# Patient Record
Sex: MALE | Race: WHITE | Employment: FULL TIME | URBAN - METROPOLITAN AREA
[De-identification: names, ages, dates, MRNs, and addresses within clinical notes are randomized per-mention and may not be internally consistent; named-entity substitution may affect disease eponyms.]

---

## 2017-12-26 ENCOUNTER — HOSPITAL ENCOUNTER (OUTPATIENT)
Dept: LAB | Age: 42
Discharge: HOME | End: 2017-12-26
Payer: OTHER GOVERNMENT

## 2017-12-26 ENCOUNTER — OFFICE VISIT (OUTPATIENT)
Dept: FAMILY MEDICINE CLINIC | Age: 42
End: 2017-12-26

## 2017-12-26 VITALS
WEIGHT: 303.8 LBS | TEMPERATURE: 97.8 F | HEIGHT: 74 IN | DIASTOLIC BLOOD PRESSURE: 87 MMHG | RESPIRATION RATE: 12 BRPM | BODY MASS INDEX: 38.99 KG/M2 | OXYGEN SATURATION: 98 % | SYSTOLIC BLOOD PRESSURE: 130 MMHG | HEART RATE: 80 BPM

## 2017-12-26 DIAGNOSIS — R10.11 RIGHT UPPER QUADRANT ABDOMINAL PAIN: ICD-10-CM

## 2017-12-26 DIAGNOSIS — E66.09 CLASS 2 OBESITY DUE TO EXCESS CALORIES WITHOUT SERIOUS COMORBIDITY WITH BODY MASS INDEX (BMI) OF 39.0 TO 39.9 IN ADULT: ICD-10-CM

## 2017-12-26 DIAGNOSIS — R10.11 RIGHT UPPER QUADRANT ABDOMINAL PAIN: Primary | ICD-10-CM

## 2017-12-26 PROBLEM — F33.9 RECURRENT DEPRESSION (HCC): Status: ACTIVE | Noted: 2017-12-26

## 2017-12-26 LAB
ALBUMIN SERPL-MCNC: 3.6 G/DL (ref 3.4–5)
ALBUMIN/GLOB SERPL: 1.2 {RATIO} (ref 0.8–1.7)
ALP SERPL-CCNC: 102 U/L (ref 45–117)
ALT SERPL-CCNC: 24 U/L (ref 16–61)
AMYLASE SERPL-CCNC: 26 U/L (ref 25–115)
ANION GAP SERPL CALC-SCNC: 9 MMOL/L (ref 3–18)
AST SERPL-CCNC: 15 U/L (ref 15–37)
BASOPHILS # BLD: 0.1 K/UL (ref 0–0.06)
BASOPHILS NFR BLD: 1 % (ref 0–2)
BILIRUB SERPL-MCNC: 0.3 MG/DL (ref 0.2–1)
BUN SERPL-MCNC: 13 MG/DL (ref 7–18)
BUN/CREAT SERPL: 12 (ref 12–20)
CALCIUM SERPL-MCNC: 8.5 MG/DL (ref 8.5–10.1)
CHLORIDE SERPL-SCNC: 104 MMOL/L (ref 100–108)
CO2 SERPL-SCNC: 26 MMOL/L (ref 21–32)
CREAT SERPL-MCNC: 1.11 MG/DL (ref 0.6–1.3)
DIFFERENTIAL METHOD BLD: ABNORMAL
EOSINOPHIL # BLD: 0.2 K/UL (ref 0–0.4)
EOSINOPHIL NFR BLD: 3 % (ref 0–5)
ERYTHROCYTE [DISTWIDTH] IN BLOOD BY AUTOMATED COUNT: 13.3 % (ref 11.6–14.5)
GLOBULIN SER CALC-MCNC: 3.1 G/DL (ref 2–4)
GLUCOSE SERPL-MCNC: 204 MG/DL (ref 74–99)
HCT VFR BLD AUTO: 49.6 % (ref 36–48)
HGB BLD-MCNC: 16.1 G/DL (ref 13–16)
LIPASE SERPL-CCNC: 109 U/L (ref 73–393)
LYMPHOCYTES # BLD: 2 K/UL (ref 0.9–3.6)
LYMPHOCYTES NFR BLD: 23 % (ref 21–52)
MCH RBC QN AUTO: 28.9 PG (ref 24–34)
MCHC RBC AUTO-ENTMCNC: 32.5 G/DL (ref 31–37)
MCV RBC AUTO: 89 FL (ref 74–97)
MONOCYTES # BLD: 0.6 K/UL (ref 0.05–1.2)
MONOCYTES NFR BLD: 7 % (ref 3–10)
NEUTS SEG # BLD: 5.6 K/UL (ref 1.8–8)
NEUTS SEG NFR BLD: 66 % (ref 40–73)
PLATELET # BLD AUTO: 181 K/UL (ref 135–420)
PMV BLD AUTO: 12.6 FL (ref 9.2–11.8)
POTASSIUM SERPL-SCNC: 4.4 MMOL/L (ref 3.5–5.5)
PROT SERPL-MCNC: 6.7 G/DL (ref 6.4–8.2)
RBC # BLD AUTO: 5.57 M/UL (ref 4.7–5.5)
SODIUM SERPL-SCNC: 139 MMOL/L (ref 136–145)
WBC # BLD AUTO: 8.5 K/UL (ref 4.6–13.2)

## 2017-12-26 PROCEDURE — 85025 COMPLETE CBC W/AUTO DIFF WBC: CPT | Performed by: FAMILY MEDICINE

## 2017-12-26 PROCEDURE — 83690 ASSAY OF LIPASE: CPT | Performed by: FAMILY MEDICINE

## 2017-12-26 PROCEDURE — 80053 COMPREHEN METABOLIC PANEL: CPT | Performed by: FAMILY MEDICINE

## 2017-12-26 PROCEDURE — 82150 ASSAY OF AMYLASE: CPT | Performed by: FAMILY MEDICINE

## 2017-12-26 PROCEDURE — 86677 HELICOBACTER PYLORI ANTIBODY: CPT | Performed by: FAMILY MEDICINE

## 2017-12-26 RX ORDER — HYDROCODONE BITARTRATE AND ACETAMINOPHEN 5; 325 MG/1; MG/1
TABLET ORAL
Refills: 0 | COMMUNITY
Start: 2017-12-21 | End: 2018-03-13 | Stop reason: ALTCHOICE

## 2017-12-26 RX ORDER — ONDANSETRON 4 MG/1
TABLET, ORALLY DISINTEGRATING ORAL
Refills: 0 | COMMUNITY
Start: 2017-12-21 | End: 2018-03-13 | Stop reason: ALTCHOICE

## 2017-12-26 NOTE — PROGRESS NOTES
Tiffanie Jaiem is a 38 y.o. male  presents for abdo pain. He went to ER in [place]. He was diagnosed with gall bladder disease. No Known Allergies  Outpatient Prescriptions Marked as Taking for the 12/26/17 encounter (Office Visit) with Martin Farley MD   Medication Sig Dispense Refill    HYDROcodone-acetaminophen (NORCO) 5-325 mg per tablet   0    ondansetron (ZOFRAN ODT) 4 mg disintegrating tablet   0    IBUPROFEN (MOTRIN PO) Take  by mouth.  diclofenac EC (VOLTAREN) 50 mg EC tablet TAKE 1 TABLET BY MOUTH 2 TIMES A DAY. 40 Tab 1    Cholecalciferol, Vitamin D3, 50,000 unit cap Take 50,000 Units by mouth every seven (7) days. Indications: VITAMIN D DEFICIENCY 8 Cap 0     Patient Active Problem List   Diagnosis Code    Heartburn R12    Chest pain, central R07.9    Neck pain M54.2    Seasonal allergic rhinitis J30.2    Smoking F17.200    Depression F32.9    Vitamin D deficiency E55.9    Obesity BMI 36.3 E66.9    Decreased libido R68.82     Past Medical History:   Diagnosis Date    Depression      Social History     Social History    Marital status:      Spouse name: N/A    Number of children: N/A    Years of education: N/A     Social History Main Topics    Smoking status: Current Every Day Smoker     Packs/day: 1.00     Years: 15.00     Types: Cigarettes    Smokeless tobacco: Former User    Alcohol use Yes      Comment: socail    Drug use: Not on file    Sexual activity: Not on file     Other Topics Concern    Not on file     Social History Narrative     No family history on file. Review of Systems   Constitutional: Negative for chills and fever. Cardiovascular: Negative for chest pain and palpitations. Gastrointestinal: Positive for abdominal pain, nausea and vomiting. Negative for constipation and diarrhea.      Vitals:    12/26/17 0903   BP: 130/87   Pulse: 80   Resp: 12   Temp: 97.8 °F (36.6 °C)   TempSrc: Oral   SpO2: 98%   Weight: 303 lb 12.8 oz (137.8 kg) Height: 6' 2\" (1.88 m)   PainSc:   4   PainLoc: Abdomen       Physical Exam   Constitutional: He is well-developed, well-nourished, and in no distress. Neck: Normal range of motion. Neck supple. Cardiovascular: Normal rate, regular rhythm and normal heart sounds. Pulmonary/Chest: Effort normal and breath sounds normal.   Musculoskeletal: Normal range of motion. Nursing note and vitals reviewed. Assessment/Plan      ICD-10-CM ICD-9-CM    1. Right upper quadrant abdominal pain R10.11 789.01 [facility]      REFERRAL TO GASTROENTEROLOGY      METABOLIC PANEL, COMPREHENSIVE      AMYLASE      LIPASE      H PYLORI AB, IGM      CBC WITH AUTOMATED DIFF     I have discussed the diagnosis with the patient and the intended plan of care as seen in the above orders. The patient has received an after-visit summary and questions were answered concerning future plans. I have discussed medication, side effects, and warnings with the patient in detail. The patient verbalized understanding and is in agreement with the plan of care. The patient will contact the office with any additional concerns. Follow-up Disposition:  Return in about 2 weeks (around 1/9/2018). lab results and schedule of future lab studies reviewed with patient      Discussed the patient's BMI with him. The BMI follow up plan is as follows:     dietary management education, guidance, and counseling  encourage exercise  monitor weight  prescribed dietary intake    An After Visit Summary was printed and given to the patient.     Sherry Vega MD

## 2017-12-26 NOTE — PATIENT INSTRUCTIONS
Abdominal Pain: Care Instructions  Your Care Instructions    Abdominal pain has many possible causes. Some aren't serious and get better on their own in a few days. Others need more testing and treatment. If your pain continues or gets worse, you need to be rechecked and may need more tests to find out what is wrong. You may need surgery to correct the problem. Don't ignore new symptoms, such as fever, nausea and vomiting, urination problems, pain that gets worse, and dizziness. These may be signs of a more serious problem. Your doctor may have recommended a follow-up visit in the next 8 to 12 hours. If you are not getting better, you may need more tests or treatment. The doctor has checked you carefully, but problems can develop later. If you notice any problems or new symptoms, get medical treatment right away. Follow-up care is a key part of your treatment and safety. Be sure to make and go to all appointments, and call your doctor if you are having problems. It's also a good idea to know your test results and keep a list of the medicines you take. How can you care for yourself at home? · Rest until you feel better. · To prevent dehydration, drink plenty of fluids, enough so that your urine is light yellow or clear like water. Choose water and other caffeine-free clear liquids until you feel better. If you have kidney, heart, or liver disease and have to limit fluids, talk with your doctor before you increase the amount of fluids you drink. · If your stomach is upset, eat mild foods, such as rice, dry toast or crackers, bananas, and applesauce. Try eating several small meals instead of two or three large ones. · Wait until 48 hours after all symptoms have gone away before you have spicy foods, alcohol, and drinks that contain caffeine. · Do not eat foods that are high in fat. · Avoid anti-inflammatory medicines such as aspirin, ibuprofen (Advil, Motrin), and naproxen (Aleve).  These can cause stomach upset. Talk to your doctor if you take daily aspirin for another health problem. When should you call for help? Call 911 anytime you think you may need emergency care. For example, call if:  ? · You passed out (lost consciousness). ? · You pass maroon or very bloody stools. ? · You vomit blood or what looks like coffee grounds. ? · You have new, severe belly pain. ?Call your doctor now or seek immediate medical care if:  ? · Your pain gets worse, especially if it becomes focused in one area of your belly. ? · You have a new or higher fever. ? · Your stools are black and look like tar, or they have streaks of blood. ? · You have unexpected vaginal bleeding. ? · You have symptoms of a urinary tract infection. These may include:  ¨ Pain when you urinate. ¨ Urinating more often than usual.  ¨ Blood in your urine. ? · You are dizzy or lightheaded, or you feel like you may faint. ? Watch closely for changes in your health, and be sure to contact your doctor if:  ? · You are not getting better after 1 day (24 hours). Where can you learn more? Go to http://delonte-antonio.info/. Enter B167 in the search box to learn more about \"Abdominal Pain: Care Instructions. \"  Current as of: March 20, 2017  Content Version: 11.4  © 1891-6737 [employer] [place]. Care instructions adapted under license by [employer] (which disclaims liability or warranty for this information). If you have questions about a medical condition or this instruction, always ask your healthcare professional. [place] [address] any warranty or liability for your use of this information. Body Mass Index: Care Instructions  Your Care Instructions    Body mass index (BMI) can help you see if your weight is raising your risk for health problems. It uses a formula to compare how much you weigh with how tall you are. · A BMI lower than 18.5 is considered underweight.   · A BMI between 18.5 and 24.9 is considered healthy. · A BMI between 25 and 29.9 is considered overweight. A BMI of 30 or higher is considered obese. If your BMI is in the normal range, it means that you have a lower risk for weight-related health problems. If your BMI is in the overweight or obese range, you may be at increased risk for weight-related health problems, such as high blood pressure, heart disease, stroke, arthritis or joint pain, and diabetes. If your BMI is in the underweight range, you may be at increased risk for health problems such as fatigue, lower protection (immunity) against illness, muscle loss, bone loss, hair loss, and hormone problems. BMI is just one measure of your risk for weight-related health problems. You may be at higher risk for health problems if you are not active, you eat an unhealthy diet, or you drink too much alcohol or use tobacco products. Follow-up care is a key part of your treatment and safety. Be sure to make and go to all appointments, and call your doctor if you are having problems. It's also a good idea to know your test results and keep a list of the medicines you take. How can you care for yourself at home? · Practice healthy eating habits. This includes eating plenty of fruits, vegetables, whole grains, lean protein, and low-fat dairy. · If your doctor recommends it, get more exercise. Walking is a good choice. Bit by bit, increase the amount you walk every day. Try for at least 30 minutes on most days of the week. · Do not smoke. Smoking can increase your risk for health problems. If you need help quitting, talk to your doctor about stop-smoking programs and medicines. These can increase your chances of quitting for good. · Limit alcohol to 2 drinks a day for men and 1 drink a day for women. Too much alcohol can cause health problems. If you have a BMI higher than 25  · Your doctor may do other tests to check your risk for weight-related health problems.  This may include measuring the distance around your waist. A waist measurement of more than 40 inches in men or 35 inches in women can increase the risk of weight-related health problems. · Talk with your doctor about steps you can take to stay healthy or improve your health. You may need to make lifestyle changes to lose weight and stay healthy, such as changing your diet and getting regular exercise. If you have a BMI lower than 18.5  · Your doctor may do other tests to check your risk for health problems. · Talk with your doctor about steps you can take to stay healthy or improve your health. You may need to make lifestyle changes to gain or maintain weight and stay healthy, such as getting more healthy foods in your diet and doing exercises to build muscle. Where can you learn more? Go to http://delonte-antonio.info/. Enter S176 in the search box to learn more about \"Body Mass Index: Care Instructions. \"  Current as of: October 13, 2016  Content Version: 11.4  © 2042-9564 [employer], [employer]. Care instructions adapted under license by [employer] (which disclaims liability or warranty for this information). If you have questions about a medical condition or this instruction, always ask your healthcare professional. [address] any warranty or liability for your use of this information.

## 2017-12-26 NOTE — MR AVS SNAPSHOT
Visit Information Date & Time Provider Department Dept. Phone Encounter #  
 12/26/2017  9:00 AM Martin Farley, [address] [facility] [id number] Follow-up Instructions Return in about 2 weeks (around 1/9/2018). Upcoming Health Maintenance Date Due Pneumococcal 19-64 Medium Risk (1 of 1 - PPSV23) 3/30/1992 Influenza Age 5 to Adult 8/1/2017 DTaP/Tdap/Td series (2 - Td) 12/8/2026 Allergies as of 12/26/2017  Review Complete On: 12/26/2017 By: Martin Farley MD  
 No Known Allergies Current Immunizations  Never Reviewed Name Date Tdap 12/8/2016 Not reviewed this visit You Were Diagnosed With   
  
 Codes Comments Right upper quadrant abdominal pain    -  Primary ICD-10-CM: R10.11 ICD-9-CM: 789.01 Vitals BP Pulse Temp Resp Height(growth percentile) Weight(growth percentile) 130/87 (BP 1 Location: Right arm, BP Patient Position: Sitting) 80 97.8 °F (36.6 °C) (Oral) 12 6' 2\" (1.88 m) 303 lb 12.8 oz (137.8 kg) SpO2 BMI Smoking Status 98% 39.01 kg/m2 Current Every Day Smoker BMI and BSA Data Body Mass Index Body Surface Area 39.01 kg/m 2 2.68 m 2 Preferred Pharmacy Pharmacy Name Phone [id number], [address] [place] [address] Your Updated Medication List  
  
   
This list is accurate as of: 12/26/17  9:19 AM.  Always use your most recent med list.  
  
  
  
  
 Cholecalciferol (Vitamin D3) 50,000 unit Cap Take 50,000 Units by mouth every seven (7) days. Indications: VITAMIN D DEFICIENCY  
  
 diclofenac EC 50 mg EC tablet Commonly known as:  VOLTAREN  
TAKE 1 TABLET BY MOUTH 2 TIMES A DAY. HYDROcodone-acetaminophen 5-325 mg per tablet Commonly known as:  Alisson President SUJATA PO Take  by mouth. ondansetron 4 mg disintegrating tablet Commonly known as:  ZOFRAN ODT We Performed the Following REFERRAL TO GASTROENTEROLOGY [QGA76 Custom] Comments:  
 Please evaluate patient for right upper quadrant pain. Follow-up Instructions Return in about 2 weeks (around 1/9/2018). To-Do List   
 12/26/2017 Lab:  AMYLASE   
  
 12/26/2017 Lab:  CBC WITH AUTOMATED DIFF   
  
 12/26/2017 Lab:  H PYLORI AB, IGM   
  
 12/26/2017 Lab:  LIPASE   
  
 12/26/2017 Lab:  METABOLIC PANEL, COMPREHENSIVE   
  
 12/26/2017 Imaging:  [facility] Referral Information Referral ID Referred By Referred To  
  
 [id number] Griselda Campos, 12[address] [facility] Wicho Lewis [facility] [place] [address] Drive [address] Henrik tafoya, [address] Phone: [phone] Fax: [phone] Visits Status Start Date End Date 1 New Request 12/26/17 12/26/18 If your referral has a status of pending review or denied, additional information will be sent to support the outcome of this decision. Patient Instructions Abdominal Pain: Care Instructions Your Care Instructions Abdominal pain has many possible causes. Some aren't serious and get better on their own in a few days. Others need more testing and treatment. If your pain continues or gets worse, you need to be rechecked and may need more tests to find out what is wrong. You may need surgery to correct the problem. Don't ignore new symptoms, such as fever, nausea and vomiting, urination problems, pain that gets worse, and dizziness. These may be signs of a more serious problem. Your doctor may have recommended a follow-up visit in the next 8 to 12 hours. If you are not getting better, you may need more tests or treatment. The doctor has checked you carefully, but problems can develop later. If you notice any problems or new symptoms, get medical treatment right away. Follow-up care is a key part of your treatment and safety.  Be sure to make and go to all appointments, and call your doctor if you are having problems. It's also a good idea to know your test results and keep a list of the medicines you take. How can you care for yourself at home? · Rest until you feel better. · To prevent dehydration, drink plenty of fluids, enough so that your urine is light yellow or clear like water. Choose water and other caffeine-free clear liquids until you feel better. If you have kidney, heart, or liver disease and have to limit fluids, talk with your doctor before you increase the amount of fluids you drink. · If your stomach is upset, eat mild foods, such as rice, dry toast or crackers, bananas, and applesauce. Try eating several small meals instead of two or three large ones. · Wait until 48 hours after all symptoms have gone away before you have spicy foods, alcohol, and drinks that contain caffeine. · Do not eat foods that are high in fat. · Avoid anti-inflammatory medicines such as aspirin, ibuprofen (Advil, Motrin), and naproxen (Aleve). These can cause stomach upset. Talk to your doctor if you take daily aspirin for another health problem. When should you call for help? Call 911 anytime you think you may need emergency care. For example, call if: 
? · You passed out (lost consciousness). ? · You pass maroon or very bloody stools. ? · You vomit blood or what looks like coffee grounds. ? · You have new, severe belly pain. ?Call your doctor now or seek immediate medical care if: 
? · Your pain gets worse, especially if it becomes focused in one area of your belly. ? · You have a new or higher fever. ? · Your stools are black and look like tar, or they have streaks of blood. ? · You have unexpected vaginal bleeding. ? · You have symptoms of a urinary tract infection. These may include: 
¨ Pain when you urinate. ¨ Urinating more often than usual. 
¨ Blood in your urine. ? · You are dizzy or lightheaded, or you feel like you may faint. ? Watch closely for changes in your health, and be sure to contact your doctor if: 
? · You are not getting better after 1 day (24 hours). Where can you learn more? Go to http://delonte-antonio.info/. Enter R674 in the search box to learn more about \"Abdominal Pain: Care Instructions. \" Current as of: March 20, 2017 Content Version: 11.4 © 5068-9615 [employer]. Care instructions adapted under license by [employer] (which disclaims liability or warranty for this information). If you have questions about a medical condition or this instruction, always ask your healthcare professional. [address] any warranty or liability for your use of this information. Introducing [facility]! Dear Cary Layton: 
Thank you for requesting a [email or web address] account. Our records indicate that you already have an active [email or web address] account. You can access your account anytime at [email or web address] [email or web address]. [employer]/So Protect Me Did you know that you can access your hospital and ER discharge instructions at any time in [email or web address]? You can also review all of your test results from your hospital stay or ER visit. Additional Information If you have questions, please visit the Frequently Asked Questions section of the [email or web address] website at [email or web address] [employer]/So Protect Me/. Remember, [email or web address] is NOT to be used for urgent needs. For medical emergencies, dial 911. Now available from your iPhone and Android! Please provide this summary of care documentation to your next provider. Your primary care clinician is listed as [id number] [facility]. If you have any questions after today's visit, please call [phone].

## 2017-12-26 NOTE — PROGRESS NOTES
Chief Complaint   Patient presents with    Abdominal Pain     1. Have you been to the ER, urgent care clinic since your last visit? Hospitalized since your last visit? Yes When: 12/20/17 Where: [facility] Reason for visit: vomitting, abdominal pain    2. Have you seen or consulted any other health care providers outside of the [address] [place] [address] since your last visit? Include any pap smears or colon screening.  No

## 2017-12-27 LAB — H PYLORI IGM SER-ACNC: <9 UNITS (ref 0–8.9)

## 2017-12-29 ENCOUNTER — HOSPITAL ENCOUNTER (OUTPATIENT)
Dept: ULTRASOUND IMAGING | Age: 42
Discharge: HOME | End: 2017-12-29
Attending: FAMILY MEDICINE
Payer: OTHER GOVERNMENT

## 2017-12-29 DIAGNOSIS — R10.11 RIGHT UPPER QUADRANT ABDOMINAL PAIN: ICD-10-CM

## 2017-12-29 PROCEDURE — 76705 ECHO EXAM OF ABDOMEN: CPT

## 2018-01-08 ENCOUNTER — OFFICE VISIT (OUTPATIENT)
Dept: FAMILY MEDICINE CLINIC | Age: 43
End: 2018-01-08

## 2018-01-08 VITALS
SYSTOLIC BLOOD PRESSURE: 140 MMHG | RESPIRATION RATE: 15 BRPM | OXYGEN SATURATION: 98 % | BODY MASS INDEX: 38.89 KG/M2 | HEART RATE: 71 BPM | TEMPERATURE: 98.2 F | HEIGHT: 74 IN | DIASTOLIC BLOOD PRESSURE: 70 MMHG | WEIGHT: 303 LBS

## 2018-01-08 DIAGNOSIS — E66.09 CLASS 2 OBESITY DUE TO EXCESS CALORIES WITHOUT SERIOUS COMORBIDITY WITH BODY MASS INDEX (BMI) OF 38.0 TO 38.9 IN ADULT: ICD-10-CM

## 2018-01-08 DIAGNOSIS — R73.9 ELEVATED BLOOD SUGAR: ICD-10-CM

## 2018-01-08 DIAGNOSIS — R10.9 ABDOMINAL PAIN, UNSPECIFIED ABDOMINAL LOCATION: Primary | ICD-10-CM

## 2018-01-08 RX ORDER — OMEPRAZOLE 20 MG/1
CAPSULE, DELAYED RELEASE ORAL
Qty: 30 CAP | Refills: 2 | Status: SHIPPED | OUTPATIENT
Start: 2018-01-08 | End: 2018-03-13 | Stop reason: SDUPTHER

## 2018-01-08 RX ORDER — METFORMIN HYDROCHLORIDE 500 MG/1
500 TABLET ORAL
Qty: 30 TAB | Refills: 1 | Status: SHIPPED | OUTPATIENT
Start: 2018-01-08 | End: 2018-05-29 | Stop reason: SDUPTHER

## 2018-01-08 NOTE — MR AVS SNAPSHOT
Visit Information Date & Time Provider Department Dept. Phone Encounter #  
 1/8/2018  4:30 PM Rosa Maria Lester MD [facility] [phone] [id number] Follow-up Instructions Return in about 1 month (around 2/8/2018). Upcoming Health Maintenance Date Due DTaP/Tdap/Td series (2 - Td) 12/8/2026 Allergies as of 1/8/2018  Review Complete On: 1/8/2018 By: Rosa Maria Lester MD  
 No Known Allergies Current Immunizations  Never Reviewed Name Date Tdap 12/8/2016 Not reviewed this visit You Were Diagnosed With   
  
 Codes Comments Abdominal pain, unspecified abdominal location    -  Primary ICD-10-CM: R10.9 ICD-9-CM: 789.00 Elevated blood sugar     ICD-10-CM: R73.9 ICD-9-CM: 790.29 Class 2 obesity due to excess calories without serious comorbidity with body mass index (BMI) of 38.0 to 38.9 in adult     ICD-10-CM: E66.09, Z68.38 
ICD-9-CM: 278.00, V85.38 Vitals BP Pulse Temp Resp Height(growth percentile) Weight(growth percentile) 140/70 71 98.2 °F (36.8 °C) 15 6' 2\" (1.88 m) 303 lb (137.4 kg) SpO2 BMI Smoking Status 98% 38.9 kg/m2 Current Every Day Smoker BMI and BSA Data Body Mass Index Body Surface Area 38.9 kg/m 2 2.68 m 2 Preferred Pharmacy Pharmacy Name Phone [facility] [address] [place] [address] Rd [phone] Your Updated Medication List  
  
   
This list is accurate as of: 1/8/18  5:04 PM.  Always use your most recent med list.  
  
  
  
  
 Cholecalciferol (Vitamin D3) 50,000 unit Cap Take 50,000 Units by mouth every seven (7) days. Indications: VITAMIN D DEFICIENCY  
  
 diclofenac EC 50 mg EC tablet Commonly known as:  VOLTAREN  
TAKE 1 TABLET BY MOUTH 2 TIMES A DAY. HYDROcodone-acetaminophen 5-325 mg per tablet Commonly known as:  NORCO  
  
 metFORMIN 500 mg tablet Commonly known as:  GLUCOPHAGE  
 Take 1 Tab by mouth daily (with breakfast). MOTRIN PO Take  by mouth. omeprazole 20 mg capsule Commonly known as:  PRILOSEC  
TAKE ONE CAPSULE BY MOUTH DAILY  DAYS  
  
 ondansetron 4 mg disintegrating tablet Commonly known as:  ZOFRAN ODT Prescriptions Sent to Pharmacy Refills  
 metFORMIN (GLUCOPHAGE) 500 mg tablet 1 Sig: Take 1 Tab by mouth daily (with breakfast). Class: Normal  
 Pharmacy: [address] [place] [address], [address] [place] [address] Ph #: [phone] Route: Oral  
 omeprazole (PRILOSEC) 20 mg capsule 2 Sig: TAKE ONE CAPSULE BY MOUTH DAILY  DAYS Class: Normal  
 Pharmacy: [address] [place] [address], [address] [place] [address] Ph #: [phone] We Performed the Following AMB POC HEMOGLOBIN A1C [79929 CPT(R)] Follow-up Instructions Return in about 1 month (around 2/8/2018). Patient Instructions Learning About Tests When You Have Diabetes Why do you need regular diabetes tests? Diabetes can be hard on your body if it's not well controlled. But having tests on a regular schedule can help you and your doctor find problems early, when it's easier to start managing them. What tests do you need? The tests you may have, how often you should have them, and the goals of the tests are: 
A1c blood test. This test shows the average level of blood sugar over the past 2 to 3 months. It helps your doctor see whether blood sugar levels have been staying within your target range. · How often: Every 3 to 6 months · Goal: A blood sugar level in your target range Blood pressure test: This test measures the pressure of blood flow in the arteries. Controlling blood pressure can help prevent damage to nerves and blood vessels. · How often: Every 3 to 6 months · Goal: A blood pressure level in your target range Cholesterol test: This test measures the amount of a type of fat in the blood. It is common for people with diabetes to also have high cholesterol. Too much cholesterol in the blood can build up inside the blood vessels and raise the risk for heart attack and stroke. · How often: At the time of your diabetes diagnosis, and as often as your doctor recommends after that · Goal: A cholesterol level in your target range Albumin-creatinine ratio test: This test checks for kidney damage by looking for the protein albumin (say \"al-BYOO-ramses\") in the urine. Albumin is normally found in the blood. Kidney damage can let small amounts of it (microalbumin) leak into the urine. · How often: Once a year · Goal: No protein in the urine Blood creatinine test/estimated glomerular filtration (eGFR): The blood creatinine (say \"smqj-FX-vi-neen\") level shows how well your kidneys are working. Creatinine is a waste product that muscles release into the blood. Blood creatinine is used to estimate the glomerular filtration rate. A high level of creatinine and/or a low eGFR may mean your kidneys are not working as well as they should. · How often: Once a year · Goal: Normal level of creatinine in the blood. The eGFR goal is greater than 60 mL/min/1.73 m². Complete foot exam: The doctor checks for foot sores and whether any sensation has been lost. 
· How often: Once a year · Goal: Healthy feet with no foot ulcers or loss of feeling Dental exam and cleaning: The dentist checks for gum disease and tooth decay. People with high blood sugar are more likely to have these problems. · How often: Every 6 months · Goal: Healthy teeth and gums Complete eye exam: High blood sugar levels can damage the eyes. This exam is done by an ophthalmologist or optometrist. It includes a dilated eye exam. The exam shows whether there's damage to the back of the eye (diabetic retinopathy). · How often: Once a year. If you don't have any signs of diabetic retinopathy, your doctor may recommend an exam every 2 years. · Goal: No damage to the back of the eye Thyroid-stimulating hormone (TSH) blood test: This test checks for thyroid disease. Too little thyroid hormone can cause some medicines (like insulin) to stay in the body longer. This can cause low blood sugar. You may be tested if you have high cholesterol or are a woman over 48years old. · How often: As part of your diabetes diagnosis, and as often as your doctor recommends after that · Goal: Normal level of TSH in the blood Follow-up care is a key part of your treatment and safety. Be sure to make and go to all appointments, and call your doctor if you are having problems. It's also a good idea to know your test results and keep a list of the medicines you take. Where can you learn more? Go to http://delonte-antonio.info/. Enter [id number] in the search box to learn more about \"Learning About Tests When You Have Diabetes. \" Current as of: March 13, 2017 Content Version: 11.4 © 7147-7013 [employer]. Care instructions adapted under license by [employer] (which disclaims liability or warranty for this information). If you have questions about a medical condition or this instruction, always ask your healthcare professional. [address] any warranty or liability for your use of this information. Introducing [facility]! Dear Skip Perla: 
Thank you for requesting a [employer] account. Our records indicate that you already have an active [employer] account. You can access your account anytime at [email or web address] Did you know that you can access your hospital and ER discharge instructions at any time in [employer]? You can also review all of your test results from your hospital stay or ER visit. Additional Information If you have questions, please visit the Frequently Asked Questions section of the [employer] website at [email or web address] Remember, [employer] is NOT to be used for urgent needs. For medical emergencies, dial 911. Now available from your iPhone and Android! Please provide this summary of care documentation to your next provider. Your primary care clinician is listed as [id number] [facility]. If you have any questions after today's visit, please call [phone].

## 2018-01-08 NOTE — PROGRESS NOTES
Chief Complaint   Patient presents with    Abdominal Pain     f/u    Nausea     still comes and goes      1. Have you been to the ER, urgent care clinic since your last visit? Hospitalized since your last visit? No    2. Have you seen or consulted any other health care providers outside of the [address] [place] [address] since your last visit? Include any pap smears or colon screening. No     Pt reports he vomited this morning.

## 2018-01-08 NOTE — PROGRESS NOTES
Jorge Luis Salazar is a 38 y.o. male  presents for follow up. He has no new complaints. Labs reviewed. No Known Allergies  Outpatient Prescriptions Marked as Taking for the 1/8/18 encounter (Office Visit) with Aung Solares MD   Medication Sig Dispense Refill    ondansetron (ZOFRAN ODT) 4 mg disintegrating tablet   0    IBUPROFEN (MOTRIN PO) Take  by mouth.  diclofenac EC (VOLTAREN) 50 mg EC tablet TAKE 1 TABLET BY MOUTH 2 TIMES A DAY. 40 Tab 1    Cholecalciferol, Vitamin D3, 50,000 unit cap Take 50,000 Units by mouth every seven (7) days. Indications: VITAMIN D DEFICIENCY 8 Cap 0     Patient Active Problem List   Diagnosis Code    Heartburn R12    Chest pain, central R07.9    Neck pain M54.2    Seasonal allergic rhinitis J30.2    Smoking F17.200    Depression F32.9    Vitamin D deficiency E55.9    Obesity E66.9    Decreased libido R68.82    Recurrent depression ([address] F33.9     Past Medical History:   Diagnosis Date    Depression      Social History     Social History    Marital status:      Spouse name: N/A    Number of children: N/A    Years of education: N/A     Social History Main Topics    Smoking status: Current Every Day Smoker     Packs/day: 1.00     Years: 15.00     Types: Cigarettes    Smokeless tobacco: Former User    Alcohol use Yes      Comment: socail    Drug use: None    Sexual activity: Not Asked     Other Topics Concern    None     Social History Narrative     No family history on file. Review of Systems   Constitutional: Negative for chills and fever. Cardiovascular: Negative for chest pain and palpitations. Gastrointestinal: Positive for abdominal pain. Negative for constipation, diarrhea, nausea and vomiting.      Vitals:    01/08/18 1628   BP: 140/70   Pulse: 71   Resp: 15   Temp: 98.2 °F (36.8 °C)   SpO2: 98%   Weight: 303 lb (137.4 kg)   Height: 6' 2\" (1.88 m)   PainSc:   0 - No pain       Physical Exam   Constitutional: He is oriented to person, place, and time and well-developed, well-nourished, and in no distress. Cardiovascular: Normal rate, regular rhythm and normal heart sounds. Pulmonary/Chest: Effort normal and breath sounds normal.   Neurological: He is alert and oriented to person, place, and time. Skin: Skin is warm and dry. Nursing note and vitals reviewed. Assessment/Plan      ICD-10-CM ICD-9-CM    1. Abdominal pain, unspecified abdominal location R10.9 789.00 AMB POC HEMOGLOBIN A1C      omeprazole (PRILOSEC) 20 mg capsule   2. Elevated blood sugar R73.9 790.29 metFORMIN (GLUCOPHAGE) 500 mg tablet   3. Class 2 obesity due to excess calories without serious comorbidity with body mass index (BMI) of 38.0 to 38.9 in adult E66.09 278.00     Z68.38 V85.38      I have discussed the diagnosis with the patient and the intended plan of care as seen in the above orders. The patient has received an after-visit summary and questions were answered concerning future plans. I have discussed medication, side effects, and warnings with the patient in detail. The patient verbalized understanding and is in agreement with the plan of care. The patient will contact the office with any additional concerns. Follow-up Disposition:  Return in about 1 month (around 2/8/2018).   lab results and schedule of future lab studies reviewed with patient      Margarito Navas MD

## 2018-01-08 NOTE — PATIENT INSTRUCTIONS
Learning About Tests When You Have Diabetes  Why do you need regular diabetes tests? Diabetes can be hard on your body if it's not well controlled. But having tests on a regular schedule can help you and your doctor find problems early, when it's easier to start managing them. What tests do you need? The tests you may have, how often you should have them, and the goals of the tests are:  A1c blood test. This test shows the average level of blood sugar over the past 2 to 3 months. It helps your doctor see whether blood sugar levels have been staying within your target range. · How often: Every 3 to 6 months  · Goal: A blood sugar level in your target range  Blood pressure test: This test measures the pressure of blood flow in the arteries. Controlling blood pressure can help prevent damage to nerves and blood vessels. · How often: Every 3 to 6 months  · Goal: A blood pressure level in your target range  Cholesterol test: This test measures the amount of a type of fat in the blood. It is common for people with diabetes to also have high cholesterol. Too much cholesterol in the blood can build up inside the blood vessels and raise the risk for heart attack and stroke. · How often: At the time of your diabetes diagnosis, and as often as your doctor recommends after that  · Goal: A cholesterol level in your target range  Albumin-creatinine ratio test: This test checks for kidney damage by looking for the protein albumin (say \"al-BYOO-ramses\") in the urine. Albumin is normally found in the blood. Kidney damage can let small amounts of it (microalbumin) leak into the urine. · How often: Once a year  · Goal: No protein in the urine  Blood creatinine test/estimated glomerular filtration (eGFR): The blood creatinine (say \"kfun-ST-st-neen\") level shows how well your kidneys are working. Creatinine is a waste product that muscles release into the blood.  Blood creatinine is used to estimate the glomerular filtration rate. A high level of creatinine and/or a low eGFR may mean your kidneys are not working as well as they should. · How often: Once a year  · Goal: Normal level of creatinine in the blood. The eGFR goal is greater than 60 mL/min/1.73 m². Complete foot exam: The doctor checks for foot sores and whether any sensation has been lost.  · How often: Once a year  · Goal: Healthy feet with no foot ulcers or loss of feeling  Dental exam and cleaning: The dentist checks for gum disease and tooth decay. People with high blood sugar are more likely to have these problems. · How often: Every 6 months  · Goal: Healthy teeth and gums  Complete eye exam: High blood sugar levels can damage the eyes. This exam is done by an ophthalmologist or optometrist. It includes a dilated eye exam. The exam shows whether there's damage to the back of the eye (diabetic retinopathy). · How often: Once a year. If you don't have any signs of diabetic retinopathy, your doctor may recommend an exam every 2 years. · Goal: No damage to the back of the eye  Thyroid-stimulating hormone (TSH) blood test: This test checks for thyroid disease. Too little thyroid hormone can cause some medicines (like insulin) to stay in the body longer. This can cause low blood sugar. You may be tested if you have high cholesterol or are a woman over 48years old. · How often: As part of your diabetes diagnosis, and as often as your doctor recommends after that  · Goal: Normal level of TSH in the blood  Follow-up care is a key part of your treatment and safety. Be sure to make and go to all appointments, and call your doctor if you are having problems. It's also a good idea to know your test results and keep a list of the medicines you take. Where can you learn more? Go to http://delonte-antonio.info/. Enter [id number] in the search box to learn more about \"Learning About Tests When You Have Diabetes. \"  Current as of: March 13, 2017  Content Version: 11.4  © 8814-8610 Healthwise[employer]. Care instructions adapted under license by [employer] (which disclaims liability or warranty for this information). If you have questions about a medical condition or this instruction, always ask your healthcare professional. [address] any warranty or liability for your use of this information.

## 2018-01-11 ENCOUNTER — OFFICE VISIT (OUTPATIENT)
Dept: FAMILY MEDICINE CLINIC | Age: 43
End: 2018-01-11

## 2018-01-11 ENCOUNTER — HOSPITAL ENCOUNTER (OUTPATIENT)
Dept: LAB | Age: 43
Discharge: HOME | End: 2018-01-11
Payer: OTHER GOVERNMENT

## 2018-01-11 VITALS
HEART RATE: 100 BPM | SYSTOLIC BLOOD PRESSURE: 166 MMHG | TEMPERATURE: 98.1 F | RESPIRATION RATE: 15 BRPM | DIASTOLIC BLOOD PRESSURE: 98 MMHG | OXYGEN SATURATION: 97 % | HEIGHT: 74 IN | BODY MASS INDEX: 38.89 KG/M2 | WEIGHT: 303 LBS

## 2018-01-11 DIAGNOSIS — I10 HYPERTENSION, UNSPECIFIED TYPE: ICD-10-CM

## 2018-01-11 DIAGNOSIS — R07.89 CHEST WALL PAIN: ICD-10-CM

## 2018-01-11 DIAGNOSIS — R07.89 CHEST WALL PAIN: Primary | ICD-10-CM

## 2018-01-11 PROCEDURE — 86787 VARICELLA-ZOSTER ANTIBODY: CPT | Performed by: FAMILY MEDICINE

## 2018-01-11 RX ORDER — GABAPENTIN 300 MG/1
300 CAPSULE ORAL 3 TIMES DAILY
Qty: 60 CAP | Refills: 0 | Status: SHIPPED | OUTPATIENT
Start: 2018-01-11 | End: 2018-03-13 | Stop reason: SDUPTHER

## 2018-01-11 NOTE — PROGRESS NOTES
Leonarda Stephenson is a 38 y.o. male  presents for pain in right mid back. It radiates to right side of chest.  He thinks that it is worse with eating. No nausea or vomiting. No fever or chills. No Known Allergies  Outpatient Prescriptions Marked as Taking for the 1/11/18 encounter (Office Visit) with Ramirez Llamas MD   Medication Sig Dispense Refill    gabapentin (NEURONTIN) 300 mg capsule Take 1 Cap by mouth three (3) times daily. Indications: NEUROPATHIC PAIN 60 Cap 0    metFORMIN (GLUCOPHAGE) 500 mg tablet Take 1 Tab by mouth daily (with breakfast). 30 Tab 1    omeprazole (PRILOSEC) 20 mg capsule TAKE ONE CAPSULE BY MOUTH DAILY  DAYS 30 Cap 2    HYDROcodone-acetaminophen (NORCO) 5-325 mg per tablet   0    ondansetron (ZOFRAN ODT) 4 mg disintegrating tablet   0    IBUPROFEN (MOTRIN PO) Take  by mouth.  diclofenac EC (VOLTAREN) 50 mg EC tablet TAKE 1 TABLET BY MOUTH 2 TIMES A DAY. 40 Tab 1    Cholecalciferol, Vitamin D3, 50,000 unit cap Take 50,000 Units by mouth every seven (7) days.  Indications: VITAMIN D DEFICIENCY 8 Cap 0     Patient Active Problem List   Diagnosis Code    Heartburn R12    Chest pain, central R07.9    Neck pain M54.2    Seasonal allergic rhinitis J30.2    Smoking F17.200    Depression F32.9    Vitamin D deficiency E55.9    Obesity E66.9    Decreased libido R68.82    Recurrent depression ([facility] [address] F33.9    Class 2 obesity due to excess calories without serious comorbidity with body mass index (BMI) of 38.0 to 38.9 in adult E66.09, Z68.38     Past Medical History:   Diagnosis Date    Depression      Social History     Social History    Marital status:      Spouse name: N/A    Number of children: N/A    Years of education: N/A     Social History Main Topics    Smoking status: Current Every Day Smoker     Packs/day: 1.00     Years: 15.00     Types: Cigarettes    Smokeless tobacco: Former User    Alcohol use Yes      Comment: socail    Drug use: None    Sexual activity: Not Asked     Other Topics Concern    None     Social History Narrative     No family history on file. Review of Systems   Constitutional: Negative for chills and fever. Respiratory: Negative for cough and shortness of breath. Cardiovascular: Negative for chest pain and palpitations. Gastrointestinal: Negative for abdominal pain, constipation, diarrhea, nausea and vomiting. Genitourinary: Negative for dysuria, flank pain and urgency. Musculoskeletal: Positive for back pain. Psychiatric/Behavioral: Negative. Vitals:    01/11/18 0925   BP: (!) 166/98   Pulse: 100   Resp: 15   Temp: 98.1 °F (36.7 °C)   SpO2: 97%   Weight: 303 lb (137.4 kg)   Height: 6' 2\" (1.88 m)   PainSc:   7       Physical Exam   Constitutional: He is oriented to person, place, and time and well-developed, well-nourished, and in no distress. Cardiovascular: Normal rate, regular rhythm and normal heart sounds. Pulmonary/Chest: Effort normal and breath sounds normal.   Abdominal: Soft. Bowel sounds are normal. He exhibits no distension. There is no tenderness. Musculoskeletal: Normal range of motion. He exhibits tenderness (mid back on right). He exhibits no edema. Neurological: He is alert and oriented to person, place, and time. Skin: Skin is warm and dry. Psychiatric: Memory, affect and judgment normal.   Nursing note and vitals reviewed. Assessment/Plan      ICD-10-CM ICD-9-CM    1. Chest wall pain R07.89 786.52 VZV AB, IGM      gabapentin (NEURONTIN) 300 mg capsule   2. Hypertension, unspecified type I10 401.9      Ultrasound reviewed and labs sent. He has already seen GI. Will follow up blood pressure check. I have discussed the diagnosis with the patient and the intended plan of care as seen in the above orders. The patient has received an after-visit summary and questions were answered concerning future plans.  I have discussed medication, side effects, and warnings with the patient in detail. The patient verbalized understanding and is in agreement with the plan of care. The patient will contact the office with any additional concerns.       Follow-up Disposition:  Return if symptoms worsen or fail to improve.  lab results and schedule of future lab studies reviewed with patient    Margarito Navas MD

## 2018-01-11 NOTE — MR AVS SNAPSHOT
Visit Information Date & Time Provider Department Dept. Phone Encounter #  
 1/11/2018  9:30 AM Bin Armijo, [address] [id number] Follow-up Instructions Return if symptoms worsen or fail to improve. Your Appointments 2/7/2018  5:30 PM  
ROUTINE CARE with Bin Armijo MD  
[facility]) Appt Note: 1 month f/u  
 [address] [facility] [address] [facility]  
[phone]  
  
   
 [address] Upcoming Health Maintenance Date Due DTaP/Tdap/Td series (2 - Td) 12/8/2026 Allergies as of 1/11/2018  Review Complete On: 1/11/2018 By: Bin Armijo MD  
 No Known Allergies Current Immunizations  Never Reviewed Name Date Tdap 12/8/2016 Not reviewed this visit You Were Diagnosed With   
  
 Codes Comments Chest wall pain    -  Primary ICD-10-CM: R07.89 ICD-9-CM: 786.52 Vitals BP Pulse Temp Resp Height(growth percentile) Weight(growth percentile) (!) 166/98 100 98.1 °F (36.7 °C) 15 6' 2\" (1.88 m) 303 lb (137.4 kg) SpO2 BMI Smoking Status 97% 38.9 kg/m2 Current Every Day Smoker Vitals History BMI and BSA Data Body Mass Index Body Surface Area 38.9 kg/m 2 2.68 m 2 Preferred Pharmacy Pharmacy Name Phone [id number] [address] [place] [address] [phone] Your Updated Medication List  
  
   
This list is accurate as of: 1/11/18  9:46 AM.  Always use your most recent med list.  
  
  
  
  
 Cholecalciferol (Vitamin D3) 50,000 unit Cap Take 50,000 Units by mouth every seven (7) days. Indications: VITAMIN D DEFICIENCY  
  
 diclofenac EC 50 mg EC tablet Commonly known as:  VOLTAREN  
TAKE 1 TABLET BY MOUTH 2 TIMES A DAY. gabapentin 300 mg capsule Commonly known as:  NEURONTIN  
 Take 1 Cap by mouth three (3) times daily. Indications: NEUROPATHIC PAIN  
  
 HYDROcodone-acetaminophen 5-325 mg per tablet Commonly known as:  NORCO  
  
 metFORMIN 500 mg tablet Commonly known as:  GLUCOPHAGE Take 1 Tab by mouth daily (with breakfast). MOTRIN PO Take  by mouth. omeprazole 20 mg capsule Commonly known as:  PRILOSEC  
TAKE ONE CAPSULE BY MOUTH DAILY  DAYS  
  
 ondansetron 4 mg disintegrating tablet Commonly known as:  ZOFRAN ODT Prescriptions Sent to Pharmacy Refills  
 gabapentin (NEURONTIN) 300 mg capsule 0 Sig: Take 1 Cap by mouth three (3) times daily. Indications: NEUROPATHIC PAIN Class: Normal  
 Pharmacy: [address] [place] [address], [address] [place] [address] #: [phone] Route: Oral  
  
Follow-up Instructions Return if symptoms worsen or fail to improve. To-Do List   
 01/11/2018 Lab:  [facility], [facility] Patient Instructions Shingles: Care Instructions Your Care Instructions Shingles (herpes zoster) causes pain and a blistered rash. The rash can appear anywhere on the body but will be on only one side of the body, the left or right. It will be in a band, a strip, or a small area. The pain can be very severe. Shingles can also cause tingling or itching in the area of the rash. The blisters scab over after a few days and heal in 2 to 4 weeks. Medicines can help you feel better and may help prevent more serious problems caused by shingles. Shingles is caused by the same virus that causes chickenpox. When you have chickenpox, the virus gets into your nerve roots and stays there (becomes dormant) long after you get over the chickenpox. If the virus becomes active again, it can cause shingles. Follow-up care is a key part of your treatment and safety.  Be sure to make and go to all appointments, and call your doctor if you are having problems. It's also a good idea to know your test results and keep a list of the medicines you take. How can you care for yourself at home? · Be safe with medicines. Take your medicines exactly as prescribed. Call your doctor if you think you are having a problem with your medicine. Antiviral medicine helps you get better faster. · Try not to scratch or pick at the blisters. They will crust over and fall off on their own if you leave them alone. · Put cool, wet cloths on the area to relieve pain and itching. You can also use calamine lotion. Try not to use so much lotion that it cakes and is hard to get off. · Put cornstarch or baking soda on the sores to help dry them out so they heal faster. · Do not use thick ointment, such as petroleum jelly, on the sores. This will keep them from drying and healing. · To help remove loose crusts, soak them in tap water. This can help decrease oozing, and dry and soothe the skin. · Take an over-the-counter pain medicine, such as acetaminophen (Tylenol), ibuprofen (Advil, Motrin), or naproxen (Aleve). Read and follow all instructions on the label. · Avoid close contact with people until the blisters have healed. It is very important for you to avoid contact with anyone who has never had chickenpox or the chickenpox vaccine. Pregnant women, young babies, and anyone else who has a hard time fighting infection (such as someone with HIV, diabetes, or cancer) is especially at risk. When should you call for help? Call your doctor now or seek immediate medical care if: 
? · You have a new or higher fever. ? · You have a severe headache and a stiff neck. ? · You lose the ability to think clearly. ? · The rash spreads to your forehead, nose, eyes, or eyelids. ? · You have eye pain, or your vision gets worse. ? · You have new pain in your face, or you cannot move the muscles in your face. ? · Blisters spread to new parts of your body. ?Watch closely for changes in your health, and be sure to contact your doctor if: 
? · The rash has not healed after 2 to 4 weeks. ? · You still have pain after the rash has healed. Where can you learn more? Go to http://delonte-atnonio.info/. Bertin Varela in the search box to learn more about \"Shingles: Care Instructions. \" Current as of: March 3, 2017 Content Version: 11.4 © 8495-5458 [employer]. Care instructions adapted under license by Leaf (which disclaims liability or warranty for this information). If you have questions about a medical condition or this instruction, always ask your healthcare professional. [address] any warranty or liability for your use of this information. Introducing [facility]! Dear Gwendolyn Ingram: 
Thank you for requesting a BeanJockey account. Our records indicate that you already have an active BeanJockey account. You can access your account anytime at [email or web address] Did you know that you can access your hospital and ER discharge instructions at any time in BeanJockey? You can also review all of your test results from your hospital stay or ER visit. Additional Information If you have questions, please visit the Frequently Asked Questions section of the BeanJockey website at [email or web address] Remember, BeanJockey is NOT to be used for urgent needs. For medical emergencies, dial 911. Now available from your iPhone and Android! Please provide this summary of care documentation to your next provider. Your primary care clinician is listed as [id number] [facility]. If you have any questions after today's visit, please call [phone].

## 2018-01-11 NOTE — PATIENT INSTRUCTIONS
Shingles: Care Instructions  Your Care Instructions    Shingles (herpes zoster) causes pain and a blistered rash. The rash can appear anywhere on the body but will be on only one side of the body, the left or right. It will be in a band, a strip, or a small area. The pain can be very severe. Shingles can also cause tingling or itching in the area of the rash. The blisters scab over after a few days and heal in 2 to 4 weeks. Medicines can help you feel better and may help prevent more serious problems caused by shingles. Shingles is caused by the same virus that causes chickenpox. When you have chickenpox, the virus gets into your nerve roots and stays there (becomes dormant) long after you get over the chickenpox. If the virus becomes active again, it can cause shingles. Follow-up care is a key part of your treatment and safety. Be sure to make and go to all appointments, and call your doctor if you are having problems. It's also a good idea to know your test results and keep a list of the medicines you take. How can you care for yourself at home? · Be safe with medicines. Take your medicines exactly as prescribed. Call your doctor if you think you are having a problem with your medicine. Antiviral medicine helps you get better faster. · Try not to scratch or pick at the blisters. They will crust over and fall off on their own if you leave them alone. · Put cool, wet cloths on the area to relieve pain and itching. You can also use calamine lotion. Try not to use so much lotion that it cakes and is hard to get off. · Put cornstarch or baking soda on the sores to help dry them out so they heal faster. · Do not use thick ointment, such as petroleum jelly, on the sores. This will keep them from drying and healing. · To help remove loose crusts, soak them in tap water. This can help decrease oozing, and dry and soothe the skin.   · Take an over-the-counter pain medicine, such as acetaminophen (Tylenol), ibuprofen (Advil, Motrin), or naproxen (Aleve). Read and follow all instructions on the label. · Avoid close contact with people until the blisters have healed. It is very important for you to avoid contact with anyone who has never had chickenpox or the chickenpox vaccine. Pregnant women, young babies, and anyone else who has a hard time fighting infection (such as someone with HIV, diabetes, or cancer) is especially at risk. When should you call for help? Call your doctor now or seek immediate medical care if:  ? · You have a new or higher fever. ? · You have a severe headache and a stiff neck. ? · You lose the ability to think clearly. ? · The rash spreads to your forehead, nose, eyes, or eyelids. ? · You have eye pain, or your vision gets worse. ? · You have new pain in your face, or you cannot move the muscles in your face. ? · Blisters spread to new parts of your body. ? Watch closely for changes in your health, and be sure to contact your doctor if:  ? · The rash has not healed after 2 to 4 weeks. ? · You still have pain after the rash has healed. Where can you learn more? Go to http://delonte-antonio.info/. Emre Hoover in the search box to learn more about \"Shingles: Care Instructions. \"  Current as of: March 3, 2017  Content Version: 11.4  © 8416-2119 [employer]. Care instructions adapted under license by [employer] (which disclaims liability or warranty for this information). If you have questions about a medical condition or this instruction, always ask your healthcare professional. [address] any warranty or liability for your use of this information.

## 2018-01-11 NOTE — PROGRESS NOTES
Pt reports that the pain under the right shoulder is hurting again. It has been hurting for about 45 minutes.

## 2018-01-12 LAB — VZV IGM SER IA-ACNC: <0.91 INDEX (ref 0–0.9)

## 2018-03-13 ENCOUNTER — OFFICE VISIT (OUTPATIENT)
Dept: FAMILY MEDICINE CLINIC | Age: 43
End: 2018-03-13

## 2018-03-13 VITALS
SYSTOLIC BLOOD PRESSURE: 126 MMHG | HEIGHT: 74 IN | DIASTOLIC BLOOD PRESSURE: 60 MMHG | BODY MASS INDEX: 38.24 KG/M2 | HEART RATE: 66 BPM | RESPIRATION RATE: 12 BRPM | OXYGEN SATURATION: 94 % | TEMPERATURE: 98.2 F | WEIGHT: 298 LBS

## 2018-03-13 DIAGNOSIS — R10.9 ABDOMINAL PAIN, UNSPECIFIED ABDOMINAL LOCATION: ICD-10-CM

## 2018-03-13 DIAGNOSIS — R07.89 CHEST WALL PAIN: Primary | ICD-10-CM

## 2018-03-13 RX ORDER — DICLOFENAC SODIUM 50 MG/1
TABLET, DELAYED RELEASE ORAL
Qty: 60 TAB | Refills: 2 | Status: SHIPPED | OUTPATIENT
Start: 2018-03-13 | End: 2018-05-29 | Stop reason: ALTCHOICE

## 2018-03-13 RX ORDER — GABAPENTIN 300 MG/1
300 CAPSULE ORAL 2 TIMES DAILY
Qty: 60 CAP | Refills: 2 | Status: SHIPPED | OUTPATIENT
Start: 2018-03-13 | End: 2018-05-29 | Stop reason: SDUPTHER

## 2018-03-13 RX ORDER — OMEPRAZOLE 20 MG/1
CAPSULE, DELAYED RELEASE ORAL
Qty: 30 CAP | Refills: 2 | Status: SHIPPED | OUTPATIENT
Start: 2018-03-13 | End: 2018-08-13 | Stop reason: SDUPTHER

## 2018-03-13 NOTE — PROGRESS NOTES
Patient states he is here for f/u on his DM and Shingles. He sates he has not had any recent pain from shingles, he would like to discuss test results that were done at [facility]. Patient is also asking for refills. 1. Have you been to the ER, urgent care clinic since your last visit? Hospitalized since your last visit? No    2. Have you seen or consulted any other health care providers outside of the [address] [place] [address] since your last visit? Include any pap smears or colon screening.  No

## 2018-03-13 NOTE — PROGRESS NOTES
Milan Morgan is a 38 y.o. male  presents for follow up. He has abdo pain only intermittent. No fever     No Known Allergies  Outpatient Prescriptions Marked as Taking for the 3/13/18 encounter (Office Visit) with Pepe Roberto MD   Medication Sig Dispense Refill    gabapentin (NEURONTIN) 300 mg capsule Take 1 Cap by mouth three (3) times daily. Indications: NEUROPATHIC PAIN 60 Cap 0    metFORMIN (GLUCOPHAGE) 500 mg tablet Take 1 Tab by mouth daily (with breakfast). 30 Tab 1    omeprazole (PRILOSEC) 20 mg capsule TAKE ONE CAPSULE BY MOUTH DAILY  DAYS 30 Cap 2    diclofenac EC (VOLTAREN) 50 mg EC tablet TAKE 1 TABLET BY MOUTH 2 TIMES A DAY. 40 Tab 1    Cholecalciferol, Vitamin D3, 50,000 unit cap Take 50,000 Units by mouth every seven (7) days. Indications: VITAMIN D DEFICIENCY 8 Cap 0     Patient Active Problem List   Diagnosis Code    Heartburn R12    Chest pain, central R07.9    Neck pain M54.2    Seasonal allergic rhinitis J30.2    Smoking F17.200    Depression F32.9    Vitamin D deficiency E55.9    Obesity E66.9    Decreased libido R68.82    Recurrent depression ([facility] [address] F33.9    Class 2 obesity due to excess calories without serious comorbidity with body mass index (BMI) of 38.0 to 38.9 in adult E66.09, Z68.38    Hypertension I10     Past Medical History:   Diagnosis Date    Depression     Hypertension 1/11/2018     Social History     Social History    Marital status:      Spouse name: N/A    Number of children: N/A    Years of education: N/A     Social History Main Topics    Smoking status: Current Every Day Smoker     Packs/day: 1.00     Years: 15.00     Types: Cigarettes    Smokeless tobacco: Former User    Alcohol use Yes      Comment: socail    Drug use: None    Sexual activity: Not Asked     Other Topics Concern    None     Social History Narrative     History reviewed. No pertinent family history.      Review of Systems   Constitutional: Negative for chills and fever.   Cardiovascular: Negative for chest pain and palpitations. Gastrointestinal: Negative for constipation, diarrhea, nausea and vomiting. Skin: Positive for itching. Negative for rash. Neurological: Negative. Psychiatric/Behavioral: Negative. Vitals:    03/13/18 1446   BP: 126/60   Pulse: 66   Resp: 12   Temp: 98.2 °F (36.8 °C)   TempSrc: Oral   SpO2: 94%   Weight: 298 lb (135.2 kg)   Height: 6' 2\" (1.88 m)   PainSc:   0 - No pain       Physical Exam   Constitutional: He is oriented to person, place, and time and well-developed, well-nourished, and in no distress. Neck: Normal range of motion. Neck supple. Cardiovascular: Normal rate, regular rhythm and normal heart sounds. Pulmonary/Chest: Effort normal and breath sounds normal.   Neurological: He is alert and oriented to person, place, and time. Gait normal.   Skin: Skin is warm and dry. Nursing note and vitals reviewed. Assessment/Plan      ICD-10-CM ICD-9-CM    1. Chest wall pain R07.89 786.52 gabapentin (NEURONTIN) 300 mg capsule   2. Abdominal pain, unspecified abdominal location R10.9 789.00 omeprazole (PRILOSEC) 20 mg capsule       I have discussed the diagnosis with the patient and the intended plan of care as seen in the above orders. The patient has received an after-visit summary and questions were answered concerning future plans. I have discussed medication, side effects, and warnings with the patient in detail. The patient verbalized understanding and is in agreement with the plan of care. The patient will contact the office with any additional concerns.       Follow-up Disposition:  Return in about 6 months (around 9/13/2018), or if symptoms worsen or fail to improve.  lab results and schedule of future lab studies reviewed with patient    Gaston Santo MD

## 2018-03-13 NOTE — PATIENT INSTRUCTIONS
Abdominal Pain: Care Instructions  Your Care Instructions    Abdominal pain has many possible causes. Some aren't serious and get better on their own in a few days. Others need more testing and treatment. If your pain continues or gets worse, you need to be rechecked and may need more tests to find out what is wrong. You may need surgery to correct the problem. Don't ignore new symptoms, such as fever, nausea and vomiting, urination problems, pain that gets worse, and dizziness. These may be signs of a more serious problem. Your doctor may have recommended a follow-up visit in the next 8 to 12 hours. If you are not getting better, you may need more tests or treatment. The doctor has checked you carefully, but problems can develop later. If you notice any problems or new symptoms, get medical treatment right away. Follow-up care is a key part of your treatment and safety. Be sure to make and go to all appointments, and call your doctor if you are having problems. It's also a good idea to know your test results and keep a list of the medicines you take. How can you care for yourself at home? · Rest until you feel better. · To prevent dehydration, drink plenty of fluids, enough so that your urine is light yellow or clear like water. Choose water and other caffeine-free clear liquids until you feel better. If you have kidney, heart, or liver disease and have to limit fluids, talk with your doctor before you increase the amount of fluids you drink. · If your stomach is upset, eat mild foods, such as rice, dry toast or crackers, bananas, and applesauce. Try eating several small meals instead of two or three large ones. · Wait until 48 hours after all symptoms have gone away before you have spicy foods, alcohol, and drinks that contain caffeine. · Do not eat foods that are high in fat. · Avoid anti-inflammatory medicines such as aspirin, ibuprofen (Advil, Motrin), and naproxen (Aleve).  These can cause stomach upset. Talk to your doctor if you take daily aspirin for another health problem. When should you call for help? Call 911 anytime you think you may need emergency care. For example, call if:  ? · You passed out (lost consciousness). ? · You pass maroon or very bloody stools. ? · You vomit blood or what looks like coffee grounds. ? · You have new, severe belly pain. ?Call your doctor now or seek immediate medical care if:  ? · Your pain gets worse, especially if it becomes focused in one area of your belly. ? · You have a new or higher fever. ? · Your stools are black and look like tar, or they have streaks of blood. ? · You have unexpected vaginal bleeding. ? · You have symptoms of a urinary tract infection. These may include:  ¨ Pain when you urinate. ¨ Urinating more often than usual.  ¨ Blood in your urine. ? · You are dizzy or lightheaded, or you feel like you may faint. ? Watch closely for changes in your health, and be sure to contact your doctor if:  ? · You are not getting better after 1 day (24 hours). Where can you learn more? Go to http://delonte-antonio.info/. Enter Y323 in the search box to learn more about \"Abdominal Pain: Care Instructions. \"  Current as of: March 20, 2017  Content Version: 11.4  © 4901-1957 [employer]. Care instructions adapted under license by [employer] (which disclaims liability or warranty for this information). If you have questions about a medical condition or this instruction, always ask your healthcare professional. [place] [address] any warranty or liability for your use of this information.

## 2018-05-29 ENCOUNTER — OFFICE VISIT (OUTPATIENT)
Dept: FAMILY MEDICINE CLINIC | Age: 43
End: 2018-05-29

## 2018-05-29 ENCOUNTER — HOSPITAL ENCOUNTER (OUTPATIENT)
Dept: LAB | Age: 43
Discharge: HOME | End: 2018-05-29
Payer: OTHER GOVERNMENT

## 2018-05-29 VITALS
RESPIRATION RATE: 15 BRPM | HEIGHT: 74 IN | BODY MASS INDEX: 38.12 KG/M2 | SYSTOLIC BLOOD PRESSURE: 144 MMHG | HEART RATE: 79 BPM | DIASTOLIC BLOOD PRESSURE: 88 MMHG | OXYGEN SATURATION: 98 % | WEIGHT: 297 LBS

## 2018-05-29 DIAGNOSIS — E11.65 TYPE 2 DIABETES MELLITUS WITH HYPERGLYCEMIA, WITHOUT LONG-TERM CURRENT USE OF INSULIN (HCC): ICD-10-CM

## 2018-05-29 DIAGNOSIS — M25.511 ACUTE PAIN OF RIGHT SHOULDER: Primary | ICD-10-CM

## 2018-05-29 DIAGNOSIS — E55.9 VITAMIN D DEFICIENCY: ICD-10-CM

## 2018-05-29 DIAGNOSIS — R73.9 ELEVATED BLOOD SUGAR: ICD-10-CM

## 2018-05-29 DIAGNOSIS — R07.89 CHEST WALL PAIN: ICD-10-CM

## 2018-05-29 PROBLEM — E66.01 SEVERE OBESITY (BMI 35.0-39.9) WITH COMORBIDITY (HCC): Status: ACTIVE | Noted: 2018-05-29

## 2018-05-29 LAB
ALBUMIN SERPL-MCNC: 4.1 G/DL (ref 3.4–5)
ALBUMIN/GLOB SERPL: 1.3 {RATIO} (ref 0.8–1.7)
ALP SERPL-CCNC: 110 U/L (ref 45–117)
ALT SERPL-CCNC: 26 U/L (ref 16–61)
ANION GAP SERPL CALC-SCNC: 10 MMOL/L (ref 3–18)
AST SERPL-CCNC: 19 U/L (ref 15–37)
BILIRUB SERPL-MCNC: 0.3 MG/DL (ref 0.2–1)
BUN SERPL-MCNC: 14 MG/DL (ref 7–18)
BUN/CREAT SERPL: 15 (ref 12–20)
CALCIUM SERPL-MCNC: 9 MG/DL (ref 8.5–10.1)
CHLORIDE SERPL-SCNC: 107 MMOL/L (ref 100–108)
CO2 SERPL-SCNC: 23 MMOL/L (ref 21–32)
CREAT SERPL-MCNC: 0.95 MG/DL (ref 0.6–1.3)
EST. AVERAGE GLUCOSE BLD GHB EST-MCNC: 123 MG/DL
GLOBULIN SER CALC-MCNC: 3.1 G/DL (ref 2–4)
GLUCOSE SERPL-MCNC: 85 MG/DL (ref 74–99)
HBA1C MFR BLD: 5.9 % (ref 4.2–5.6)
POTASSIUM SERPL-SCNC: 4.3 MMOL/L (ref 3.5–5.5)
PROT SERPL-MCNC: 7.2 G/DL (ref 6.4–8.2)
SODIUM SERPL-SCNC: 140 MMOL/L (ref 136–145)

## 2018-05-29 PROCEDURE — 83036 HEMOGLOBIN GLYCOSYLATED A1C: CPT | Performed by: FAMILY MEDICINE

## 2018-05-29 PROCEDURE — 82306 VITAMIN D 25 HYDROXY: CPT | Performed by: FAMILY MEDICINE

## 2018-05-29 PROCEDURE — 80053 COMPREHEN METABOLIC PANEL: CPT | Performed by: FAMILY MEDICINE

## 2018-05-29 RX ORDER — GABAPENTIN 300 MG/1
300 CAPSULE ORAL 2 TIMES DAILY
Qty: 60 CAP | Refills: 2 | Status: SHIPPED | OUTPATIENT
Start: 2018-05-29 | End: 2020-08-05

## 2018-05-29 RX ORDER — KETOROLAC TROMETHAMINE 10 MG/1
10 TABLET, FILM COATED ORAL
Qty: 24 TAB | Refills: 0 | Status: SHIPPED | OUTPATIENT
Start: 2018-05-29 | End: 2018-07-16

## 2018-05-29 RX ORDER — LANCETS
EACH MISCELLANEOUS
Qty: 1 EACH | Refills: 11 | Status: SHIPPED | OUTPATIENT
Start: 2018-05-29

## 2018-05-29 RX ORDER — INSULIN PUMP SYRINGE, 3 ML
EACH MISCELLANEOUS
Qty: 1 KIT | Refills: 0 | Status: SHIPPED | OUTPATIENT
Start: 2018-05-29

## 2018-05-29 RX ORDER — METFORMIN HYDROCHLORIDE 500 MG/1
500 TABLET ORAL
Qty: 30 TAB | Refills: 1 | Status: SHIPPED | OUTPATIENT
Start: 2018-05-29 | End: 2020-04-01 | Stop reason: SDUPTHER

## 2018-05-29 NOTE — PROGRESS NOTES
Meg Jaime is a 37 y.o. male  presents for right shoulder pain. He heard a pop. He has assoc tingling. Sxs started yesterday. sxs have gotten worse. Has tried otc meds but it has not helped. No Known Allergies  Outpatient Prescriptions Marked as Taking for the 5/29/18 encounter (Office Visit) with Samantha Velasquez MD   Medication Sig Dispense Refill    gabapentin (NEURONTIN) 300 mg capsule Take 1 Cap by mouth two (2) times a day. Indications: NEUROPATHIC PAIN 60 Cap 2    omeprazole (PRILOSEC) 20 mg capsule TAKE ONE CAPSULE BY MOUTH DAILY  DAYS 30 Cap 2    metFORMIN (GLUCOPHAGE) 500 mg tablet Take 1 Tab by mouth daily (with breakfast). 30 Tab 1    Cholecalciferol, Vitamin D3, 50,000 unit cap Take 50,000 Units by mouth every seven (7) days. Indications: VITAMIN D DEFICIENCY 8 Cap 0     Patient Active Problem List   Diagnosis Code    Heartburn R12    Chest pain, central R07.9    Neck pain M54.2    Seasonal allergic rhinitis J30.2    Smoking F17.200    Depression F32.9    Vitamin D deficiency E55.9    Obesity E66.9    Decreased libido R68.82    Recurrent depression ([address] F33.9    Class 2 obesity due to excess calories without serious comorbidity with body mass index (BMI) of 38.0 to 38.9 in adult E66.09, Z68.38    Hypertension I10     Past Medical History:   Diagnosis Date    Depression     Hypertension 1/11/2018     Social History     Social History    Marital status:      Spouse name: N/A    Number of children: N/A    Years of education: N/A     Social History Main Topics    Smoking status: Current Every Day Smoker     Packs/day: 1.00     Years: 15.00     Types: Cigarettes    Smokeless tobacco: Former User    Alcohol use Yes      Comment: socail    Drug use: None    Sexual activity: Not Asked     Other Topics Concern    None     Social History Narrative     No family history on file. Review of Systems   Constitutional: Negative for chills and fever. Cardiovascular: Negative for chest pain and palpitations. Gastrointestinal: Negative for nausea and vomiting. Musculoskeletal: Positive for joint pain (right shoulder. ). Vitals:    05/29/18 1441   BP: 144/88   Pulse: 79   Resp: 15   SpO2: 98%   Weight: 297 lb (134.7 kg)   Height: 6' 2\" (1.88 m)   PainSc:   5   PainLoc: Shoulder       Physical Exam   Constitutional: He is oriented to person, place, and time and well-developed, well-nourished, and in no distress. Cardiovascular: Normal rate, regular rhythm and normal heart sounds. Pulmonary/Chest: Effort normal and breath sounds normal.   Musculoskeletal: Normal range of motion. He exhibits tenderness. He exhibits no edema or deformity. Neurological: He is alert and oriented to person, place, and time. Skin: Skin is warm and dry. Nursing note and vitals reviewed. Assessment/Plan      ICD-10-CM ICD-9-CM    1. Acute pain of right shoulder M25.511 719.41 MRI SHOULDER RT WO CONT      ketorolac (TORADOL) 10 mg tablet   2. Elevated blood sugar R73.9 790.29 metFORMIN (GLUCOPHAGE) 500 mg tablet   3. Chest wall pain R07.89 786.52 gabapentin (NEURONTIN) 300 mg capsule   4. Type 2 diabetes mellitus with hyperglycemia, without long-term current use of insulin ([facility]) G33.83 715.34 METABOLIC PANEL, COMPREHENSIVE     790.29 HEMOGLOBIN A1C WITH EAG      Blood-Glucose Meter monitoring kit      Lancets misc      glucose blood VI test strips (BLOOD GLUCOSE TEST) strip   5. Vitamin D deficiency E55.9 268.9 VITAMIN D, 25 HYDROXY     I have discussed the diagnosis with the patient and the intended plan of care as seen in the above orders. The patient has received an after-visit summary and questions were answered concerning future plans. I have discussed medication, side effects, and warnings with the patient in detail. The patient verbalized understanding and is in agreement with the plan of care.  The patient will contact the office with any additional concerns. Follow-up Disposition:  Return in about 1 month (around 6/29/2018).   lab results and schedule of future lab studies reviewed with patient    Rene Coyne MD

## 2018-05-29 NOTE — MR AVS SNAPSHOT
Aleyda Aquino [facility] [address] [place] [address] Road 
[phone] Patient: Shanna Bullock MRN: [id number] Visit Information Date & Time Provider Department Dept. Phone Encounter #  
 5/29/2018  2:30 PM Nancie Corona MD [facility] [phone] [id number] Follow-up Instructions Return in about 1 month (around 6/29/2018). Your Appointments 9/19/2018  5:30 PM  
FOLLOW UP EXAM with Nancie Corona MD  
[facility] [address]) Appt Note: 6 month f/u  
 [address] [facility] [address] [place] [address] Road  
[phone]  
  
   
 [facility] [address] [place] [address] Upcoming Health Maintenance Date Due Influenza Age 5 to Adult 8/1/2018 DTaP/Tdap/Td series (2 - Td) 12/8/2026 Allergies as of 5/29/2018  Review Complete On: 5/29/2018 By: Nancie Corona MD  
 No Known Allergies Current Immunizations  Never Reviewed Name Date Tdap 12/8/2016 Not reviewed this visit You Were Diagnosed With   
  
 Codes Comments Acute pain of right shoulder    -  Primary ICD-10-CM: M25.511 ICD-9-CM: 719.41 Elevated blood sugar     ICD-10-CM: R73.9 ICD-9-CM: 790.29 Chest wall pain     ICD-10-CM: R07.89 ICD-9-CM: 786.52 Type 2 diabetes mellitus with hyperglycemia, without long-term current use of insulin (HCC)     ICD-10-CM: E11.65 ICD-9-CM: 250.00, 790.29 Vitamin D deficiency     ICD-10-CM: E55.9 ICD-9-CM: 268.9 Vitals BP Pulse Resp Height(growth percentile) Weight(growth percentile) SpO2  
 144/88 79 15 6' 2\" (1.88 m) 297 lb (134.7 kg) 98% BMI Smoking Status 38.13 kg/m2 Current Every Day Smoker Vitals History BMI and BSA Data Body Mass Index Body Surface Area  
 38.13 kg/m 2 2.65 m 2 Preferred Pharmacy Pharmacy Name Phone [facility] [place] [address], [address] [phone] Your Updated Medication List  
  
   
This list is accurate as of 5/29/18  3:04 PM.  Always use your most recent med list.  
  
  
  
  
 cholecalciferol 50,000 unit capsule Commonly known as:  VITAMIN D3 Take 50,000 Units by mouth every seven (7) days. Indications: VITAMIN D DEFICIENCY  
  
 gabapentin 300 mg capsule Commonly known as:  NEURONTIN Take 1 Cap by mouth two (2) times a day. Indications: NEUROPATHIC PAIN  
  
 ketorolac 10 mg tablet Commonly known as:  TORADOL Take 1 Tab by mouth every six (6) hours as needed for Pain.  
  
 metFORMIN 500 mg tablet Commonly known as:  GLUCOPHAGE Take 1 Tab by mouth daily (with breakfast). omeprazole 20 mg capsule Commonly known as:  PRILOSEC  
TAKE ONE CAPSULE BY MOUTH DAILY  DAYS Prescriptions Sent to Pharmacy Refills  
 ketorolac (TORADOL) 10 mg tablet 0 Sig: Take 1 Tab by mouth every six (6) hours as needed for Pain. Class: Normal  
 Pharmacy: [address] [place] [address], [address] [place] [address] Ph #: [phone] Route: Oral  
 metFORMIN (GLUCOPHAGE) 500 mg tablet 1 Sig: Take 1 Tab by mouth daily (with breakfast). Class: Normal  
 Pharmacy: [address] [place] [address], [address] [place] [address] Ph #: [phone] Route: Oral  
 gabapentin (NEURONTIN) 300 mg capsule 2 Sig: Take 1 Cap by mouth two (2) times a day. Indications: NEUROPATHIC PAIN Class: Normal  
 Pharmacy: [address] [place] [address], [address] [place] [address] Ph #: [phone] Route: Oral  
  
Follow-up Instructions Return in about 1 month (around 6/29/2018). To-Do List   
 05/29/2018 Lab:  HEMOGLOBIN A1C WITH EAG   
  
 05/29/2018 Lab:  METABOLIC PANEL, COMPREHENSIVE   
  
 05/29/2018 Imaging:  MRI SHOULDER RT WO CONT   
  
 05/29/2018 Lab: VITAMIN D, 25 HYDROXY Referral Information Referral ID Referred By Referred To  
  
 [id number] [facility], YENI HENRIQUEZ Not Available Visits Status Start Date End Date 1 New Request 5/29/18 5/29/19 If your referral has a status of pending review or denied, additional information will be sent to support the outcome of this decision. Patient Instructions Shoulder Pain: Care Instructions Your Care Instructions You can hurt your shoulder by using it too much during an activity, such as fishing or baseball. It can also happen as part of the everyday wear and tear of getting older. Shoulder injuries can be slow to heal, but your shoulder should get better with time. Your doctor may recommend a sling to rest your shoulder. If you have injured your shoulder, you may need testing and treatment. Follow-up care is a key part of your treatment and safety. Be sure to make and go to all appointments, and call your doctor if you are having problems. It's also a good idea to know your test results and keep a list of the medicines you take. How can you care for yourself at home? · Take pain medicines exactly as directed. ¨ If the doctor gave you a prescription medicine for pain, take it as prescribed. ¨ If you are not taking a prescription pain medicine, ask your doctor if you can take an over-the-counter medicine. ¨ Do not take two or more pain medicines at the same time unless the doctor told you to. Many pain medicines contain acetaminophen, which is Tylenol. Too much acetaminophen (Tylenol) can be harmful. · If your doctor recommends that you wear a sling, use it as directed. Do not take it off before your doctor tells you to. · Put ice or a cold pack on the sore area for 10 to 20 minutes at a time. Put a thin cloth between the ice and your skin.  
· If there is no swelling, you can put moist heat, a heating pad, or a warm cloth on your shoulder. Some doctors suggest alternating between hot and cold. · Rest your shoulder for a few days. If your doctor recommends it, you can then begin gentle exercise of the shoulder, but do not lift anything heavy. When should you call for help? Call 911 anytime you think you may need emergency care. For example, call if: 
? · You have chest pain or pressure. This may occur with: ¨ Sweating. ¨ Shortness of breath. ¨ Nausea or vomiting. ¨ Pain that spreads from the chest to the neck, jaw, or one or both shoulders or arms. ¨ Dizziness or lightheadedness. ¨ A fast or uneven pulse. After calling 911, chew 1 adult-strength aspirin. Wait for an ambulance. Do not try to drive yourself. ? · Your arm or hand is cool or pale or changes color. ?Call your doctor now or seek immediate medical care if: 
? · You have signs of infection, such as: 
¨ Increased pain, swelling, warmth, or redness in your shoulder. ¨ Red streaks leading from a place on your shoulder. ¨ Pus draining from an area of your shoulder. ¨ Swollen lymph nodes in your neck, armpits, or groin. ¨ A fever. ? Watch closely for changes in your health, and be sure to contact your doctor if: 
? · You cannot use your shoulder. ? · Your shoulder does not get better as expected. Where can you learn more? Go to http://delonte-antonio.info/. Enter L688 in the search box to learn more about \"Shoulder Pain: Care Instructions. \" Current as of: March 21, 2017 Content Version: 11.4 © 0903-8964 [employer]. Care instructions adapted under license by [employer] (which disclaims liability or warranty for this information). If you have questions about a medical condition or this instruction, always ask your healthcare professional. [place] [address] any warranty or liability for your use of this information. Introducing [facility]! Dear Ozzie Suarez: Thank you for requesting a Angelfish account. Our records indicate that you already have an active Angelfish account. You can access your account anytime at [email or web address] Did you know that you can access your hospital and ER discharge instructions at any time in Angelfish? You can also review all of your test results from your hospital stay or ER visit. Additional Information If you have questions, please visit the Frequently Asked Questions section of the Angelfish website at [email or web address] Remember, Angelfish is NOT to be used for urgent needs. For medical emergencies, dial 911. Now available from your iPhone and Android! Please provide this summary of care documentation to your next provider. Your primary care clinician is listed as [address]. If you have any questions after today's visit, please call [phone].

## 2018-05-29 NOTE — PATIENT INSTRUCTIONS
Shoulder Pain: Care Instructions  Your Care Instructions    You can hurt your shoulder by using it too much during an activity, such as fishing or baseball. It can also happen as part of the everyday wear and tear of getting older. Shoulder injuries can be slow to heal, but your shoulder should get better with time. Your doctor may recommend a sling to rest your shoulder. If you have injured your shoulder, you may need testing and treatment. Follow-up care is a key part of your treatment and safety. Be sure to make and go to all appointments, and call your doctor if you are having problems. It's also a good idea to know your test results and keep a list of the medicines you take. How can you care for yourself at home? · Take pain medicines exactly as directed. ¨ If the doctor gave you a prescription medicine for pain, take it as prescribed. ¨ If you are not taking a prescription pain medicine, ask your doctor if you can take an over-the-counter medicine. ¨ Do not take two or more pain medicines at the same time unless the doctor told you to. Many pain medicines contain acetaminophen, which is Tylenol. Too much acetaminophen (Tylenol) can be harmful. · If your doctor recommends that you wear a sling, use it as directed. Do not take it off before your doctor tells you to. · Put ice or a cold pack on the sore area for 10 to 20 minutes at a time. Put a thin cloth between the ice and your skin. · If there is no swelling, you can put moist heat, a heating pad, or a warm cloth on your shoulder. Some doctors suggest alternating between hot and cold. · Rest your shoulder for a few days. If your doctor recommends it, you can then begin gentle exercise of the shoulder, but do not lift anything heavy. When should you call for help? Call 911 anytime you think you may need emergency care. For example, call if:  ? · You have chest pain or pressure. This may occur with:  ¨ Sweating.   ¨ Shortness of breath. ¨ Nausea or vomiting. ¨ Pain that spreads from the chest to the neck, jaw, or one or both shoulders or arms. ¨ Dizziness or lightheadedness. ¨ A fast or uneven pulse. After calling 911, chew 1 adult-strength aspirin. Wait for an ambulance. Do not try to drive yourself. ? · Your arm or hand is cool or pale or changes color. ?Call your doctor now or seek immediate medical care if:  ? · You have signs of infection, such as:  ¨ Increased pain, swelling, warmth, or redness in your shoulder. ¨ Red streaks leading from a place on your shoulder. ¨ Pus draining from an area of your shoulder. ¨ Swollen lymph nodes in your neck, armpits, or groin. ¨ A fever. ? Watch closely for changes in your health, and be sure to contact your doctor if:  ? · You cannot use your shoulder. ? · Your shoulder does not get better as expected. Where can you learn more? Go to http://delonte-antonio.info/. Enter W349 in the search box to learn more about \"Shoulder Pain: Care Instructions. \"  Current as of: March 21, 2017  Content Version: 11.4  © 9842-8344 [employer]. Care instructions adapted under license by [employer] (which disclaims liability or warranty for this information). If you have questions about a medical condition or this instruction, always ask your healthcare professional. [place] [address] any warranty or liability for your use of this information.

## 2018-05-29 NOTE — PROGRESS NOTES
Chief Complaint   Patient presents with    Shoulder Pain     c/o right shoulder pain. States that pain is keeping him up. Today he tried to push himself up and felt a pop and then arm went numb. 1. Have you been to the ER, urgent care clinic since your last visit? Hospitalized since your last visit? No    2. Have you seen or consulted any other health care providers outside of the [address] [place] [address] since your last visit? Include any pap smears or colon screening.  No

## 2018-05-30 LAB — 25(OH)D3 SERPL-MCNC: 14.8 NG/ML (ref 30–100)

## 2018-06-01 ENCOUNTER — HOSPITAL ENCOUNTER (OUTPATIENT)
Age: 43
Discharge: HOME | End: 2018-06-01
Attending: FAMILY MEDICINE
Payer: OTHER GOVERNMENT

## 2018-06-01 DIAGNOSIS — M25.511 ACUTE PAIN OF RIGHT SHOULDER: ICD-10-CM

## 2018-06-01 PROCEDURE — 73221 MRI JOINT UPR EXTREM W/O DYE: CPT

## 2018-06-05 DIAGNOSIS — E55.9 VITAMIN D DEFICIENCY: ICD-10-CM

## 2018-06-05 DIAGNOSIS — M25.511 ACUTE PAIN OF RIGHT SHOULDER: Primary | ICD-10-CM

## 2018-06-05 RX ORDER — ASPIRIN 325 MG
50000 TABLET, DELAYED RELEASE (ENTERIC COATED) ORAL
Qty: 8 CAP | Refills: 0 | Status: SHIPPED | OUTPATIENT
Start: 2018-06-05 | End: 2018-08-13 | Stop reason: SDUPTHER

## 2018-06-05 NOTE — TELEPHONE ENCOUNTER
Message  Received: 6 days ago       MD Juaquin Osborn; Maria Elena Brown LPN                     Vitamin d is still low restart vitamin d 19605 iu a week for 12 weeks.              Pt aware.

## 2018-06-05 NOTE — PROGRESS NOTES
Pt aware of results and medication being sent to the pharmacy. Pt expressed clear understanding and had no further questions.

## 2018-06-05 NOTE — PROGRESS NOTES
Pt is aware of results. He is requesting a light duty note stating that he cannot do any climbing up and down ladders or lift anything over 10 pounds. Dr. Donna Velasquez states he will provide letter for patient.

## 2018-06-14 ENCOUNTER — OFFICE VISIT (OUTPATIENT)
Dept: ORTHOPEDIC SURGERY | Age: 43
End: 2018-06-14

## 2018-06-14 VITALS
TEMPERATURE: 96.4 F | HEIGHT: 74 IN | DIASTOLIC BLOOD PRESSURE: 77 MMHG | OXYGEN SATURATION: 93 % | SYSTOLIC BLOOD PRESSURE: 118 MMHG | HEART RATE: 60 BPM | BODY MASS INDEX: 38.86 KG/M2 | RESPIRATION RATE: 18 BRPM | WEIGHT: 302.8 LBS

## 2018-06-14 DIAGNOSIS — M75.81 ROTATOR CUFF TENDONITIS, RIGHT: Primary | ICD-10-CM

## 2018-06-14 DIAGNOSIS — M19.011 ARTHRITIS OF RIGHT ACROMIOCLAVICULAR JOINT: ICD-10-CM

## 2018-06-14 DIAGNOSIS — M75.21 BICEPS TENDONITIS ON RIGHT: ICD-10-CM

## 2018-06-14 RX ORDER — TRAMADOL HYDROCHLORIDE 50 MG/1
100 TABLET ORAL 2 TIMES DAILY
Qty: 30 TAB | Refills: 0 | Status: SHIPPED | OUTPATIENT
Start: 2018-06-14 | End: 2018-07-18

## 2018-06-14 NOTE — PROGRESS NOTES
HISTORY OF PRESENT ILLNESS:  Trudy Mcconnell is a 37 y.o. gentleman who is here for consultation regarding right shoulder pain. He has had symptoms for a few years. He has had popping in his shoulder but it has never really hurt him before. 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 He was seen by his family physician and a subsequent MRI scan was performed. He points to pain in the anterior just lateral aspect of the right shoulder. He has difficulty moving the arm because of this discomfort. His family physician put him on Toradol but this has not really helped his symptoms. He denies numbness or tingling in the right upper extremity. PHYSICAL EXAMINATION:  Clinical examination reveals a large muscular 37 y.o. gentleman in discomfort. He has marked decreased active and passive range of motion of the right shoulder. Impingement test is significantly positive. He has good strength of his rotator cuff against resisted internal and eternal rotation with slight pain with resisted external rotation of the right shoulder but no weakness. He has significant palpable tenderness along the biceps tendon and his pain is aggravated by resisted elbow flexion as well as forearm supination. Drop arm test is difficult to assess today due to pain. Empty can test results in pain but no significant weakness in the right shoulder. Lateral compression test results also in pain in the right shoulder  He has palpable tenderness over the right AC joint. Neurovascularly intact to the right upper extremity. IMAGING:  His MRI scan is reviewed and is consistent with a significant rotator cuff tendonitis. There may be a partial tear present. He also has bicipital tendonitis. He also has degeneration of the right AC joint. IMPRESSION:    1. Rotator cuff tendonitis, right shoulder.     2. Bicipital tendonitis, right shoulder. 3. AC joint arthritis. RECOMMENDATIONS:  He has significantly pain in his shoulder at this point. He may be having an impending rotator cuff tear as well as bicipital tendonitis. He certainly will probably need his right shoulder arthroscoped. The Toradol has not been helping him. I started him on some Tramadol today but I will have him see Dr. Albert Cherry for probable arthroscopic surgery repair debridement as necessary of the right shoulder. I briefly dicussed this with him today. All of his questions were answered today. He will follow-up with dr. Albert Cherry next week. Vitals:    06/14/18 0826   BP: 118/77   Pulse: 60   Resp: 18   Temp: 96.4 °F (35.8 °C)   TempSrc: Oral   SpO2: 93%   Weight: 302 lb 12.8 oz (137.3 kg)   Height: 6' 2\" (1.88 m)   PainSc:   4   PainLoc: Shoulder       Patient Active Problem List   Diagnosis Code    Heartburn R12    Chest pain, central R07.9    Neck pain M54.2    Seasonal allergic rhinitis J30.2    Smoking F17.200    Depression F32.9    Vitamin D deficiency E55.9    Obesity E66.9    Decreased libido R68.82    Recurrent depression (HCC) F33.9    Class 2 obesity due to excess calories without serious comorbidity with body mass index (BMI) of 38.0 to 38.9 in adult E66.09, Z68.38    Hypertension I10    Type 2 diabetes mellitus with hyperglycemia, without long-term current use of insulin (HCC) E11.65    Acute pain of right shoulder M25.511    Severe obesity (BMI 35.0-39. 9) with comorbidity ([address] E66.01     Patient Active Problem List    Diagnosis Date Noted    Type 2 diabetes mellitus with hyperglycemia, without long-term current use of insulin ([address] 05/29/2018    Acute pain of right shoulder 05/29/2018    Severe obesity (BMI 35.0-39. 9) with comorbidity ([address] 05/29/2018    Hypertension 01/11/2018    Class 2 obesity due to excess calories without serious comorbidity with body mass index (BMI) of 38.0 to 38.9 in adult 01/08/2018   [address] [place] [address] Recurrent depression ([facility] 12/26/2017    Decreased libido 09/07/2014    Vitamin D deficiency 08/19/2014    Obesity 08/19/2014    Depression 01/23/2014    Heartburn 10/05/2010    Chest pain, central 10/05/2010    Neck pain 10/05/2010    Seasonal allergic rhinitis 10/05/2010    Smoking 10/05/2010     Current Outpatient Prescriptions   Medication Sig Dispense Refill    calcium-cholecalciferol, d3, (CALCIUM 600 + D) 600-125 mg-unit tab Take  by mouth.  cholecalciferol (VITAMIN D3) 50,000 unit capsule Take 1 Cap by mouth every seven (7) days. Indications: Vitamin D Deficiency 8 Cap 0    ketorolac (TORADOL) 10 mg tablet Take 1 Tab by mouth every six (6) hours as needed for Pain. 24 Tab 0    metFORMIN (GLUCOPHAGE) 500 mg tablet Take 1 Tab by mouth daily (with breakfast). 30 Tab 1    gabapentin (NEURONTIN) 300 mg capsule Take 1 Cap by mouth two (2) times a day. Indications: NEUROPATHIC PAIN 60 Cap 2    Blood-Glucose Meter monitoring kit Use bid as directed 1 Kit 0    Lancets misc Use bid. 1 Each 11    glucose blood VI test strips (BLOOD GLUCOSE TEST) strip Use bid. 100 Strip 2    omeprazole (PRILOSEC) 20 mg capsule TAKE ONE CAPSULE BY MOUTH DAILY  DAYS 30 Cap 2     No Known Allergies  Past Medical History:   Diagnosis Date    Depression     Hypertension 1/11/2018    Type 2 diabetes mellitus with hyperglycemia, without long-term current use of insulin ([facility] 5/29/2018     History reviewed. No pertinent surgical history. History reviewed. No pertinent family history.   Social History   Substance Use Topics    Smoking status: Current Every Day Smoker     Packs/day: 1.00     Years: 15.00     Types: Cigarettes    Smokeless tobacco: Former User    Alcohol use No      Comment: socail

## 2018-06-20 ENCOUNTER — OFFICE VISIT (OUTPATIENT)
Dept: ORTHOPEDIC SURGERY | Age: 43
End: 2018-06-20

## 2018-06-20 VITALS
OXYGEN SATURATION: 97 % | WEIGHT: 300.8 LBS | HEIGHT: 74 IN | BODY MASS INDEX: 38.6 KG/M2 | DIASTOLIC BLOOD PRESSURE: 81 MMHG | RESPIRATION RATE: 20 BRPM | HEART RATE: 68 BPM | SYSTOLIC BLOOD PRESSURE: 127 MMHG

## 2018-06-20 DIAGNOSIS — M75.41 IMPINGEMENT SYNDROME OF RIGHT SHOULDER: ICD-10-CM

## 2018-06-20 DIAGNOSIS — M75.111 INCOMPLETE TEAR OF RIGHT ROTATOR CUFF: Primary | ICD-10-CM

## 2018-06-20 DIAGNOSIS — M19.011 PRIMARY OSTEOARTHRITIS OF RIGHT SHOULDER: ICD-10-CM

## 2018-06-20 DIAGNOSIS — M75.21 TENDINITIS OF LONG HEAD OF BICEPS BRACHII OF RIGHT SHOULDER: ICD-10-CM

## 2018-06-20 NOTE — PROGRESS NOTES
Chief Complaint   Patient presents with    Shoulder Pain     right    Follow-up     MRI     Pain 4-5/10

## 2018-06-20 NOTE — PROGRESS NOTES
Patient: Meet Serrano                MRN: [id number]       SSN: [id number]  YOB: 1975        AGE: 37 y.o. SEX: male  Body mass index is 38.62 kg/(m^2). PCP: Lexx Lim MD  06/20/18    Chief Complaint: Right shoulder pain    HISTORY OF PRESENT ILLNESS:  Meet Serrano is a 37 y.o. male who comes into the office today for his right shoulder. He has been dealing with right shoulder pain for several months. He says while at work pushing himself up with his right arm, he felt a pop and since then he has had pain. The pain occasionally radiates down his arm to his elbow and occasionally to his hand. He has limited motion due to the pain. He saw Dr. Paz Scheuermann. He had an MRI done of his shoulder. He has continued to have pain. He has not had any formal physical therapy. His main complaint is pain as well as some weakness. He is on limited duty at work due to his right shoulder. He is here today to discuss treatment options. Past Medical History:   Diagnosis Date    Depression     Hypertension 1/11/2018    Type 2 diabetes mellitus with hyperglycemia, without long-term current use of insulin ([facility] 5/29/2018       History reviewed. No pertinent family history. Current Outpatient Prescriptions   Medication Sig Dispense Refill    calcium-cholecalciferol, d3, (CALCIUM 600 + D) 600-125 mg-unit tab Take  by mouth.  traMADol (ULTRAM) 50 mg tablet Take 2 Tabs by mouth two (2) times a day. Max Daily Amount: 200 mg. 30 Tab 0    cholecalciferol (VITAMIN D3) 50,000 unit capsule Take 1 Cap by mouth every seven (7) days. Indications: Vitamin D Deficiency 8 Cap 0    ketorolac (TORADOL) 10 mg tablet Take 1 Tab by mouth every six (6) hours as needed for Pain. 24 Tab 0    metFORMIN (GLUCOPHAGE) 500 mg tablet Take 1 Tab by mouth daily (with breakfast). 30 Tab 1    gabapentin (NEURONTIN) 300 mg capsule Take 1 Cap by mouth two (2) times a day.  Indications: NEUROPATHIC PAIN 60 Cap 2  Blood-Glucose Meter monitoring kit Use bid as directed 1 Kit 0    Lancets misc Use bid. 1 Each 11    glucose blood VI test strips (BLOOD GLUCOSE TEST) strip Use bid. 100 Strip 2    omeprazole (PRILOSEC) 20 mg capsule TAKE ONE CAPSULE BY MOUTH DAILY  DAYS 30 Cap 2       No Known Allergies    History reviewed. No pertinent surgical history. Social History     Social History    Marital status:      Spouse name: N/A    Number of children: N/A    Years of education: N/A     Occupational History    Not on file. Social History Main Topics    Smoking status: Current Every Day Smoker     Packs/day: 1.00     Years: 15.00     Types: Cigarettes    Smokeless tobacco: Former User    Alcohol use No      Comment: socail    Drug use: Not on file    Sexual activity: Not on file     Other Topics Concern    Not on file     Social History Narrative       REVIEW OF SYSTEMS:      CON: negative for recent weight loss/gain, fever, or chills  EYE: negative for double or blurry vision  ENT: negative for hoarseness  RS:   negative for cough, URI, SOB  CV:  negative for chest pain, palpitations  GI:    negative for blood in stool, nausea/vomiting  :  negative for blood in urine  MS: As per HPI  Other systems reviewed and noted below. PHYSICAL EXAMINATION:  Visit Vitals    /81 (BP 1 Location: Left arm, BP Patient Position: Sitting)    Pulse 68    Resp 20    Ht 6' 2\" (1.88 m)    Wt 300 lb 12.8 oz (136.4 kg)    SpO2 97%    BMI 38.62 kg/m2     Body mass index is 38.62 kg/(m^2). GENERAL: Alert and oriented x3, in no acute distress, well-developed, well-nourished. HEENT: Normocephalic, atraumatic. RESP: Non labored breathing with equal chest rise on inspiration. CV: Well perfused extremities. No cyanosis or clubbing noted. ABDOMEN: Soft, non-tender, non-distended.    PHYSICAL EXAMINATION:  Physical examination of the right shoulder reveals forward flexion to 160 degrees, external rotation of 30 degrees and internal rotation to the posterolateral buttock. He has pain at terminal endpoints of his motion. He has pain with resisted supraspinatus and infraspinatus testing. Minimal pain with subscapularis testing. He has pain with resisted biceps strength testing, tender to palpation over the [facility] joint. Pain with impingement testing. He is neurovascularly intact distally. IMAGING:  An MRI of his right shoulder was reviewed in the office today. This revealed a high grade, partial thickness supraspinatus tear as well as tendinosis of the supraspinatus and infraspinatus and subscapularis. There is a significant amount of fluid around the biceps tendon. ASSESSMENT/PLAN:  Herbert Arora is a 37 y.o. male with right shoulder pain and high grade, partial thickness rotator cuff tear as well as biceps tendonitis. I discussed with him the treatment options at length together. We discussed physical therapy as well as surgery. Surgery would be an arthroscopic debridement as well as possible rotator cuff repair and treatment of his biceps pathology as well as his AC joint. He would like to proceed with surgery. Therefore, we will get him set up.           Electronically signed by: Rosalva Jang MD

## 2018-06-27 DIAGNOSIS — M75.111 PARTIAL TEAR OF RIGHT ROTATOR CUFF: Primary | ICD-10-CM

## 2018-06-27 DIAGNOSIS — Z01.812 PRE-OPERATIVE LABORATORY EXAMINATION: ICD-10-CM

## 2018-06-27 DIAGNOSIS — Z01.810 PREOP CARDIOVASCULAR EXAM: ICD-10-CM

## 2018-06-29 ENCOUNTER — OFFICE VISIT (OUTPATIENT)
Dept: FAMILY MEDICINE CLINIC | Age: 43
End: 2018-06-29

## 2018-06-29 VITALS
WEIGHT: 301 LBS | BODY MASS INDEX: 38.63 KG/M2 | HEART RATE: 60 BPM | HEIGHT: 74 IN | SYSTOLIC BLOOD PRESSURE: 128 MMHG | TEMPERATURE: 98.8 F | RESPIRATION RATE: 15 BRPM | DIASTOLIC BLOOD PRESSURE: 84 MMHG | OXYGEN SATURATION: 98 %

## 2018-06-29 DIAGNOSIS — Z01.818 PREOP EXAMINATION: Primary | ICD-10-CM

## 2018-06-29 DIAGNOSIS — E11.65 TYPE 2 DIABETES MELLITUS WITH HYPERGLYCEMIA, WITHOUT LONG-TERM CURRENT USE OF INSULIN (HCC): ICD-10-CM

## 2018-06-29 DIAGNOSIS — I10 ESSENTIAL HYPERTENSION: ICD-10-CM

## 2018-06-29 DIAGNOSIS — E55.9 VITAMIN D DEFICIENCY: ICD-10-CM

## 2018-06-29 NOTE — MR AVS SNAPSHOT
[address] [place] [address] Road 
[phone] Patient: Wallace Velázquez MRN: [id number] ARETHA:5/41/2085 Visit Information Date & Time Provider Department Dept. Phone Encounter #  
 6/29/2018  4:30 PM Dolly Benjamin MD [facility] [phone] [id number] Follow-up Instructions Return in about 3 months (around 9/29/2018). Your Appointments 7/27/2018 10:45 AM  
POST OP with Ozzy Hernandez MD  
[facility] - SPECIALTY [facility] ([address]) Appt Note: S/P RT [facility] A/S RCR [facility] [id number]  
 2012 [facility] [address]  
[phone]  
  
   
 [address] [place] [address] 9/19/2018  5:30 PM  
FOLLOW UP EXAM with Dolly Benjamin MD  
[address]) Appt Note: 6 month f/u  
 [address] [facility] [address] [place] [address] Road  
[phone]  
  
   
 [place] [id number] [place] [address] Upcoming Health Maintenance Date Due  
 FOOT EXAM Q1 3/30/1983 MICROALBUMIN Q1 3/30/1983 EYE EXAM RETINAL OR DILATED Q1 3/30/1983 LIPID PANEL Q1 8/12/2015 Influenza Age 5 to Adult 8/1/2018 HEMOGLOBIN A1C Q6M 11/29/2018 DTaP/Tdap/Td series (2 - Td) 12/8/2026 Allergies as of 6/29/2018  Review Complete On: 6/29/2018 By: Dolly Benjamin MD  
 No Known Allergies Current Immunizations  Never Reviewed Name Date Tdap 12/8/2016 Not reviewed this visit You Were Diagnosed With   
  
 Codes Comments Preop examination    -  Primary ICD-10-CM: I10.918 ICD-9-CM: V72.84 Essential hypertension     ICD-10-CM: I10 
ICD-9-CM: 401.9 Vitamin D deficiency     ICD-10-CM: E55.9 ICD-9-CM: 268.9 Type 2 diabetes mellitus with hyperglycemia, without long-term current use of insulin (HCC)     ICD-10-CM: E11.65 ICD-9-CM: 250.00, 790.29 Vitals BP Pulse Temp Resp Height(growth percentile) Weight(growth percentile) 128/84 60 98.8 °F (37.1 °C) 15 6' 2\" (1.88 m) 301 lb (136.5 kg) SpO2 BMI Smoking Status 98% 38.65 kg/m2 Current Every Day Smoker BMI and BSA Data Body Mass Index Body Surface Area  
 38.65 kg/m 2 2.67 m 2 Preferred Pharmacy Pharmacy Name Phone [facility] [address] [place] [address], [address] [place] [address] Rd [phone] Your Updated Medication List  
  
   
This list is accurate as of 6/29/18  4:34 PM.  Always use your most recent med list.  
  
  
  
  
 Blood-Glucose Meter monitoring kit Use bid as directed CALCIUM 600 + D 600-125 mg-unit Tab Generic drug:  calcium-cholecalciferol (d3) Take  by mouth. cholecalciferol 50,000 unit capsule Commonly known as:  VITAMIN D3 Take 1 Cap by mouth every seven (7) days. Indications: Vitamin D Deficiency  
  
 gabapentin 300 mg capsule Commonly known as:  NEURONTIN Take 1 Cap by mouth two (2) times a day. Indications: NEUROPATHIC PAIN  
  
 glucose blood VI test strips strip Commonly known as:  blood glucose test  
Use bid.  
  
 ketorolac 10 mg tablet Commonly known as:  TORADOL Take 1 Tab by mouth every six (6) hours as needed for Pain. Lancets Misc Use bid.  
  
 metFORMIN 500 mg tablet Commonly known as:  GLUCOPHAGE Take 1 Tab by mouth daily (with breakfast). omeprazole 20 mg capsule Commonly known as:  PRILOSEC  
TAKE ONE CAPSULE BY MOUTH DAILY  DAYS  
  
 traMADol 50 mg tablet Commonly known as:  ULTRAM  
Take 2 Tabs by mouth two (2) times a day. Max Daily Amount: 200 mg. Follow-up Instructions Return in about 3 months (around 9/29/2018). Patient Instructions Body Mass Index: Care Instructions Your Care Instructions Body mass index (BMI) can help you see if your weight is raising your risk for health problems. It uses a formula to compare how much you weigh with how tall you are. · A BMI lower than 18.5 is considered underweight. · A BMI between 18.5 and 24.9 is considered healthy. · A BMI between 25 and 29.9 is considered overweight. A BMI of 30 or higher is considered obese. If your BMI is in the normal range, it means that you have a lower risk for weight-related health problems. If your BMI is in the overweight or obese range, you may be at increased risk for weight-related health problems, such as high blood pressure, heart disease, stroke, arthritis or joint pain, and diabetes. If your BMI is in the underweight range, you may be at increased risk for health problems such as fatigue, lower protection (immunity) against illness, muscle loss, bone loss, hair loss, and hormone problems. BMI is just one measure of your risk for weight-related health problems. You may be at higher risk for health problems if you are not active, you eat an unhealthy diet, or you drink too much alcohol or use tobacco products. Follow-up care is a key part of your treatment and safety. Be sure to make and go to all appointments, and call your doctor if you are having problems. It's also a good idea to know your test results and keep a list of the medicines you take. How can you care for yourself at home? · Practice healthy eating habits. This includes eating plenty of fruits, vegetables, whole grains, lean protein, and low-fat dairy. · If your doctor recommends it, get more exercise. Walking is a good choice. Bit by bit, increase the amount you walk every day. Try for at least 30 minutes on most days of the week. · Do not smoke. Smoking can increase your risk for health problems. If you need help quitting, talk to your doctor about stop-smoking programs and medicines. These can increase your chances of quitting for good. · Limit alcohol to 2 drinks a day for men and 1 drink a day for women.  Too much alcohol can cause health problems. If you have a BMI higher than 25 · Your doctor may do other tests to check your risk for weight-related health problems. This may include measuring the distance around your waist. A waist measurement of more than 40 inches in men or 35 inches in women can increase the risk of weight-related health problems. · Talk with your doctor about steps you can take to stay healthy or improve your health. You may need to make lifestyle changes to lose weight and stay healthy, such as changing your diet and getting regular exercise. If you have a BMI lower than 18.5 · Your doctor may do other tests to check your risk for health problems. · Talk with your doctor about steps you can take to stay healthy or improve your health. You may need to make lifestyle changes to gain or maintain weight and stay healthy, such as getting more healthy foods in your diet and doing exercises to build muscle. Where can you learn more? Go to http://delonte-antonio.info/. Enter S176 in the search box to learn more about \"Body Mass Index: Care Instructions. \" Current as of: October 13, 2016 Content Version: 11.4 © 7962-2179 in2nite. Care instructions adapted under license by [employer] (which disclaims liability or warranty for this information). If you have questions about a medical condition or this instruction, always ask your healthcare professional. [address] any warranty or liability for your use of this information. Learning About Diabetes Food Guidelines Your Care Instructions Meal planning is important to manage diabetes. It helps keep your blood sugar at a target level (which you set with your doctor). You don't have to eat special foods. You can eat what your family eats, including sweets once in a while. But you do have to pay attention to how often you eat and how much you eat of certain foods. You may want to work with a dietitian or a certified diabetes educator (CDE) to help you plan meals and snacks. A dietitian or CDE can also help you lose weight if that is one of your goals. What should you know about eating carbs? Managing the amount of carbohydrate (carbs) you eat is an important part of healthy meals when you have diabetes. Carbohydrate is found in many foods. · Learn which foods have carbs. And learn the amounts of carbs in different foods. ¨ Bread, cereal, pasta, and rice have about 15 grams of carbs in a serving. A serving is 1 slice of bread (1 ounce), ½ cup of cooked cereal, or 1/3 cup of cooked pasta or rice. ¨ Fruits have 15 grams of carbs in a serving. A serving is 1 small fresh fruit, such as an apple or orange; ½ of a banana; ½ cup of cooked or canned fruit; ½ cup of fruit juice; 1 cup of melon or raspberries; or 2 tablespoons of dried fruit. ¨ Milk and no-sugar-added yogurt have 15 grams of carbs in a serving. A serving is 1 cup of milk or 2/3 cup of no-sugar-added yogurt. ¨ Starchy vegetables have 15 grams of carbs in a serving. A serving is ½ cup of mashed potatoes or sweet potato; 1 cup winter squash; ½ of a small baked potato; ½ cup of cooked beans; or ½ cup cooked corn or green peas. · Learn how much carbs to eat each day and at each meal. A dietitian or CDE can teach you how to keep track of the amount of carbs you eat. This is called carbohydrate counting. · If you are not sure how to count carbohydrate grams, use the Plate Method to plan meals. It is a good, quick way to make sure that you have a balanced meal. It also helps you spread carbs throughout the day. ¨ Divide your plate by types of foods. Put non-starchy vegetables on half the plate, meat or other protein food on one-quarter of the plate, and a grain or starchy vegetable in the final quarter of the plate.  To this you can add a small piece of fruit and 1 cup of milk or yogurt, depending on how many carbs you are supposed to eat at a meal. 
· Try to eat about the same amount of carbs at each meal. Do not \"save up\" your daily allowance of carbs to eat at one meal. 
· Proteins have very little or no carbs per serving. Examples of proteins are beef, chicken, turkey, fish, eggs, tofu, cheese, cottage cheese, and peanut butter. A serving size of meat is 3 ounces, which is about the size of a deck of cards. Examples of meat substitute serving sizes (equal to 1 ounce of meat) are 1/4 cup of cottage cheese, 1 egg, 1 tablespoon of peanut butter, and ½ cup of tofu. How can you eat out and still eat healthy? · Learn to estimate the serving sizes of foods that have carbohydrate. If you measure food at home, it will be easier to estimate the amount in a serving of restaurant food. · If the meal you order has too much carbohydrate (such as potatoes, corn, or baked beans), ask to have a low-carbohydrate food instead. Ask for a salad or green vegetables. · If you use insulin, check your blood sugar before and after eating out to help you plan how much to eat in the future. · If you eat more carbohydrate at a meal than you had planned, take a walk or do other exercise. This will help lower your blood sugar. What else should you know? · Limit saturated fat, such as the fat from meat and dairy products. This is a healthy choice because people who have diabetes are at higher risk of heart disease. So choose lean cuts of meat and nonfat or low-fat dairy products. Use olive or canola oil instead of butter or shortening when cooking. · Don't skip meals. Your blood sugar may drop too low if you skip meals and take insulin or certain medicines for diabetes. · Check with your doctor before you drink alcohol. Alcohol can cause your blood sugar to drop too low. Alcohol can also cause a bad reaction if you take certain diabetes medicines. Follow-up care is a key part of your treatment and safety.  Be sure to make and go to all appointments, and call your doctor if you are having problems. It's also a good idea to know your test results and keep a list of the medicines you take. Where can you learn more? Go to http://delonte-antonio.info/. Enter S621 in the search box to learn more about \"Learning About Diabetes Food Guidelines. \" Current as of: March 13, 2017 Content Version: 11.4 © 6610-7716 NetMinder. Care instructions adapted under license by [employer] (which disclaims liability or warranty for this information). If you have questions about a medical condition or this instruction, always ask your healthcare professional. [address] any warranty or liability for your use of this information. Introducing [facility]! Dear Beverly Mora: 
Thank you for requesting a [employer] account. Our records indicate that you already have an active [employer] account. You can access your account anytime at [email or web address] Did you know that you can access your hospital and ER discharge instructions at any time in [employer]? You can also review all of your test results from your hospital stay or ER visit. Additional Information If you have questions, please visit the Frequently Asked Questions section of the [employer] website at [email or web address] Remember, [employer] is NOT to be used for urgent needs. For medical emergencies, dial 911. Now available from your iPhone and Android! Please provide this summary of care documentation to your next provider. Your primary care clinician is listed as [address]. If you have any questions after today's visit, please call [phone].

## 2018-06-29 NOTE — PROGRESS NOTES
Chief Complaint   Patient presents with    Shoulder Pain     right shoulder pain. Will be surgery July 18.  Pre-op Exam     1. Have you been to the ER, urgent care clinic since your last visit? Hospitalized since your last visit? No    2. Have you seen or consulted any other health care providers outside of the [address] [place] [address] since your last visit? Include any pap smears or colon screening.  No

## 2018-06-29 NOTE — PATIENT INSTRUCTIONS
Body Mass Index: Care Instructions  Your Care Instructions    Body mass index (BMI) can help you see if your weight is raising your risk for health problems. It uses a formula to compare how much you weigh with how tall you are. · A BMI lower than 18.5 is considered underweight. · A BMI between 18.5 and 24.9 is considered healthy. · A BMI between 25 and 29.9 is considered overweight. A BMI of 30 or higher is considered obese. If your BMI is in the normal range, it means that you have a lower risk for weight-related health problems. If your BMI is in the overweight or obese range, you may be at increased risk for weight-related health problems, such as high blood pressure, heart disease, stroke, arthritis or joint pain, and diabetes. If your BMI is in the underweight range, you may be at increased risk for health problems such as fatigue, lower protection (immunity) against illness, muscle loss, bone loss, hair loss, and hormone problems. BMI is just one measure of your risk for weight-related health problems. You may be at higher risk for health problems if you are not active, you eat an unhealthy diet, or you drink too much alcohol or use tobacco products. Follow-up care is a key part of your treatment and safety. Be sure to make and go to all appointments, and call your doctor if you are having problems. It's also a good idea to know your test results and keep a list of the medicines you take. How can you care for yourself at home? · Practice healthy eating habits. This includes eating plenty of fruits, vegetables, whole grains, lean protein, and low-fat dairy. · If your doctor recommends it, get more exercise. Walking is a good choice. Bit by bit, increase the amount you walk every day. Try for at least 30 minutes on most days of the week. · Do not smoke. Smoking can increase your risk for health problems. If you need help quitting, talk to your doctor about stop-smoking programs and medicines. These can increase your chances of quitting for good. · Limit alcohol to 2 drinks a day for men and 1 drink a day for women. Too much alcohol can cause health problems. If you have a BMI higher than 25  · Your doctor may do other tests to check your risk for weight-related health problems. This may include measuring the distance around your waist. A waist measurement of more than 40 inches in men or 35 inches in women can increase the risk of weight-related health problems. · Talk with your doctor about steps you can take to stay healthy or improve your health. You may need to make lifestyle changes to lose weight and stay healthy, such as changing your diet and getting regular exercise. If you have a BMI lower than 18.5  · Your doctor may do other tests to check your risk for health problems. · Talk with your doctor about steps you can take to stay healthy or improve your health. You may need to make lifestyle changes to gain or maintain weight and stay healthy, such as getting more healthy foods in your diet and doing exercises to build muscle. Where can you learn more? Go to http://delonte-antonio.info/. Enter S176 in the search box to learn more about \"Body Mass Index: Care Instructions. \"  Current as of: October 13, 2016  Content Version: 11.4  © 8783-3934 Healthwise, Incorporated. Care instructions adapted under license by [employer] (which disclaims liability or warranty for this information). If you have questions about a medical condition or this instruction, always ask your healthcare professional. [address] any warranty or liability for your use of this information. Learning About Diabetes Food Guidelines  Your Care Instructions    Meal planning is important to manage diabetes. It helps keep your blood sugar at a target level (which you set with your doctor). You don't have to eat special foods.  You can eat what your family eats, including sweets once in a while. But you do have to pay attention to how often you eat and how much you eat of certain foods. You may want to work with a dietitian or a certified diabetes educator (CDE) to help you plan meals and snacks. A dietitian or CDE can also help you lose weight if that is one of your goals. What should you know about eating carbs? Managing the amount of carbohydrate (carbs) you eat is an important part of healthy meals when you have diabetes. Carbohydrate is found in many foods. · Learn which foods have carbs. And learn the amounts of carbs in different foods. ¨ Bread, cereal, pasta, and rice have about 15 grams of carbs in a serving. A serving is 1 slice of bread (1 ounce), ½ cup of cooked cereal, or 1/3 cup of cooked pasta or rice. ¨ Fruits have 15 grams of carbs in a serving. A serving is 1 small fresh fruit, such as an apple or orange; ½ of a banana; ½ cup of cooked or canned fruit; ½ cup of fruit juice; 1 cup of melon or raspberries; or 2 tablespoons of dried fruit. ¨ Milk and no-sugar-added yogurt have 15 grams of carbs in a serving. A serving is 1 cup of milk or 2/3 cup of no-sugar-added yogurt. ¨ Starchy vegetables have 15 grams of carbs in a serving. A serving is ½ cup of mashed potatoes or sweet potato; 1 cup winter squash; ½ of a small baked potato; ½ cup of cooked beans; or ½ cup cooked corn or green peas. · Learn how much carbs to eat each day and at each meal. A dietitian or CDE can teach you how to keep track of the amount of carbs you eat. This is called carbohydrate counting. · If you are not sure how to count carbohydrate grams, use the Plate Method to plan meals. It is a good, quick way to make sure that you have a balanced meal. It also helps you spread carbs throughout the day. ¨ Divide your plate by types of foods.  Put non-starchy vegetables on half the plate, meat or other protein food on one-quarter of the plate, and a grain or starchy vegetable in the final quarter of the plate. To this you can add a small piece of fruit and 1 cup of milk or yogurt, depending on how many carbs you are supposed to eat at a meal.  · Try to eat about the same amount of carbs at each meal. Do not \"save up\" your daily allowance of carbs to eat at one meal.  · Proteins have very little or no carbs per serving. Examples of proteins are beef, chicken, turkey, fish, eggs, tofu, cheese, cottage cheese, and peanut butter. A serving size of meat is 3 ounces, which is about the size of a deck of cards. Examples of meat substitute serving sizes (equal to 1 ounce of meat) are 1/4 cup of cottage cheese, 1 egg, 1 tablespoon of peanut butter, and ½ cup of tofu. How can you eat out and still eat healthy? · Learn to estimate the serving sizes of foods that have carbohydrate. If you measure food at home, it will be easier to estimate the amount in a serving of restaurant food. · If the meal you order has too much carbohydrate (such as potatoes, corn, or baked beans), ask to have a low-carbohydrate food instead. Ask for a salad or green vegetables. · If you use insulin, check your blood sugar before and after eating out to help you plan how much to eat in the future. · If you eat more carbohydrate at a meal than you had planned, take a walk or do other exercise. This will help lower your blood sugar. What else should you know? · Limit saturated fat, such as the fat from meat and dairy products. This is a healthy choice because people who have diabetes are at higher risk of heart disease. So choose lean cuts of meat and nonfat or low-fat dairy products. Use olive or canola oil instead of butter or shortening when cooking. · Don't skip meals. Your blood sugar may drop too low if you skip meals and take insulin or certain medicines for diabetes. · Check with your doctor before you drink alcohol. Alcohol can cause your blood sugar to drop too low.  Alcohol can also cause a bad reaction if you take certain diabetes medicines. Follow-up care is a key part of your treatment and safety. Be sure to make and go to all appointments, and call your doctor if you are having problems. It's also a good idea to know your test results and keep a list of the medicines you take. Where can you learn more? Go to http://delonte-antonio.info/. Enter X649 in the search box to learn more about \"Learning About Diabetes Food Guidelines. \"  Current as of: March 13, 2017  Content Version: 11.4  © 5400-4797 Healthwise, Incorporated. Care instructions adapted under license by [employer] (which disclaims liability or warranty for this information). If you have questions about a medical condition or this instruction, always ask your healthcare professional. [address] any warranty or liability for your use of this information.

## 2018-06-29 NOTE — PROGRESS NOTES
Keegan Marcus is a 37 y.o. male  presents for follow up. He also needs pre op clearance for right shoulder. No chest pains or SOB. No weakness or numbness. No Known Allergies  Outpatient Prescriptions Marked as Taking for the 6/29/18 encounter (Office Visit) with Mariana Christina MD   Medication Sig Dispense Refill    calcium-cholecalciferol, d3, (CALCIUM 600 + D) 600-125 mg-unit tab Take  by mouth.  traMADol (ULTRAM) 50 mg tablet Take 2 Tabs by mouth two (2) times a day. Max Daily Amount: 200 mg. 30 Tab 0    cholecalciferol (VITAMIN D3) 50,000 unit capsule Take 1 Cap by mouth every seven (7) days. Indications: Vitamin D Deficiency 8 Cap 0    ketorolac (TORADOL) 10 mg tablet Take 1 Tab by mouth every six (6) hours as needed for Pain. 24 Tab 0    metFORMIN (GLUCOPHAGE) 500 mg tablet Take 1 Tab by mouth daily (with breakfast). 30 Tab 1    gabapentin (NEURONTIN) 300 mg capsule Take 1 Cap by mouth two (2) times a day. Indications: NEUROPATHIC PAIN 60 Cap 2    Blood-Glucose Meter monitoring kit Use bid as directed 1 Kit 0    Lancets misc Use bid. 1 Each 11    omeprazole (PRILOSEC) 20 mg capsule TAKE ONE CAPSULE BY MOUTH DAILY  DAYS 30 Cap 2     Patient Active Problem List   Diagnosis Code    Heartburn R12    Chest pain, central R07.9    Neck pain M54.2    Seasonal allergic rhinitis J30.2    Smoking F17.200    Depression F32.9    Vitamin D deficiency E55.9    Obesity E66.9    Decreased libido R68.82    Recurrent depression (HCC) F33.9    Class 2 obesity due to excess calories without serious comorbidity with body mass index (BMI) of 38.0 to 38.9 in adult E66.09, Z68.38    Hypertension I10    Type 2 diabetes mellitus with hyperglycemia, without long-term current use of insulin (HCC) E11.65    Acute pain of right shoulder M25.511    Severe obesity (BMI 35.0-39. 9) with comorbidity ([facility] [address] E66.01     Past Medical History:   Diagnosis Date    Depression     Hypertension 1/11/2018    Type 2 diabetes mellitus with hyperglycemia, without long-term current use of insulin ([facility] 5/29/2018     Social History     Social History    Marital status:      Spouse name: N/A    Number of children: N/A    Years of education: N/A     Social History Main Topics    Smoking status: Current Every Day Smoker     Packs/day: 1.00     Years: 15.00     Types: Cigarettes    Smokeless tobacco: Former User    Alcohol use No      Comment: socail    Drug use: None    Sexual activity: Not Asked     Other Topics Concern    None     Social History Narrative     No family history on file. Review of Systems   Constitutional: Negative for chills and fever. Cardiovascular: Negative for chest pain and palpitations. Gastrointestinal: Negative for constipation, diarrhea, nausea and vomiting. Musculoskeletal: Positive for joint pain (right shoulder. ). Skin: Negative for itching and rash. Psychiatric/Behavioral: Negative. Vitals:    06/29/18 1615   BP: 128/84   Pulse: 60   Resp: 15   Temp: 98.8 °F (37.1 °C)   SpO2: 98%   Weight: 301 lb (136.5 kg)   Height: 6' 2\" (1.88 m)   PainSc:   4   PainLoc: Shoulder       Physical Exam   Constitutional: He is well-developed, well-nourished, and in no distress. HENT:   Right Ear: External ear normal.   Left Ear: External ear normal.   Nose: Nose normal.   Mouth/Throat: Oropharynx is clear and moist.   Eyes: Conjunctivae are normal. Pupils are equal, round, and reactive to light. Neck: Normal range of motion. Neck supple. Cardiovascular: Normal rate, regular rhythm and normal heart sounds. Pulmonary/Chest: Effort normal and breath sounds normal.   Abdominal: Soft. Bowel sounds are normal.   Musculoskeletal: Normal range of motion. Neurological: He is alert. Gait normal.   Skin: Skin is warm and dry. Psychiatric: Mood, memory, affect and judgment normal.   Nursing note and vitals reviewed. Assessment/Plan      ICD-10-CM ICD-9-CM    1.  Preop examination Z01.818 V72.84    2. Essential hypertension I10 401.9    3. Vitamin D deficiency E55.9 268.9    4. Type 2 diabetes mellitus with hyperglycemia, without long-term current use of insulin (HCC) E11.65 250.00      790.29      Patient is cleared for shoulder surgery. Labs and ekg reviewed. Follow-up Disposition:  Return in about 3 months (around 9/29/2018). lab results and schedule of future lab studies reviewed with patient      Discussed the patient's BMI with him. The BMI follow up plan is as follows:     dietary management education, guidance, and counseling  encourage exercise  monitor weight  prescribed dietary intake    An After Visit Summary was printed and given to the patient.

## 2018-07-16 ENCOUNTER — HOSPITAL ENCOUNTER (OUTPATIENT)
Dept: LAB | Age: 43
Discharge: HOME | End: 2018-07-16
Payer: OTHER GOVERNMENT

## 2018-07-16 DIAGNOSIS — Z01.812 PRE-OPERATIVE LABORATORY EXAMINATION: ICD-10-CM

## 2018-07-16 DIAGNOSIS — M75.111 PARTIAL TEAR OF RIGHT ROTATOR CUFF: ICD-10-CM

## 2018-07-16 DIAGNOSIS — Z01.810 PREOP CARDIOVASCULAR EXAM: ICD-10-CM

## 2018-07-16 LAB
ATRIAL RATE: 64 BPM
BASOPHILS # BLD: 0.1 K/UL (ref 0–0.1)
BASOPHILS NFR BLD: 1 % (ref 0–2)
CALCULATED P AXIS, ECG09: 35 DEGREES
CALCULATED R AXIS, ECG10: 23 DEGREES
CALCULATED T AXIS, ECG11: 47 DEGREES
DIAGNOSIS, 93000: NORMAL
DIFFERENTIAL METHOD BLD: ABNORMAL
EOSINOPHIL # BLD: 0.3 K/UL (ref 0–0.4)
EOSINOPHIL NFR BLD: 3 % (ref 0–5)
ERYTHROCYTE [DISTWIDTH] IN BLOOD BY AUTOMATED COUNT: 13.7 % (ref 11.6–14.5)
EST. AVERAGE GLUCOSE BLD GHB EST-MCNC: 114 MG/DL
HBA1C MFR BLD: 5.6 % (ref 4.2–5.6)
HCT VFR BLD AUTO: 44.8 % (ref 36–48)
HGB BLD-MCNC: 14.9 G/DL (ref 13–16)
LYMPHOCYTES # BLD: 2.4 K/UL (ref 0.9–3.6)
LYMPHOCYTES NFR BLD: 26 % (ref 21–52)
MCH RBC QN AUTO: 29.1 PG (ref 24–34)
MCHC RBC AUTO-ENTMCNC: 33.3 G/DL (ref 31–37)
MCV RBC AUTO: 87.5 FL (ref 74–97)
MONOCYTES # BLD: 0.8 K/UL (ref 0.05–1.2)
MONOCYTES NFR BLD: 9 % (ref 3–10)
NEUTS SEG # BLD: 5.6 K/UL (ref 1.8–8)
NEUTS SEG NFR BLD: 61 % (ref 40–73)
P-R INTERVAL, ECG05: 170 MS
PLATELET # BLD AUTO: 162 K/UL (ref 135–420)
PMV BLD AUTO: 12.4 FL (ref 9.2–11.8)
Q-T INTERVAL, ECG07: 392 MS
QRS DURATION, ECG06: 94 MS
QTC CALCULATION (BEZET), ECG08: 404 MS
RBC # BLD AUTO: 5.12 M/UL (ref 4.7–5.5)
VENTRICULAR RATE, ECG03: 64 BPM
WBC # BLD AUTO: 9.2 K/UL (ref 4.6–13.2)

## 2018-07-16 PROCEDURE — 36415 COLL VENOUS BLD VENIPUNCTURE: CPT | Performed by: ORTHOPAEDIC SURGERY

## 2018-07-16 PROCEDURE — 83036 HEMOGLOBIN GLYCOSYLATED A1C: CPT | Performed by: ORTHOPAEDIC SURGERY

## 2018-07-16 PROCEDURE — 85025 COMPLETE CBC W/AUTO DIFF WBC: CPT | Performed by: ORTHOPAEDIC SURGERY

## 2018-07-16 PROCEDURE — 93005 ELECTROCARDIOGRAM TRACING: CPT

## 2018-07-16 RX ORDER — DICLOFENAC POTASSIUM 50 MG/1
50 TABLET, FILM COATED ORAL 2 TIMES DAILY
COMMUNITY
End: 2018-07-18

## 2018-07-17 ENCOUNTER — ANESTHESIA EVENT (OUTPATIENT)
Dept: SURGERY | Age: 43
End: 2018-07-17
Payer: OTHER GOVERNMENT

## 2018-07-17 NOTE — PERIOP NOTES
Medical Clearance pending pre-op labs and EKG per medical notes. Amada Mcdaniel at Dr. Dorothea Amador office notified that clearance pending.  Amada Mcdaniel to contact PCP office for clearance

## 2018-07-18 ENCOUNTER — ANESTHESIA (OUTPATIENT)
Dept: SURGERY | Age: 43
End: 2018-07-18
Payer: OTHER GOVERNMENT

## 2018-07-18 ENCOUNTER — HOSPITAL ENCOUNTER (OUTPATIENT)
Age: 43
Setting detail: OUTPATIENT SURGERY
Discharge: HOME | End: 2018-07-18
Attending: ORTHOPAEDIC SURGERY | Admitting: ORTHOPAEDIC SURGERY
Payer: OTHER GOVERNMENT

## 2018-07-18 VITALS
HEIGHT: 74 IN | TEMPERATURE: 97.5 F | OXYGEN SATURATION: 94 % | HEART RATE: 72 BPM | RESPIRATION RATE: 14 BRPM | DIASTOLIC BLOOD PRESSURE: 76 MMHG | SYSTOLIC BLOOD PRESSURE: 134 MMHG | WEIGHT: 301.2 LBS | BODY MASS INDEX: 38.65 KG/M2

## 2018-07-18 DIAGNOSIS — M25.511 ACUTE PAIN OF RIGHT SHOULDER: Primary | ICD-10-CM

## 2018-07-18 LAB
AMPHET UR QL SCN: NEGATIVE
BARBITURATES UR QL SCN: NEGATIVE
BENZODIAZ UR QL: NEGATIVE
CANNABINOIDS UR QL SCN: POSITIVE
COCAINE UR QL SCN: NEGATIVE
GLUCOSE BLD STRIP.AUTO-MCNC: 126 MG/DL (ref 70–110)
GLUCOSE BLD STRIP.AUTO-MCNC: 86 MG/DL (ref 70–110)
HDSCOM,HDSCOM: ABNORMAL
METHADONE UR QL: NEGATIVE
OPIATES UR QL: NEGATIVE
PCP UR QL: NEGATIVE

## 2018-07-18 PROCEDURE — 77030008683 HC TU ET CUF COVD -A: Performed by: ANESTHESIOLOGY

## 2018-07-18 PROCEDURE — 77030006891 HC BLD SHV RESECT STRY -B: Performed by: ORTHOPAEDIC SURGERY

## 2018-07-18 PROCEDURE — 77030019605: Performed by: ORTHOPAEDIC SURGERY

## 2018-07-18 PROCEDURE — C1713 ANCHOR/SCREW BN/BN,TIS/BN: HCPCS | Performed by: ORTHOPAEDIC SURGERY

## 2018-07-18 PROCEDURE — 77030008574 HC TBNG SUC IRR STRY -B: Performed by: ORTHOPAEDIC SURGERY

## 2018-07-18 PROCEDURE — 74011250636 HC RX REV CODE- 250/636

## 2018-07-18 PROCEDURE — 74011250636 HC RX REV CODE- 250/636: Performed by: NURSE ANESTHETIST, CERTIFIED REGISTERED

## 2018-07-18 PROCEDURE — 74011000272 HC RX REV CODE- 272: Performed by: ORTHOPAEDIC SURGERY

## 2018-07-18 PROCEDURE — 77030032490 HC SLV COMPR SCD KNE COVD -B: Performed by: ORTHOPAEDIC SURGERY

## 2018-07-18 PROCEDURE — 76060000036 HC ANESTHESIA 2.5 TO 3 HR: Performed by: ORTHOPAEDIC SURGERY

## 2018-07-18 PROCEDURE — 77030015366 HC IMMOB SHLDR BREG -B: Performed by: ORTHOPAEDIC SURGERY

## 2018-07-18 PROCEDURE — 76942 ECHO GUIDE FOR BIOPSY: CPT | Performed by: ANESTHESIOLOGY

## 2018-07-18 PROCEDURE — 77030002916 HC SUT ETHLN J&J -A: Performed by: ORTHOPAEDIC SURGERY

## 2018-07-18 PROCEDURE — 77030020782 HC GWN BAIR PAWS FLX 3M -B: Performed by: ORTHOPAEDIC SURGERY

## 2018-07-18 PROCEDURE — 77030010427: Performed by: ORTHOPAEDIC SURGERY

## 2018-07-18 PROCEDURE — 76010000132 HC OR TIME 2.5 TO 3 HR: Performed by: ORTHOPAEDIC SURGERY

## 2018-07-18 PROCEDURE — 76210000021 HC REC RM PH II 0.5 TO 1 HR: Performed by: ORTHOPAEDIC SURGERY

## 2018-07-18 PROCEDURE — 74011000250 HC RX REV CODE- 250

## 2018-07-18 PROCEDURE — 76210000000 HC OR PH I REC 2 TO 2.5 HR: Performed by: ORTHOPAEDIC SURGERY

## 2018-07-18 PROCEDURE — 77030003666 HC NDL SPINAL BD -A: Performed by: ORTHOPAEDIC SURGERY

## 2018-07-18 PROCEDURE — 77030003601 HC NDL NRV BLK BBMI -A: Performed by: ORTHOPAEDIC SURGERY

## 2018-07-18 PROCEDURE — 74011000250 HC RX REV CODE- 250: Performed by: NURSE ANESTHETIST, CERTIFIED REGISTERED

## 2018-07-18 PROCEDURE — 77030022036 HC BLD SHV TOMCAT STRY -B: Performed by: ORTHOPAEDIC SURGERY

## 2018-07-18 PROCEDURE — 77030017964 HC PRB COAG4 STRY -C: Performed by: ORTHOPAEDIC SURGERY

## 2018-07-18 PROCEDURE — 77030018836 HC SOL IRR NACL ICUM -A: Performed by: ORTHOPAEDIC SURGERY

## 2018-07-18 PROCEDURE — 64415 NJX AA&/STRD BRCH PLXS IMG: CPT | Performed by: ANESTHESIOLOGY

## 2018-07-18 PROCEDURE — 77030004453 HC BUR SHV STRY -B: Performed by: ORTHOPAEDIC SURGERY

## 2018-07-18 PROCEDURE — 74011250637 HC RX REV CODE- 250/637: Performed by: NURSE ANESTHETIST, CERTIFIED REGISTERED

## 2018-07-18 PROCEDURE — 82962 GLUCOSE BLOOD TEST: CPT

## 2018-07-18 PROCEDURE — 77030037004 HC RMR PILOT HD DISP ARTH -C: Performed by: ORTHOPAEDIC SURGERY

## 2018-07-18 PROCEDURE — 80307 DRUG TEST PRSMV CHEM ANLYZR: CPT | Performed by: NURSE ANESTHETIST, CERTIFIED REGISTERED

## 2018-07-18 PROCEDURE — 74011250636 HC RX REV CODE- 250/636: Performed by: ORTHOPAEDIC SURGERY

## 2018-07-18 PROCEDURE — 77030037837: Performed by: ORTHOPAEDIC SURGERY

## 2018-07-18 PROCEDURE — 77030003598 HC NDL MULT/FIRE ARTH -C: Performed by: ORTHOPAEDIC SURGERY

## 2018-07-18 PROCEDURE — 77030013789 HC KT REP BIO TEND ARTH -C: Performed by: ORTHOPAEDIC SURGERY

## 2018-07-18 PROCEDURE — 77030013079 HC BLNKT BAIR HGGR 3M -A: Performed by: ANESTHESIOLOGY

## 2018-07-18 DEVICE — ANCHOR SUT L19.1MM DIA8MM BIOCOMP SWIVELOCK TENODESIS: Type: IMPLANTABLE DEVICE | Site: SHOULDER | Status: FUNCTIONAL

## 2018-07-18 DEVICE — ANCHOR SUT L14.7MM DIA5.5MM 2 NO2 TIGERTAIL FULL THRD: Type: IMPLANTABLE DEVICE | Site: SHOULDER | Status: FUNCTIONAL

## 2018-07-18 DEVICE — ANCHOR SUTURE BIOCOMP 4.75X19.1 MM SWIVELOCK C: Type: IMPLANTABLE DEVICE | Site: SHOULDER | Status: FUNCTIONAL

## 2018-07-18 RX ORDER — SODIUM CHLORIDE, SODIUM LACTATE, POTASSIUM CHLORIDE, CALCIUM CHLORIDE 600; 310; 30; 20 MG/100ML; MG/100ML; MG/100ML; MG/100ML
75 INJECTION, SOLUTION INTRAVENOUS CONTINUOUS
Status: DISCONTINUED | OUTPATIENT
Start: 2018-07-18 | End: 2018-07-18 | Stop reason: HOSPADM

## 2018-07-18 RX ORDER — PROPOFOL 10 MG/ML
INJECTION, EMULSION INTRAVENOUS AS NEEDED
Status: DISCONTINUED | OUTPATIENT
Start: 2018-07-18 | End: 2018-07-18 | Stop reason: HOSPADM

## 2018-07-18 RX ORDER — DEXTROSE 50 % IN WATER (D50W) INTRAVENOUS SYRINGE
25-50 AS NEEDED
Status: DISCONTINUED | OUTPATIENT
Start: 2018-07-18 | End: 2018-07-18 | Stop reason: HOSPADM

## 2018-07-18 RX ORDER — ONDANSETRON 2 MG/ML
INJECTION INTRAMUSCULAR; INTRAVENOUS AS NEEDED
Status: DISCONTINUED | OUTPATIENT
Start: 2018-07-18 | End: 2018-07-18 | Stop reason: HOSPADM

## 2018-07-18 RX ORDER — LIDOCAINE HYDROCHLORIDE 20 MG/ML
INJECTION, SOLUTION EPIDURAL; INFILTRATION; INTRACAUDAL; PERINEURAL AS NEEDED
Status: DISCONTINUED | OUTPATIENT
Start: 2018-07-18 | End: 2018-07-18 | Stop reason: HOSPADM

## 2018-07-18 RX ORDER — SODIUM CHLORIDE 0.9 % (FLUSH) 0.9 %
5-10 SYRINGE (ML) INJECTION AS NEEDED
Status: DISCONTINUED | OUTPATIENT
Start: 2018-07-18 | End: 2018-07-18 | Stop reason: HOSPADM

## 2018-07-18 RX ORDER — CEFAZOLIN SODIUM IN 0.9 % NACL 2 G/100 ML
PLASTIC BAG, INJECTION (ML) INTRAVENOUS AS NEEDED
Status: DISCONTINUED | OUTPATIENT
Start: 2018-07-18 | End: 2018-07-18 | Stop reason: HOSPADM

## 2018-07-18 RX ORDER — MIDAZOLAM HYDROCHLORIDE 1 MG/ML
2 INJECTION, SOLUTION INTRAMUSCULAR; INTRAVENOUS ONCE
Status: COMPLETED | OUTPATIENT
Start: 2018-07-18 | End: 2018-07-18

## 2018-07-18 RX ORDER — FENTANYL CITRATE 50 UG/ML
INJECTION, SOLUTION INTRAMUSCULAR; INTRAVENOUS AS NEEDED
Status: DISCONTINUED | OUTPATIENT
Start: 2018-07-18 | End: 2018-07-18 | Stop reason: HOSPADM

## 2018-07-18 RX ORDER — EPHEDRINE SULFATE 50 MG/ML
INJECTION, SOLUTION INTRAVENOUS AS NEEDED
Status: DISCONTINUED | OUTPATIENT
Start: 2018-07-18 | End: 2018-07-18 | Stop reason: HOSPADM

## 2018-07-18 RX ORDER — ROPIVACAINE HYDROCHLORIDE 5 MG/ML
30 INJECTION, SOLUTION EPIDURAL; INFILTRATION; PERINEURAL
Status: COMPLETED | OUTPATIENT
Start: 2018-07-18 | End: 2018-07-18

## 2018-07-18 RX ORDER — FENTANYL CITRATE 50 UG/ML
100 INJECTION, SOLUTION INTRAMUSCULAR; INTRAVENOUS ONCE
Status: COMPLETED | OUTPATIENT
Start: 2018-07-18 | End: 2018-07-18

## 2018-07-18 RX ORDER — OXYCODONE AND ACETAMINOPHEN 5; 325 MG/1; MG/1
1-2 TABLET ORAL
Qty: 60 TAB | Refills: 0 | Status: SHIPPED | OUTPATIENT
Start: 2018-07-18 | End: 2018-07-27 | Stop reason: SDUPTHER

## 2018-07-18 RX ORDER — LIDOCAINE HYDROCHLORIDE 10 MG/ML
0.1 INJECTION, SOLUTION EPIDURAL; INFILTRATION; INTRACAUDAL; PERINEURAL AS NEEDED
Status: DISCONTINUED | OUTPATIENT
Start: 2018-07-18 | End: 2018-07-18 | Stop reason: HOSPADM

## 2018-07-18 RX ORDER — INSULIN LISPRO 100 [IU]/ML
INJECTION, SOLUTION INTRAVENOUS; SUBCUTANEOUS ONCE
Status: DISCONTINUED | OUTPATIENT
Start: 2018-07-18 | End: 2018-07-18 | Stop reason: HOSPADM

## 2018-07-18 RX ORDER — PROMETHAZINE HYDROCHLORIDE 25 MG/ML
INJECTION, SOLUTION INTRAMUSCULAR; INTRAVENOUS
Status: COMPLETED
Start: 2018-07-18 | End: 2018-07-18

## 2018-07-18 RX ORDER — FENTANYL CITRATE 50 UG/ML
25 INJECTION, SOLUTION INTRAMUSCULAR; INTRAVENOUS AS NEEDED
Status: DISCONTINUED | OUTPATIENT
Start: 2018-07-18 | End: 2018-07-18 | Stop reason: HOSPADM

## 2018-07-18 RX ORDER — ONDANSETRON 2 MG/ML
INJECTION INTRAMUSCULAR; INTRAVENOUS
Status: COMPLETED
Start: 2018-07-18 | End: 2018-07-18

## 2018-07-18 RX ORDER — PROMETHAZINE HYDROCHLORIDE 25 MG/ML
12.5 INJECTION, SOLUTION INTRAMUSCULAR; INTRAVENOUS
Status: COMPLETED | OUTPATIENT
Start: 2018-07-18 | End: 2018-07-18

## 2018-07-18 RX ORDER — HYDROMORPHONE HYDROCHLORIDE 2 MG/ML
0.2 INJECTION, SOLUTION INTRAMUSCULAR; INTRAVENOUS; SUBCUTANEOUS
Status: DISCONTINUED | OUTPATIENT
Start: 2018-07-18 | End: 2018-07-18 | Stop reason: HOSPADM

## 2018-07-18 RX ORDER — SUCCINYLCHOLINE CHLORIDE 20 MG/ML
INJECTION INTRAMUSCULAR; INTRAVENOUS AS NEEDED
Status: DISCONTINUED | OUTPATIENT
Start: 2018-07-18 | End: 2018-07-18 | Stop reason: HOSPADM

## 2018-07-18 RX ORDER — SODIUM CHLORIDE 0.9 % (FLUSH) 0.9 %
5-10 SYRINGE (ML) INJECTION EVERY 8 HOURS
Status: DISCONTINUED | OUTPATIENT
Start: 2018-07-18 | End: 2018-07-18 | Stop reason: HOSPADM

## 2018-07-18 RX ORDER — FAMOTIDINE 20 MG/1
20 TABLET, FILM COATED ORAL ONCE
Status: COMPLETED | OUTPATIENT
Start: 2018-07-18 | End: 2018-07-18

## 2018-07-18 RX ORDER — MAGNESIUM SULFATE 100 %
4 CRYSTALS MISCELLANEOUS AS NEEDED
Status: DISCONTINUED | OUTPATIENT
Start: 2018-07-18 | End: 2018-07-18 | Stop reason: HOSPADM

## 2018-07-18 RX ORDER — SODIUM CHLORIDE, SODIUM LACTATE, POTASSIUM CHLORIDE, CALCIUM CHLORIDE 600; 310; 30; 20 MG/100ML; MG/100ML; MG/100ML; MG/100ML
INJECTION, SOLUTION INTRAVENOUS
Status: DISCONTINUED | OUTPATIENT
Start: 2018-07-18 | End: 2018-07-18 | Stop reason: HOSPADM

## 2018-07-18 RX ORDER — ONDANSETRON 2 MG/ML
4 INJECTION INTRAMUSCULAR; INTRAVENOUS ONCE
Status: COMPLETED | OUTPATIENT
Start: 2018-07-18 | End: 2018-07-18

## 2018-07-18 RX ADMIN — ONDANSETRON 4 MG: 2 INJECTION INTRAMUSCULAR; INTRAVENOUS at 15:20

## 2018-07-18 RX ADMIN — FENTANYL CITRATE 50 MCG: 50 INJECTION, SOLUTION INTRAMUSCULAR; INTRAVENOUS at 14:42

## 2018-07-18 RX ADMIN — LIDOCAINE HYDROCHLORIDE 100 MG: 20 INJECTION, SOLUTION EPIDURAL; INFILTRATION; INTRACAUDAL; PERINEURAL at 13:14

## 2018-07-18 RX ADMIN — FENTANYL CITRATE 100 MCG: 50 INJECTION, SOLUTION INTRAMUSCULAR; INTRAVENOUS at 15:30

## 2018-07-18 RX ADMIN — ROPIVACAINE HYDROCHLORIDE 150 MG: 5 INJECTION, SOLUTION EPIDURAL; INFILTRATION; PERINEURAL at 11:50

## 2018-07-18 RX ADMIN — PROMETHAZINE HYDROCHLORIDE 12.5 MG: 25 INJECTION, SOLUTION INTRAMUSCULAR; INTRAVENOUS at 17:49

## 2018-07-18 RX ADMIN — PROPOFOL 200 MG: 10 INJECTION, EMULSION INTRAVENOUS at 13:14

## 2018-07-18 RX ADMIN — FAMOTIDINE 20 MG: 20 TABLET, FILM COATED ORAL at 10:50

## 2018-07-18 RX ADMIN — SUCCINYLCHOLINE CHLORIDE 180 MG: 20 INJECTION INTRAMUSCULAR; INTRAVENOUS at 13:14

## 2018-07-18 RX ADMIN — PROMETHAZINE HYDROCHLORIDE 12.5 MG: 25 INJECTION INTRAMUSCULAR; INTRAVENOUS at 17:49

## 2018-07-18 RX ADMIN — FENTANYL CITRATE 100 MCG: 50 INJECTION INTRAMUSCULAR; INTRAVENOUS at 11:45

## 2018-07-18 RX ADMIN — ONDANSETRON 4 MG: 2 INJECTION INTRAMUSCULAR; INTRAVENOUS at 17:10

## 2018-07-18 RX ADMIN — FENTANYL CITRATE 50 MCG: 50 INJECTION, SOLUTION INTRAMUSCULAR; INTRAVENOUS at 13:14

## 2018-07-18 RX ADMIN — FENTANYL CITRATE 50 MCG: 50 INJECTION, SOLUTION INTRAMUSCULAR; INTRAVENOUS at 15:00

## 2018-07-18 RX ADMIN — Medication 2 G: at 13:30

## 2018-07-18 RX ADMIN — SODIUM CHLORIDE, SODIUM LACTATE, POTASSIUM CHLORIDE, CALCIUM CHLORIDE: 600; 310; 30; 20 INJECTION, SOLUTION INTRAVENOUS at 13:05

## 2018-07-18 RX ADMIN — LIDOCAINE HYDROCHLORIDE 0.1 ML: 10 INJECTION, SOLUTION EPIDURAL; INFILTRATION; INTRACAUDAL; PERINEURAL at 10:46

## 2018-07-18 RX ADMIN — MIDAZOLAM HYDROCHLORIDE 2 MG: 1 INJECTION, SOLUTION INTRAMUSCULAR; INTRAVENOUS at 11:45

## 2018-07-18 RX ADMIN — EPHEDRINE SULFATE 5 MG: 50 INJECTION, SOLUTION INTRAVENOUS at 14:11

## 2018-07-18 RX ADMIN — SODIUM CHLORIDE, SODIUM LACTATE, POTASSIUM CHLORIDE, AND CALCIUM CHLORIDE 75 ML/HR: 600; 310; 30; 20 INJECTION, SOLUTION INTRAVENOUS at 10:50

## 2018-07-18 RX ADMIN — FENTANYL CITRATE 50 MCG: 50 INJECTION, SOLUTION INTRAMUSCULAR; INTRAVENOUS at 13:28

## 2018-07-18 NOTE — PERIOP NOTES
Patient discharged from facility via wheelchair. Patient's spouse has discharge instructions and prescription in hand. Patient armband removed and shredded. Patient given Dr. Chowdhury Hinders discharge instructions as well.

## 2018-07-18 NOTE — ANESTHESIA PROCEDURE NOTES
Peripheral Block    Start time: 7/18/2018 11:43 AM  End time: 7/18/2018 11:53 AM  Performed by: Kaitlin Cavanaugh  Authorized by: Kaitlin Cavanaugh       Pre-procedure: Indications: at surgeon's request and post-op pain management    Preanesthetic Checklist: patient identified, risks and benefits discussed, site marked, timeout performed, anesthesia consent given and patient being monitored      Block Type:   Block Type:  Brachial plexus and interscalene  Laterality:  Right  Monitoring:  Standard ASA monitoring, continuous pulse ox, responsive to questions, oxygen, frequent vital sign checks and heart rate  Injection Technique:  Single shot  Procedures: ultrasound guided    Patient Position: seated  Prep: chlorhexidine    Location:  Interscalene  Needle Type:  Ultraplex  Needle Gauge:  22 G  Needle Localization:  Ultrasound guidance and nerve stimulator  Motor Response: minimal motor response >0.4 mA    Medication Injected:  0.5%  ropivacaine  Volume (mL):  30    Assessment:  Number of attempts:  1  Injection Assessment:  Incremental injection every 5 mL, negative aspiration for blood, local visualized surrounding nerve on ultrasound, no paresthesia, ultrasound image on chart and no intravascular symptoms  Patient tolerance:  Patient tolerated the procedure well with no immediate complications  For Vitals see nursing notes.      Maggie Ye MD  11:53 AM

## 2018-07-18 NOTE — INTERVAL H&P NOTE
H&P Update:  Victorina Jaimes was seen and examined. History and physical has been reviewed. The patient has been examined.  There have been no significant clinical changes since the completion of the originally dated History and Physical.    Signed By: Raghu Mayo MD     July 18, 2018 12:46 PM

## 2018-07-18 NOTE — H&P (VIEW-ONLY)
Patient: Adams Serrano                MRN: [id number]       SSN: [id number] YOB: 1975        AGE: 37 y.o. SEX: male Body mass index is 38.62 kg/(m^2). PCP: Dc Velasquez MD 
06/20/18 Chief Complaint: Right shoulder pain HISTORY OF PRESENT ILLNESS:  dAams Serrano is a 37 y.o. male who comes into the office today for his right shoulder. He has been dealing with right shoulder pain for several months. He says while at work pushing himself up with his right arm, he felt a pop and since then he has had pain. The pain occasionally radiates down his arm to his elbow and occasionally to his hand. He has limited motion due to the pain. He saw Dr. Marta Segura. He had an MRI done of his shoulder. He has continued to have pain. He has not had any formal physical therapy. His main complaint is pain as well as some weakness. He is on limited duty at work due to his right shoulder. He is here today to discuss treatment options. Past Medical History:  
Diagnosis Date  Depression  Hypertension 1/11/2018  Type 2 diabetes mellitus with hyperglycemia, without long-term current use of insulin ([facility] 5/29/2018 History reviewed. No pertinent family history. Current Outpatient Prescriptions Medication Sig Dispense Refill  calcium-cholecalciferol, d3, (CALCIUM 600 + D) 600-125 mg-unit tab Take  by mouth.  traMADol (ULTRAM) 50 mg tablet Take 2 Tabs by mouth two (2) times a day. Max Daily Amount: 200 mg. 30 Tab 0  cholecalciferol (VITAMIN D3) 50,000 unit capsule Take 1 Cap by mouth every seven (7) days. Indications: Vitamin D Deficiency 8 Cap 0  
 ketorolac (TORADOL) 10 mg tablet Take 1 Tab by mouth every six (6) hours as needed for Pain. 24 Tab 0  
 metFORMIN (GLUCOPHAGE) 500 mg tablet Take 1 Tab by mouth daily (with breakfast). 30 Tab 1  
 gabapentin (NEURONTIN) 300 mg capsule Take 1 Cap by mouth two (2) times a day.  Indications: NEUROPATHIC PAIN 60 Cap 2  Blood-Glucose Meter monitoring kit Use bid as directed 1 Kit 0  
 Lancets misc Use bid. 1 Each 11  
 glucose blood VI test strips (BLOOD GLUCOSE TEST) strip Use bid. 100 Strip 2  
 omeprazole (PRILOSEC) 20 mg capsule TAKE ONE CAPSULE BY MOUTH DAILY  DAYS 30 Cap 2 No Known Allergies History reviewed. No pertinent surgical history. Social History Social History  Marital status:  Spouse name: N/A  
 Number of children: N/A  
 Years of education: N/A Occupational History  Not on file. Social History Main Topics  Smoking status: Current Every Day Smoker Packs/day: 1.00 Years: 15.00 Types: Cigarettes  Smokeless tobacco: Former User  Alcohol use No  
   Comment: socail  Drug use: Not on file  Sexual activity: Not on file Other Topics Concern  Not on file Social History Narrative REVIEW OF SYSTEMS:   
 
CON: negative for recent weight loss/gain, fever, or chills EYE: negative for double or blurry vision ENT: negative for hoarseness RS:   negative for cough, URI, SOB 
CV:  negative for chest pain, palpitations GI:    negative for blood in stool, nausea/vomiting :  negative for blood in urine MS: As per HPI Other systems reviewed and noted below. PHYSICAL EXAMINATION: 
Visit Vitals  /81 (BP 1 Location: Left arm, BP Patient Position: Sitting)  Pulse 68  Resp 20  
 Ht 6' 2\" (1.88 m)  Wt 300 lb 12.8 oz (136.4 kg)  SpO2 97%  BMI 38.62 kg/m2 Body mass index is 38.62 kg/(m^2). GENERAL: Alert and oriented x3, in no acute distress, well-developed, well-nourished. HEENT: Normocephalic, atraumatic. RESP: Non labored breathing with equal chest rise on inspiration. CV: Well perfused extremities. No cyanosis or clubbing noted. ABDOMEN: Soft, non-tender, non-distended.   
PHYSICAL EXAMINATION:  Physical examination of the right shoulder reveals forward flexion to 160 degrees, external rotation of 30 degrees and internal rotation to the posterolateral buttock. He has pain at terminal endpoints of his motion. He has pain with resisted supraspinatus and infraspinatus testing. Minimal pain with subscapularis testing. He has pain with resisted biceps strength testing, tender to palpation over the [facility] joint. Pain with impingement testing. He is neurovascularly intact distally. IMAGING:  An MRI of his right shoulder was reviewed in the office today. This revealed a high grade, partial thickness supraspinatus tear as well as tendinosis of the supraspinatus and infraspinatus and subscapularis. There is a significant amount of fluid around the biceps tendon. ASSESSMENT/PLAN:  Michael Haddad is a 37 y.o. male with right shoulder pain and high grade, partial thickness rotator cuff tear as well as biceps tendonitis. I discussed with him the treatment options at length together. We discussed physical therapy as well as surgery. Surgery would be an arthroscopic debridement as well as possible rotator cuff repair and treatment of his biceps pathology as well as his AC joint. He would like to proceed with surgery. Therefore, we will get him set up.    
 
 
 
Electronically signed by: Danika Delaney MD

## 2018-07-18 NOTE — DISCHARGE INSTRUCTIONS
Rotator Cuff Repair: What to Expect at [address] cuff repair surgery is done to fix a tear in the rotator cuff. It can also include cleaning the space between the rotator cuff tendons and the shoulder blade. This is called subacromial smoothing. You will feel tired for several days. Your shoulder will be swollen, and you may notice that your skin is a different color near the cut (incision). Your hand and arm may also be swollen. This is normal and will start to get better in a few days. It will be several months before you have complete use of your shoulder and arm. Once you have healed from surgery, you will need to build your strength and the motion of your joint with rehabilitation (rehab) exercises. In time, your shoulder will likely be stronger, less painful, and more flexible than it was before the surgery. This care sheet gives you a general idea about how long it will take for you to recover. But each person recovers at a different pace. Follow the steps below to get better as quickly as possible. How can you care for yourself at home? Activity    · Rest when you feel tired. Getting enough sleep will help you recover. Do not lie flat or sleep on your side. Raise your upper body on two or three pillows, or sleep in a reclining chair.     · Try to walk each day. Start by walking a little more than you did the day before. Bit by bit, increase the amount you walk. Walking boosts blood flow and helps prevent pneumonia and constipation.     · Your arm will be in a sling or other device to prevent it from moving for 4 to 8 weeks. ¨ Always use the sling when you are walking or standing. ¨ If you are sitting or lying down, you can loosen the sling, but do not remove it. This lets your elbow straighten without moving the shoulder. You can also support your arm on a pillow.   ¨ Remove the sling only to do prescribed exercises or to shower.     · You will not have complete use of your affected arm for 3 to 4 months after surgery. ¨ You can use your affected arm for writing, eating, or drinking, but move it only at the elbow or wrist. Do not use it for anything else except prescribed exercises until the sling has been removed. ¨ When the sling has been removed, you can do activities that do not involve lifting, pushing, pulling, or carrying. You may not be able to do overhead lifting for 6 to 12 months.     · If you have a desk job, you will probably be able to return to work or your normal routine in 1 to 2 weeks. If you have a more active job, you may be away from work for 3 to 4 months or longer. If you work at a job that involves heavy manual labor, lifting your arms above your head, or the use of heavy tools, you may have to think about making changes to your job.     · If the rotator cuff repair was done arthroscopically, you can take a shower 48 to 72 hours after surgery. Remove the sling, and leave your arm by your side. To wash under your armpit, lean over and let the arm fall away from your body. Do not raise your arm. You may want to use a shower stool for a day or two.     · If you had open surgery, do not shower until you see your doctor and he or she okays it. You can wash the incisions with regular soap and water.     · Ask your doctor when you can drive again. This may take about 6 weeks or until you are no longer wearing the sling. Diet    · You can eat your normal diet. If your stomach is upset, try bland, low-fat foods like plain rice, broiled chicken, toast, and yogurt. Drink plenty of fluids.     · You may notice that your bowel movements are not regular right after your surgery. This is common. Try to avoid constipation and straining with bowel movements. You may want to take a fiber supplement every day. If you have not had a bowel movement after a couple of days, ask your doctor about taking a mild laxative.    Medicines    · Your doctor will tell you if and when you can restart your medicines. He or she will also give you instructions about taking any new medicines.     · If you take blood thinners, such as warfarin (Coumadin), clopidogrel (Plavix), or aspirin, be sure to talk to your doctor. He or she will tell you if and when to start taking those medicines again. Make sure that you understand exactly what your doctor wants you to do.     · Take pain medicines exactly as directed. ¨ If the doctor gave you a prescription medicine for pain, take it as prescribed. Use pain medicine when you first notice pain, before it becomes severe. It is easier to prevent pain early than to stop it once it gets bad. ¨ If you are not taking a prescription pain medicine, ask your doctor if you can take an over-the-counter medicine.     · If your doctor prescribed antibiotics, take them as directed. Do not stop taking them just because you feel better. You need to take the full course of antibiotics.     · If you think your pain medicine is making you sick to your stomach:  ¨ Take your medicine after meals (unless your doctor has told you not to). ¨ Ask your doctor for a different pain medicine. Incision care    · If you had arthroscopic surgery, you may remove the bandage over your cut (incision) 24 to 48 hours after the surgery. Keep the bandage clean and dry.     · If you had open surgery, do not remove your bandage until you see your doctor and he or she okays it. Keep the bandage clean and dry.     · If your incision is open to the air, keep the area clean and dry.     · If you have strips of tape on the incision, leave the tape on for a week or until it falls off. Exercise    · Shoulder rehabilitation is a series of exercises you do after your surgery. This helps you get back your shoulder's range of motion and strength. You will work with your doctor and physical therapist to plan this exercise program. Rehab may not start until 6 weeks after the surgery.  To get the best results, you need to do the exercises correctly and as often and for as long as your doctor tells you.    Ice    · To reduce swelling and pain, put ice or a cold pack on your shoulder for 10 to 20 minutes at a time. Do this every 1 to 2 hours. Put a thin cloth between the ice and your skin. Follow-up care is a key part of your treatment and safety. Be sure to make and go to all appointments, and call your doctor if you are having problems. It's also a good idea to know your test results and keep a list of the medicines you take. When should you call for help? Call 911 anytime you think you may need emergency care. For example, call if:    · You passed out (lost consciousness).     · You have severe trouble breathing.     · You have sudden chest pain and shortness of breath, or you cough up blood.    Call your doctor now or seek immediate medical care if:    · You have numbness, tingling, or a bluish color in your fingers or hand.     · You have severe nausea or vomiting.     · You have pain that does not go away after you take pain medicine.     · You have loose stitches, or your incision comes open.     · Your incision bleeds through your first bandage or is still bleeding 3 days after your surgery.     · You have signs of infection, such as:  ¨ Increased pain, swelling, warmth, or redness. ¨ Red streaks leading from the incision. ¨ Pus draining from the incision. ¨ A fever.    Watch closely for any changes in your health, and be sure to contact your doctor if:    · You do not have a bowel movement after taking a laxative. Where can you learn more? Go to http://delonte-antonio.info/. Enter E817 in the search box to learn more about \"Rotator Cuff Repair: What to Expect at Home. \"  Current as of: November 29, 2017  Content Version: 11.7  © 0661-2484 [employer]. Care instructions adapted under license by [employer] (which disclaims liability or warranty for this information).  If you have questions about a medical condition or this instruction, always ask your healthcare professional. [address] any warranty or liability for your use of this information. DISCHARGE SUMMARY from Nurse    PATIENT INSTRUCTIONS:    After general anesthesia or intravenous sedation, for 24 hours or while taking prescription Narcotics:  · Limit your activities  · Do not drive and operate hazardous machinery  · Do not make important personal or business decisions  · Do  not drink alcoholic beverages  · If you have not urinated within 8 hours after discharge, please contact your surgeon on call. Report the following to your surgeon:  · Excessive pain, swelling, redness or odor of or around the surgical area  · Temperature over 100.5  · Nausea and vomiting lasting longer than 4 hours or if unable to take medications  · Any signs of decreased circulation or nerve impairment to extremity: change in color, persistent  numbness, tingling, coldness or increase pain  · Any questions    What to do at Home:  Recommended activity: Activity as tolerated and no driving for today and No driving while on analgesics. *  Please give a list of your current medications to your Primary Care Provider. *  Please update this list whenever your medications are discontinued, doses are      changed, or new medications (including over-the-counter products) are added. *  Please carry medication information at all times in case of emergency situations. These are general instructions for a healthy lifestyle:    No smoking/ No tobacco products/ Avoid exposure to second hand smoke  Surgeon General's Warning:  Quitting smoking now greatly reduces serious risk to your health.     Obesity, smoking, and sedentary lifestyle greatly increases your risk for illness    A healthy diet, regular physical exercise & weight monitoring are important for maintaining a healthy lifestyle    You may be retaining fluid if you have a history of heart failure or if you experience any of the following symptoms:  Weight gain of 3 pounds or more overnight or 5 pounds in a week, increased swelling in our hands or feet or shortness of breath while lying flat in bed. Please call your doctor as soon as you notice any of these symptoms; do not wait until your next office visit. Recognize signs and symptoms of STROKE:    F-face looks uneven    A-arms unable to move or move unevenly    S-speech slurred or non-existent    T-time-call 911 as soon as signs and symptoms begin-DO NOT go       Back to bed or wait to see if you get better-TIME IS BRAIN. Warning Signs of HEART ATTACK     Call 911 if you have these symptoms:   Chest discomfort. Most heart attacks involve discomfort in the center of the chest that lasts more than a few minutes, or that goes away and comes back. It can feel like uncomfortable pressure, squeezing, fullness, or pain.  Discomfort in other areas of the upper body. Symptoms can include pain or discomfort in one or both arms, the back, neck, jaw, or stomach.  Shortness of breath with or without chest discomfort.  Other signs may include breaking out in a cold sweat, nausea, or lightheadedness. Don't wait more than five minutes to call 911 - MINUTES MATTER! Fast action can save your life. Calling 911 is almost always the fastest way to get lifesaving treatment. Emergency Medical Services staff can begin treatment when they arrive -- up to an hour sooner than if someone gets to the hospital by car. The discharge information has been reviewed with the patient and spouse. The patient and spouse verbalized understanding. Discharge medications reviewed with the patient and spouse and appropriate educational materials and side effects teaching were provided.   ___________________________________________________________________________________________________________________________________ Narcotic-Analgesic/Acetaminophen (Percocet, Norco, Lorcet HD, Lortab 10/325) - (By mouth)   Why this medicine is used:   Relieves pain. Contact a nurse or doctor right away if you have:  · Extreme weakness, shallow breathing, slow heartbeat  · Severe confusion, lightheadedness, dizziness, fainting  · Yellow skin or eyes, dark urine or pale stools  · Severe constipation, severe stomach pain, nausea, vomiting, loss of appetite  · Sweating or cold, clammy skin     Common side effects:  · Mild constipation, nausea, vomiting  · Sleepiness, tiredness  · Itching, rash  © 2017 [address] Information is for End User's use only and may not be sold, redistributed or otherwise used for commercial purposes.

## 2018-07-18 NOTE — ANESTHESIA PREPROCEDURE EVALUATION
Anesthetic History   No history of anesthetic complications            Review of Systems / Medical History  Patient summary reviewed and pertinent labs reviewed    Pulmonary        Sleep apnea: No treatment  Smoker         Neuro/Psych         Psychiatric history     Cardiovascular    Hypertension: well controlled              Exercise tolerance: >4 METS     GI/Hepatic/Renal  Within defined limits              Endo/Other    Diabetes: type 2    Obesity     Other Findings              Physical Exam    Airway  Mallampati: II  TM Distance: > 6 cm  Neck ROM: normal range of motion   Mouth opening: Normal     Cardiovascular  Regular rate and rhythm,  S1 and S2 normal,  no murmur, click, rub, or gallop             Dental  No notable dental hx       Pulmonary  Breath sounds clear to auscultation               Abdominal  GI exam deferred       Other Findings            Anesthetic Plan    ASA: 2  Anesthesia type: general      Post-op pain plan if not by surgeon: peripheral nerve block single    Induction: Intravenous  Anesthetic plan and risks discussed with: Patient

## 2018-07-18 NOTE — ANESTHESIA PROCEDURE NOTES
Peripheral Block    Start time: 7/18/2018 9:45 AM  End time: 7/18/2018 9:50 AM  Performed by: Jaky Lal  Authorized by: Jaky Lal       Pre-procedure: Indications: at surgeon's request and post-op pain management    Preanesthetic Checklist: patient identified, risks and benefits discussed, site marked, timeout performed, anesthesia consent given and patient being monitored    Timeout Time: 09:45          Block Type:   Block Type:   Interscalene  Laterality:  Left  Monitoring:  Standard ASA monitoring, responsive to questions, oxygen, continuous pulse ox, frequent vital sign checks and heart rate  Injection Technique:  Single shot  Procedures: ultrasound guided and nerve stimulator    Patient Position: supine  Prep: chlorhexidine    Location:  Interscalene  Needle Type:  Stimuplex  Needle Gauge:  20 G  Needle Localization:  Anatomical landmarks, infiltration, nerve stimulator and ultrasound guidance  Motor Response: minimal motor response >0.4 mA    Medication Injected:  0.5%  ropivacaine  Volume (mL):  25    Assessment:  Number of attempts:  1  Injection Assessment:  Incremental injection every 5 mL, negative aspiration for CSF, negative aspiration for blood, local visualized surrounding nerve on ultrasound, no paresthesia, ultrasound image on chart and no intravascular symptoms  Patient tolerance:  Patient tolerated the procedure well with no immediate complications

## 2018-07-18 NOTE — IP AVS SNAPSHOT
[address] [address] [phone] Patient: Beverly Tom MRN: [id number] MPO:[id number]/6303 About your hospitalization You were admitted on:  July 18, 2018 You last received care in the:  [facility] PACU You were discharged on:  July 18, 2018 Why you were hospitalized Your primary diagnosis was:  Not on File Follow-up Information Follow up With Details Comments Contact Info Amarjit Wilkes MD   [address] [facility] [address] [place] 
[phone] Mendoza Shah MD Go on 7/27/2018  [address] [facility] [address] [facility] [address] 
[phone] Your Scheduled Appointments Friday July 27, 2018 10:45 AM EDT  
POST OP with Mendoza Shah MD  
[facility] - [facility] ([facility] [address]  
[phone] Discharge Orders None A check agapito indicates which time of day the medication should be taken. My Medications START taking these medications Instructions Each Dose to Equal  
 Morning Noon Evening Bedtime  
 oxyCODONE-acetaminophen 5-325 mg per tablet Commonly known as:  PERCOCET Your last dose was: Your next dose is: Take 1-2 Tabs by mouth every four (4) hours as needed for Pain. Max Daily Amount: 12 Tabs. 1-2 Tab CONTINUE taking these medications Instructions Each Dose to Equal  
 Morning Noon Evening Bedtime Blood-Glucose Meter monitoring kit Your last dose was: Your next dose is:    
   
   
 Use bid as directed CALCIUM 600 + D 600-125 mg-unit Tab Generic drug:  calcium-cholecalciferol (d3) Your last dose was: Your next dose is: Take  by mouth. cholecalciferol 50,000 unit capsule Commonly known as:  VITAMIN D3  
   
 Your last dose was: Your next dose is: Take 1 Cap by mouth every seven (7) days. Indications: Vitamin D Deficiency 87755 Units  
    
   
   
   
  
 gabapentin 300 mg capsule Commonly known as:  NEURONTIN Your last dose was: Your next dose is: Take 1 Cap by mouth two (2) times a day. Indications: NEUROPATHIC PAIN  
 300 mg  
    
   
   
   
  
 glucose blood VI test strips strip Commonly known as:  blood glucose test  
   
Your last dose was: Your next dose is:    
   
   
 Use bid. Lancets Misc Your last dose was: Your next dose is:    
   
   
 Use bid.  
     
   
   
   
  
 metFORMIN 500 mg tablet Commonly known as:  GLUCOPHAGE Your last dose was: Your next dose is: Take 1 Tab by mouth daily (with breakfast). 500 mg  
    
   
   
   
  
 omeprazole 20 mg capsule Commonly known as:  PRILOSEC Your last dose was: Your next dose is: TAKE ONE CAPSULE BY MOUTH DAILY  DAYS  
     
   
   
   
  
  
STOP taking these medications   
 diclofenac potassium 50 mg tablet Commonly known as:  CATAFLAM  
   
  
 traMADol 50 mg tablet Commonly known as:  ULTRAM  
   
  
  
  
Where to Get Your Medications Information on where to get these meds will be given to you by the nurse or doctor. ! Ask your nurse or doctor about these medications  
  oxyCODONE-acetaminophen 5-325 mg per tablet Opioid Education Prescription Opioids: What You Need to Know: 
 
Prescription opioids can be used to help relieve moderate-to-severe pain and are often prescribed following a surgery or injury, or for certain health conditions. These medications can be an important part of treatment but also come with serious risks. Opioids are strong pain medicines.  Examples include hydrocodone, oxycodone, fentanyl, and morphine. Heroin is an example of an illegal opioid. It is important to work with your health care provider to make sure you are getting the safest, most effective care. WHAT ARE THE RISKS AND SIDE EFFECTS OF OPIOID USE? Prescription opioids carry serious risks of addiction and overdose, especially with prolonged use. An opioid overdose, often marked by slow breathing, can cause sudden death. The use of prescription opioids can have a number of side effects as well, even when taken as directed. · Tolerance-meaning you might need to take more of a medication for the same pain relief · Physical dependence-meaning you have symptoms of withdrawal when the medication is stopped. Withdrawal symptoms can include nausea, sweating, chills, diarrhea, stomach cramps, and muscle aches. Withdrawal can last up to several weeks, depending on which drug you took and how long you took it. · Increased sensitivity to pain · Constipation · Nausea, vomiting, and dry mouth · Sleepiness and dizziness · Confusion · Depression · Low levels of testosterone that can result in lower sex drive, energy, and strength · Itching and sweating RISKS ARE GREATER WITH:      
· History of drug misuse, substance use disorder, or overdose · Mental health conditions (such as depression or anxiety) · Sleep apnea · Older age (72 years or older) · Pregnancy Avoid alcohol while taking prescription opioids. Also, unless specifically advised by your health care provider, medications to avoid include: · Benzodiazepines (such as Xanax or Valium) · Muscle relaxants (such as Soma or Flexeril) · Hypnotics (such as Ambien or Lunesta) · Other prescription opioids KNOW YOUR OPTIONS Talk to your health care provider about ways to manage your pain that don't involve prescription opioids. Some of these options may actually work better and have fewer risks and side effects. Options may include: · Pain relievers such as acetaminophen, ibuprofen, and naproxen · Some medications that are also used for depression or seizures · Physical therapy and exercise · Counseling to help patients learn how to cope better with triggers of pain and stress. · Application of heat or cold compress · Massage therapy · Relaxation techniques Be Informed Make sure you know the name of your medication, how much and how often to take it, and its potential risks & side effects. IF YOU ARE PRESCRIBED OPIOIDS FOR PAIN: 
· Never take opioids in greater amounts or more often than prescribed. Remember the goal is not to be pain-free but to manage your pain at a tolerable level. · Follow up with your primary care provider to: · Work together to create a plan on how to manage your pain. · Talk about ways to help manage your pain that don't involve prescription opioids. · Talk about any and all concerns and side effects. · Help prevent misuse and abuse. · Never sell or share prescription opioids · Help prevent misuse and abuse. · Store prescription opioids in a secure place and out of reach of others (this may include visitors, children, friends, and family). · Safely dispose of unused/unwanted prescription opioids: Find your community drug take-back program or your pharmacy mail-back program, or flush them down the toilet, following guidance from the Food and Drug Administration (www.fda.gov/Drugs/ResourcesForYou). · Visit www.cdc.gov/drugoverdose to learn about the risks of opioid abuse and overdose. · If you believe you may be struggling with addiction, tell your health care provider and ask for guidance or call [address] [place], [place] [address] at [phone]. Discharge Instructions Rotator Cuff Repair: What to Expect at Home Your Recovery Rotator cuff repair surgery is done to fix a tear in the rotator cuff.  It can also include cleaning the space between the rotator cuff tendons and the shoulder blade. This is called subacromial smoothing. You will feel tired for several days. Your shoulder will be swollen, and you may notice that your skin is a different color near the cut (incision). Your hand and arm may also be swollen. This is normal and will start to get better in a few days. It will be several months before you have complete use of your shoulder and arm. Once you have healed from surgery, you will need to build your strength and the motion of your joint with rehabilitation (rehab) exercises. In time, your shoulder will likely be stronger, less painful, and more flexible than it was before the surgery. This care sheet gives you a general idea about how long it will take for you to recover. But each person recovers at a different pace. Follow the steps below to get better as quickly as possible. How can you care for yourself at home? Activity 
  · Rest when you feel tired. Getting enough sleep will help you recover. Do not lie flat or sleep on your side. Raise your upper body on two or three pillows, or sleep in a reclining chair.  
  · Try to walk each day. Start by walking a little more than you did the day before. Bit by bit, increase the amount you walk. Walking boosts blood flow and helps prevent pneumonia and constipation.  
  · Your arm will be in a sling or other device to prevent it from moving for 4 to 8 weeks. ¨ Always use the sling when you are walking or standing. ¨ If you are sitting or lying down, you can loosen the sling, but do not remove it. This lets your elbow straighten without moving the shoulder. You can also support your arm on a pillow. ¨ Remove the sling only to do prescribed exercises or to shower.  
  · You will not have complete use of your affected arm for 3 to 4 months after surgery.  
¨ You can use your affected arm for writing, eating, or drinking, but move it only at the elbow or wrist. Do not use it for anything else except prescribed exercises until the sling has been removed. ¨ When the sling has been removed, you can do activities that do not involve lifting, pushing, pulling, or carrying. You may not be able to do overhead lifting for 6 to 12 months.  
  · If you have a desk job, you will probably be able to return to work or your normal routine in 1 to 2 weeks. If you have a more active job, you may be away from work for 3 to 4 months or longer. If you work at a job that involves heavy manual labor, lifting your arms above your head, or the use of heavy tools, you may have to think about making changes to your job.  
  · If the rotator cuff repair was done arthroscopically, you can take a shower 48 to 72 hours after surgery. Remove the sling, and leave your arm by your side. To wash under your armpit, lean over and let the arm fall away from your body. Do not raise your arm. You may want to use a shower stool for a day or two.  
  · If you had open surgery, do not shower until you see your doctor and he or she okays it. You can wash the incisions with regular soap and water.  
  · Ask your doctor when you can drive again. This may take about 6 weeks or until you are no longer wearing the sling. Diet 
  · You can eat your normal diet. If your stomach is upset, try bland, low-fat foods like plain rice, broiled chicken, toast, and yogurt. Drink plenty of fluids.  
  · You may notice that your bowel movements are not regular right after your surgery. This is common. Try to avoid constipation and straining with bowel movements. You may want to take a fiber supplement every day. If you have not had a bowel movement after a couple of days, ask your doctor about taking a mild laxative. Medicines 
  · Your doctor will tell you if and when you can restart your medicines. He or she will also give you instructions about taking any new medicines.   · If you take blood thinners, such as warfarin (Coumadin), clopidogrel (Plavix), or aspirin, be sure to talk to your doctor. He or she will tell you if and when to start taking those medicines again. Make sure that you understand exactly what your doctor wants you to do.  
  · Take pain medicines exactly as directed. ¨ If the doctor gave you a prescription medicine for pain, take it as prescribed. Use pain medicine when you first notice pain, before it becomes severe. It is easier to prevent pain early than to stop it once it gets bad. ¨ If you are not taking a prescription pain medicine, ask your doctor if you can take an over-the-counter medicine.  
  · If your doctor prescribed antibiotics, take them as directed. Do not stop taking them just because you feel better. You need to take the full course of antibiotics.  
  · If you think your pain medicine is making you sick to your stomach: 
¨ Take your medicine after meals (unless your doctor has told you not to). ¨ Ask your doctor for a different pain medicine. Incision care 
  · If you had arthroscopic surgery, you may remove the bandage over your cut (incision) 24 to 48 hours after the surgery. Keep the bandage clean and dry.  
  · If you had open surgery, do not remove your bandage until you see your doctor and he or she okays it. Keep the bandage clean and dry.  
  · If your incision is open to the air, keep the area clean and dry.  
  · If you have strips of tape on the incision, leave the tape on for a week or until it falls off. Exercise 
  · Shoulder rehabilitation is a series of exercises you do after your surgery. This helps you get back your shoulder's range of motion and strength. You will work with your doctor and physical therapist to plan this exercise program. Rehab may not start until 6 weeks after the surgery. To get the best results, you need to do the exercises correctly and as often and for as long as your doctor tells you.  
Felecia Vasuqez   · To reduce swelling and pain, put ice or a cold pack on your shoulder for 10 to 20 minutes at a time. Do this every 1 to 2 hours. Put a thin cloth between the ice and your skin. Follow-up care is a key part of your treatment and safety. Be sure to make and go to all appointments, and call your doctor if you are having problems. It's also a good idea to know your test results and keep a list of the medicines you take. When should you call for help? Call 911 anytime you think you may need emergency care. For example, call if: 
  · You passed out (lost consciousness).  
  · You have severe trouble breathing.  
  · You have sudden chest pain and shortness of breath, or you cough up blood.  
 Call your doctor now or seek immediate medical care if: 
  · You have numbness, tingling, or a bluish color in your fingers or hand.  
  · You have severe nausea or vomiting.  
  · You have pain that does not go away after you take pain medicine.  
  · You have loose stitches, or your incision comes open.  
  · Your incision bleeds through your first bandage or is still bleeding 3 days after your surgery.  
  · You have signs of infection, such as: 
¨ Increased pain, swelling, warmth, or redness. ¨ Red streaks leading from the incision. ¨ Pus draining from the incision. ¨ A fever.  
 Watch closely for any changes in your health, and be sure to contact your doctor if: 
  · You do not have a bowel movement after taking a laxative. Where can you learn more? Go to http://delonte-antonio.info/. Enter N283 in the search box to learn more about \"Rotator Cuff Repair: What to Expect at Home. \" Current as of: November 29, 2017 Content Version: 11.7 © 8445-3673 [employer]. Care instructions adapted under license by [employer] (which disclaims liability or warranty for this information).  If you have questions about a medical condition or this instruction, always ask your healthcare professional. [place] [address] any warranty or liability for your use of this information. DISCHARGE SUMMARY from Nurse PATIENT INSTRUCTIONS: 
 
 
F-face looks uneven A-arms unable to move or move unevenly S-speech slurred or non-existent T-time-call 911 as soon as signs and symptoms begin-DO NOT go Back to bed or wait to see if you get better-TIME IS BRAIN. Warning Signs of HEART ATTACK Call 911 if you have these symptoms: 
? Chest discomfort. Most heart attacks involve discomfort in the center of the chest that lasts more than a few minutes, or that goes away and comes back. It can feel like uncomfortable pressure, squeezing, fullness, or pain. ? Discomfort in other areas of the upper body. Symptoms can include pain or discomfort in one or both arms, the back, neck, jaw, or stomach. ? Shortness of breath with or without chest discomfort. ? Other signs may include breaking out in a cold sweat, nausea, or lightheadedness. Don't wait more than five minutes to call [address]! Fast action can save your life. Calling 911 is almost always the fastest way to get lifesaving treatment. Emergency Medical Services staff can begin treatment when they arrive  up to an hour sooner than if someone gets to the hospital by car. The discharge information has been reviewed with the patient and spouse. The patient and spouse verbalized understanding. Discharge medications reviewed with the patient and spouse and appropriate educational materials and side effects teaching were provided. ___________________________________________________________________________________________________________________________________ Narcotic-Analgesic/Acetaminophen (Percocet, Norco, Lorcet HD, Lortab 10/325) - (By mouth) Why this medicine is used:  
Relieves pain. Contact a nurse or doctor right away if you have: 
· Extreme weakness, shallow breathing, slow heartbeat · Severe confusion, lightheadedness, dizziness, fainting · Yellow skin or eyes, dark urine or pale stools · Severe constipation, severe stomach pain, nausea, vomiting, loss of appetite · Sweating or cold, clammy skin Common side effects: · Mild constipation, nausea, vomiting · Sleepiness, tiredness · Itching, rash © 2017 [address] Information is for End User's use only and may not be sold, redistributed or otherwise used for commercial purposes. Introducing [facility] SERVICES! Dear Jarad Delgado: 
Thank you for requesting a [employer] account. Our records indicate that you already have an active [employer] account. You can access your account anytime at [email or web address] Did you know that you can access your hospital and ER discharge instructions at any time in [employer] You can also review all of your test results from your hospital stay or ER visit. Additional Information If you have questions, please visit the Frequently Asked Questions section of the [employer] website at [email or web address] Remember, [employer] is NOT to be used for urgent needs. For medical emergencies, dial 911. Now available from your iPhone and Android! Introducing Rony Wilson As a [facility] patient, I wanted to make you aware of our electronic visit tool called Rony Wilson. [facility] 24/7 allows you to connect within minutes with a medical provider 24 hours a day, seven days a week via a mobile device or tablet or logging into a secure website from your computer. You can access Rony Wilson from anywhere in the [place]. A virtual visit might be right for you when you have a simple condition and feel like you just dont want to get out of bed, or cant get away from work for an appointment, when your regular [facility] provider is not available (evenings, weekends or holidays), or when youre out of town and need minor care. Electronic visits cost only $49 and if the [employer] 24/SNAPP' provider determines a prescription is needed to treat your condition, one can be electronically transmitted to a nearby pharmacy*. Please take a moment to enroll today if you have not already done so. The enrollment process is free and takes just a few minutes. To enroll, please download the [employer] alba to your tablet or phone, or visit [email or web address] to enroll on your computer. And, as an [address] [place] [address] patient with a [employer] account, the results of your visits will be scanned into your electronic medical record and your primary care provider will be able to view the scanned results. We urge you to continue to see your regular [facility] provider for your ongoing medical care. And while your primary care provider may not be the one available when you seek a Rony Wilson virtual visit, the peace of mind you get from getting a real diagnosis real time can be priceless. For more information on Rony Wilson, view our Frequently Asked Questions (FAQs) at [email or web address]. Sincerely, 
 
Keri Louise MD 
Chief Medical Officer Cliff Prakash *:  certain medications cannot be prescribed via Rony Wilson Providers Seen During Your Hospitalization Provider Specialty Primary office phone Rowena Jones MD Orthopedic Surgery [phone] Your Primary Care Physician (PCP) Primary Care Physician Office Phone Office Fax Moiz Banuelos [phone] You are allergic to the following No active allergies Recent Documentation Height Weight BMI Smoking Status 1.88 m 136.6 kg 38.67 kg/m2 Current Every Day Smoker Emergency Contacts Name Discharge Info Relation Home Work Mobile Leatha Santos DISCHARGE CAREGIVER [3] Spouse [3] [phone] Patient Belongings The following personal items are in your possession at time of discharge: 
  Dental Appliances: None  Visual Aid: Glasses      Home Medications: None   Jewelry: None  Clothing: Pants, Shirt, Undergarments, Footwear, Socks, Janie    Other Valuables: [employer] Please provide this summary of care documentation to your next provider. Signatures-by signing, you are acknowledging that this After Visit Summary has been reviewed with you and you have received a copy. Patient Signature:  ____________________________________________________________ Date:  ____________________________________________________________  
  
Jovanni Ahmadi Provider Signature:  ____________________________________________________________ Date:  ____________________________________________________________

## 2018-07-18 NOTE — ANESTHESIA POSTPROCEDURE EVALUATION
Post-Anesthesia Evaluation and Assessment    Patient: Pj Rubin MRN: [id number]  SSN: [id number]    YOB: 1975  Age: 37 y.o. Sex: male      Data from PACU flowsheet    Cardiovascular Function/Vital Signs  Visit Vitals    /76    Pulse 72    Temp 36.4 °C (97.5 °F)    Resp 14    Ht 6' 2\" (1.88 m)    Wt 136.6 kg (301 lb 3.2 oz)    SpO2 94%    BMI 38.67 kg/m2       Patient is status post general anesthesia for Procedure(s):  RIGHT SHOULDER ARTHROSCOPIC ROTATOR CUFF REPAIR/ BICEPS TENDONDESIS/ CLAVICLE DECOMPRESSION/ARTHREX/SPIDER/T-MAX/NERVE BLOCK. Nausea/Vomiting: controlled    Postoperative hydration reviewed and adequate. Pain:  Pain Scale 1: Numeric (0 - 10) (07/18/18 1727)  Pain Intensity 1: 0 (07/18/18 1727)   Managed      Mental Status and Level of Consciousness: Alert and oriented     Pulmonary Status:   O2 Device: Oxygen mask (07/18/18 1609)   Adequate oxygenation and airway patent    Complications related to anesthesia: None    Post-anesthesia assessment completed.  No concerns    Signed By: Che Guzmán MD     July 18, 2018

## 2018-07-19 NOTE — OP NOTES
[address] [place] [address]   OPERATIVE REPORT    Silvino Toledo  MR#: [id number]  : 1975  ACCOUNT #: [de-identified]   DATE OF SERVICE: 2018    PREOPERATIVE DIAGNOSES:  1. Right shoulder partial rotator cuff tear, biceps. 2.  Right shoulder biceps tendinitis. 3.  Right shoulder acromioclavicular arthritis. 4.  Right shoulder impingement. POSTOPERATIVE DIAGNOSES:     1. Right shoulder partial rotator cuff tear, biceps. 2.  Right shoulder biceps tendinitis. 3.  Right shoulder acromioclavicular arthritis. 4.  Right shoulder impingement. PROCEDURE PERFORMED:  1. Right shoulder arthroscopic rotator cuff repair, CPT code 32789.  2.  Right shoulder arthroscopic biceps tenodesis, CPT code 48310.  3.  Right shoulder arthroscopic distal clavicle resection, CPT code 61073.  4.  Right shoulder subacromial decompression, CPT 42041. SURGEON:  Kala Gagnon MD.    ANESTHESIA:  General with interscalene nerve block. ASSISTANT:  Rashmi Narvaez SA.    IMPLANTS:  Arthrex suture anchors. IV FLUIDS:  1400 mL of LR.    ESTIMATED BLOOD LOSS:  Minimal.    COMPLICATIONS:  None. IMPLANTS:  __    INDICATION FOR PROCEDURE:  The patient is a 47-year-old male with right shoulder pain that had failed conservative treatment. On physical exam, he had biceps tendinitis as well as pain in his rotator cuff. He also had AC arthritis that was symptomatic as well as impingement. After discussing the treatment options, he elected to undergo the procedure as described. DESCRIPTION OF THE PROCEDURE:  The patient was identified in the preoperative holding area. The right shoulder was marked as the operative extremity after confirmation with the patient. After discussing the risks and benefits, informed consent was confirmed. Anesthesia performed an interscalene nerve block in the preoperative area. The patient was then taken to the OR. He was moved from the stretcher to the OR table.   General anesthesia was induced and he was intubated. He was brought into the beach chair position. All bony prominences were well padded. SCDs were in place. Right arm was prepped and draped in normal sterile fashion. A timeout was performed verifying correct patient, procedure, and operative extremity. Antibiotics were given prior to skin incision. The surgery commenced with the posterior portal placement through a stab incision. The scope was introduced into the glenohumeral joint. A glenohumeral diagnostic arthroscopy revealed some fraying of the biceps tendon and some thickening of the biceps tendon as it was brought into the joint. Therefore, decision was made to perform a biceps tenodesis. The biceps tendon was tenotomized with the use of arthroscopic biters. It was tagged with a spinal needle. The rest of the glenohumeral joint revealed a normal articular cartilage, no labral degeneration or tearing, and a partial thickness rotator cuff tear. The scope was then brought into the subacromial space. A complete bursectomy was performed in the subacromial space. The rotator cuff tendon was probed and found to have an anterior thinning at the anterior aspect of the supraspinatus. A shaver was easily used to debride this. Attention was turned to the biceps tenodesis. The biceps tendon was brought out of the bicipital groove. A reamer hole was made in the bicipital groove and the tendon was dunked in using the Arthrex tuning fork suture anchor. This completed the tenodesis. The anterolateral border of the acromion was also shaved down for the acromioplasty. The rotator cuff was debrided and the greater tuberosity was also debrided and a bur was used to expose bleeding bone. A suture anchor was placed at the articular margin. The sutures were passed through the rotator cuff and tied down.   They were brought out through a lateral row in the lateral proximal humerus completing the double row arthroscopic rotator cuff repair. Attention was then turned to the distal clavicle. Approximately 7 mm of the distal clavicle was excised with the use of a bur. The wound was then copiously irrigated. All loose debris was removed. The scope instruments were removed. The wounds were closed with a suture. A sterile dressing was placed. He was put into a shoulder immobilizer. He was awakened from anesthesia and taken to PACU in stable condition with a plan for discharge home.       MD DESIRAE Negrete / CHARITO  D: 07/18/2018 16:30     T: 07/18/2018 16:52  JOB #: [id number]

## 2018-07-27 ENCOUNTER — OFFICE VISIT (OUTPATIENT)
Dept: ORTHOPEDIC SURGERY | Age: 43
End: 2018-07-27

## 2018-07-27 VITALS
WEIGHT: 301 LBS | HEART RATE: 63 BPM | SYSTOLIC BLOOD PRESSURE: 125 MMHG | HEIGHT: 74 IN | DIASTOLIC BLOOD PRESSURE: 84 MMHG | OXYGEN SATURATION: 96 % | BODY MASS INDEX: 38.63 KG/M2

## 2018-07-27 DIAGNOSIS — M25.511 ACUTE PAIN OF RIGHT SHOULDER: ICD-10-CM

## 2018-07-27 DIAGNOSIS — M75.121 COMPLETE TEAR OF RIGHT ROTATOR CUFF: Primary | ICD-10-CM

## 2018-07-27 RX ORDER — OXYCODONE AND ACETAMINOPHEN 5; 325 MG/1; MG/1
1-2 TABLET ORAL
Qty: 60 TAB | Refills: 0 | Status: SHIPPED | OUTPATIENT
Start: 2018-07-27 | End: 2020-04-01 | Stop reason: ALTCHOICE
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
## 2018-07-27 NOTE — LETTER
NOTIFICATION RETURN TO WORK / SCHOOL 
 
7/27/2018 11:02 AM 
 
Mr. Kizzy Marquez [address] [place] [id number] To Whom It May Concern: 
 
Kizzy Marquez is currently under the care of [address]. He will return to work/school on: 7/30/2018 with the following restrictions: 
 
No use of the right arm. Right arm to remain in the sling at all times. These restrictions are to remain in place until further notice. If there are questions or concerns please have the patient contact our office.  
 
 
 
Sincerely, 
 
 
Hetal Amaya MD

## 2018-08-01 ENCOUNTER — TELEPHONE (OUTPATIENT)
Dept: ORTHOPEDIC SURGERY | Facility: CLINIC | Age: 43
End: 2018-08-01

## 2018-08-01 NOTE — TELEPHONE ENCOUNTER
Spoke to Dr Jodie Salamanca who authorized the letter to be written. Note was written and will be faxed to [address] [place] [address] office, as requested by patient. Patient thanked writer for the call.

## 2018-08-01 NOTE — TELEPHONE ENCOUNTER
Patient called to request an updated letter for employer from Dr. Tripathi Patient. He tried to go back to work after shoulder surgery 7/18, but he just cannot do it yet. He is asking that we update his status to stay out of work until his next office visit 8/22. Please advise patient when ready or if any questions at [phone].

## 2018-08-09 ENCOUNTER — TELEPHONE (OUTPATIENT)
Dept: ORTHOPEDIC SURGERY | Age: 43
End: 2018-08-09

## 2018-08-09 NOTE — TELEPHONE ENCOUNTER
Patient called in had to move his post op appt to 08/27/18, patient is requesting another work letter. He said it can state the same thing as the other but that his f/u appt has changed to and that he can return to work after being seen. Please contact patient at [phone].

## 2018-08-10 ENCOUNTER — TELEPHONE (OUTPATIENT)
Dept: ORTHOPEDIC SURGERY | Facility: CLINIC | Age: 43
End: 2018-08-10

## 2018-08-10 NOTE — TELEPHONE ENCOUNTER
Note was written by Dr Mika Connolly. Patient was called and notified. Note was placed at the [facility] .

## 2018-08-10 NOTE — TELEPHONE ENCOUNTER
Patient is asking for an updated letter for employer. He provided the letter we prepared today but they have additional requirements. They are asking for \"A statement from physician office staff stating diagnosis/prognosis, how long going to be out, expected return or statement can say it is not yet determined when patient will return\".   Please update patients letter and advise when ready for  at [phone]

## 2018-08-10 NOTE — TELEPHONE ENCOUNTER
Called and made patient aware that his note was written and is available for  at the [facility] office.

## 2018-08-13 DIAGNOSIS — E55.9 VITAMIN D DEFICIENCY: ICD-10-CM

## 2018-08-13 DIAGNOSIS — R10.9 ABDOMINAL PAIN, UNSPECIFIED ABDOMINAL LOCATION: ICD-10-CM

## 2018-08-14 RX ORDER — ASPIRIN 325 MG
50000 TABLET, DELAYED RELEASE (ENTERIC COATED) ORAL
Qty: 8 CAP | Refills: 0 | Status: SHIPPED | OUTPATIENT
Start: 2018-08-14 | End: 2020-08-05

## 2018-08-14 RX ORDER — OMEPRAZOLE 20 MG/1
CAPSULE, DELAYED RELEASE ORAL
Qty: 30 CAP | Refills: 2 | Status: SHIPPED | OUTPATIENT
Start: 2018-08-14 | End: 2020-04-01 | Stop reason: SDUPTHER

## 2018-08-14 NOTE — TELEPHONE ENCOUNTER
From: Katy Sandoval  To: Annie Nails MD  Sent: 8/13/2018 12:12 PM EDT  Subject: Medication Renewal Request    Original authorizing provider: MD Katy Lewis would like a refill of the following medications:  omeprazole (PRILOSEC) 20 mg capsule Annie Nails MD]  cholecalciferol (VITAMIN D3) 50,000 unit capsule Annie Nails MD]    Preferred pharmacy: [address]:

## 2018-08-16 ENCOUNTER — TELEPHONE (OUTPATIENT)
Dept: ORTHOPEDIC SURGERY | Age: 43
End: 2018-08-16

## 2018-08-16 DIAGNOSIS — G89.18 ACUTE POST-OPERATIVE PAIN: Primary | ICD-10-CM

## 2018-08-16 DIAGNOSIS — M75.101 TEAR OF RIGHT ROTATOR CUFF, UNSPECIFIED TEAR EXTENT: Primary | ICD-10-CM

## 2018-08-16 DIAGNOSIS — M75.101 TEAR OF RIGHT ROTATOR CUFF, UNSPECIFIED TEAR EXTENT: ICD-10-CM

## 2018-08-16 RX ORDER — OXYCODONE AND ACETAMINOPHEN 5; 325 MG/1; MG/1
1 TABLET ORAL
Qty: 21 TAB | Refills: 0 | Status: SHIPPED | OUTPATIENT
Start: 2018-08-16 | End: 2018-09-18 | Stop reason: SDUPTHER

## 2018-08-16 RX ORDER — OXYCODONE AND ACETAMINOPHEN 5; 325 MG/1; MG/1
1-2 TABLET ORAL
Qty: 50 TAB | Refills: 0 | Status: SHIPPED | OUTPATIENT
Start: 2018-08-16 | End: 2018-08-16 | Stop reason: SDUPTHER

## 2018-08-16 NOTE — TELEPHONE ENCOUNTER
Medication Refill            Saritha Koch routed conversation to You 14 minutes ago (12:02 PM)                     Adams Serrano [phone]  Melarashanda Koch 18 minutes ago (11:57 AM)                       Melarashanda Koch 21 minutes ago (11:54 AM)                 Patient called to request a refill of pain medication ( oxyCODONE-acetaminophen (PERCOCET) 5-325 mg per tablet)    from Dr. Yamile Velez. Patient is due to go on vacation tomorrow so he was advised that Dr. Yamile Velez may not be available to sign a prescription before he leaves at 10 am tomorrow.   Please advise patient when reacy at  [phone]

## 2018-08-16 NOTE — TELEPHONE ENCOUNTER
Patient called to request a refill of pain medication ( oxyCODONE-acetaminophen (PERCOCET) 5-325 mg per tablet)    from Dr. China Godoy. Patient is due to go on vacation tomorrow so he was advised that Dr. China Godoy may not be available to sign a prescription before he leaves at 10 am tomorrow.   Please advise patient when reacy at  [phone]

## 2018-08-16 NOTE — TELEPHONE ENCOUNTER
Last Visit: 07/27/2018 with MD Valentina Cantrell   Date of Surgery: 07/18/2018 Right shoulder arthroscopic RCR  Next Appointment: 08/27/2018 with ROXANA Jay   Previous Refill Encounters: 07/27/2018 per MD [id number]     Requested Prescriptions     Pending Prescriptions Disp Refills    oxyCODONE-acetaminophen (PERCOCET) 5-325 mg per tablet 60 Tab 0     Sig: Take 1-2 Tabs by mouth every four (4) hours as needed for Pain. Max Daily Amount: 12 Tabs.

## 2018-08-16 NOTE — TELEPHONE ENCOUNTER
rx printed to [facility].  New order placed in new encounter to print at [facility]

## 2018-08-16 NOTE — TELEPHONE ENCOUNTER
In previous encounter, Linh SCHMIDT approved refill, but printed to [facility]. New encounter sent to Tarsha Sharp to be signed at [facility].

## 2018-08-27 ENCOUNTER — OFFICE VISIT (OUTPATIENT)
Dept: ORTHOPEDIC SURGERY | Facility: CLINIC | Age: 43
End: 2018-08-27

## 2018-08-27 VITALS
HEART RATE: 82 BPM | WEIGHT: 296 LBS | DIASTOLIC BLOOD PRESSURE: 87 MMHG | BODY MASS INDEX: 37.99 KG/M2 | TEMPERATURE: 99 F | SYSTOLIC BLOOD PRESSURE: 133 MMHG | HEIGHT: 74 IN | OXYGEN SATURATION: 98 %

## 2018-08-27 DIAGNOSIS — G89.18 POST-OP PAIN: ICD-10-CM

## 2018-08-27 DIAGNOSIS — M75.121 COMPLETE TEAR OF RIGHT ROTATOR CUFF: Primary | ICD-10-CM

## 2018-08-27 RX ORDER — HYDROCODONE BITARTRATE AND ACETAMINOPHEN 7.5; 325 MG/1; MG/1
1 TABLET ORAL
Qty: 28 TAB | Refills: 0 | Status: SHIPPED | OUTPATIENT
Start: 2018-08-27 | End: 2020-08-05

## 2018-08-27 NOTE — PROGRESS NOTES
Inderjit Olvera  1975     HISTORY OF PRESENT ILLNESS  Inderjit Olvera is a 37 y.o. male who presents today for evaluation s/p Right shoulder arthroscopic rotator cuff repair, biceps tenodesis, and distal clavicle excision and decompression on 7/18/18. Patient has not been going to PT. Describes pain as a 5/10. Has been taking percocet for pain. Still has night pain. Patient denies any fever, chills, chest pain, shortness of breath or calf pain. There are no new illness or injuries to report since last seen in the office. PHYSICAL EXAM:   Visit Vitals    /87    Pulse 82    Temp 99 °F (37.2 °C) (Oral)    Ht 6' 2\" (1.88 m)    Wt 296 lb (134.3 kg)    SpO2 98%    BMI 38 kg/m2      The patient is a well-developed, well-nourished male in no acute distress. The patient is alert and oriented times three. The patient appears to be well groomed. Mood and affect are normal.  ORTHOPEDIC EXAM of right shoulder:  Inspection: swelling not present,  Bruising not present  Incisions well healed  Passive glenohumeral abduction 0-80 degrees, 0-60 degrees of forward flexion  Stability: Stable  Strength: n/a  2+ distal pulses    IMPRESSION:  S/P Right shoulder arthroscopic rotator cuff repair, biceps tenodesis, and distal clavicle excision and decompression    PLAN:   Pt doing well post operatively  Will d/c sling at this time. Will give pt stretches to do over the next 4 weeks. If stiff at next visit will do formal PT PROM only. If ROM looks good will continue stretches given today. No lifting, pushing, pulling with operative arm. May perform tasks with arm at his side. Pt given a refill of pain medication. Norco 7.5 mg Patient given pain medication for short term acute pain relief. Goal is to treat patient according to above plan and to ultimately have patient off all pain medications once appropriate.  If chronic pain management is required beyond what is expected for current orthopedic problem, will refer patient to pain management.  was reviewed and will be reviewed with every medication refill request.   He was also given a work note to go back to work 8/28/18. With no use of right arm. Stressed to patient that nothing causes an increase in pain.   RTC 4 weeks    Patient seen and evaluated by Dr. Ingris Ramirez today who agrees with treatment plan    Redd Molina [facility]

## 2018-08-27 NOTE — LETTER
NOTIFICATION RETURN TO WORK / SCHOOL 
 
8/27/2018 1:34 PM 
 
Mr. Johann Reynoso [address] [place] [id number] To Whom It May Concern: 
 
Johann Reynoso is currently under the care of [address] [place] [address]. He will return to work on: 8/28/18 with no use of right arm. If there are questions or concerns please have the patient contact our office.  
 
 
 
Sincerely, 
 
 
ROXANA Molina

## 2018-09-18 ENCOUNTER — OFFICE VISIT (OUTPATIENT)
Dept: ORTHOPEDIC SURGERY | Facility: CLINIC | Age: 43
End: 2018-09-18

## 2018-09-18 VITALS
DIASTOLIC BLOOD PRESSURE: 75 MMHG | TEMPERATURE: 99.1 F | SYSTOLIC BLOOD PRESSURE: 145 MMHG | BODY MASS INDEX: 39.14 KG/M2 | HEART RATE: 75 BPM | OXYGEN SATURATION: 94 % | RESPIRATION RATE: 16 BRPM | WEIGHT: 305 LBS | HEIGHT: 74 IN

## 2018-09-18 DIAGNOSIS — M75.121 COMPLETE TEAR OF RIGHT ROTATOR CUFF: Primary | ICD-10-CM
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
## 2018-09-26 ENCOUNTER — HOSPITAL ENCOUNTER (OUTPATIENT)
Dept: PHYSICAL THERAPY | Age: 43
Discharge: HOME | End: 2018-09-26
Payer: OTHER GOVERNMENT

## 2018-09-26 PROCEDURE — 97110 THERAPEUTIC EXERCISES: CPT

## 2018-09-26 PROCEDURE — 97161 PT EVAL LOW COMPLEX 20 MIN: CPT

## 2018-09-26 PROCEDURE — 97140 MANUAL THERAPY 1/> REGIONS: CPT

## 2018-09-26 NOTE — PROGRESS NOTES
[facility] at [address] [place] [address] [address], [address]  [place], [facility] [address]  Phone: [phone]      Fax:  [phone]    Plan of Care/ Statement of Necessity for Physical Therapy Services  Patient name: Casa Garvin Start of Care: 2018   Referral source: Neelima Almanza MD : 1975    Medical Diagnosis: Right shoulder pain [M25.511]   Onset Date: Surgery 18    Treatment Diagnosis: Right shoulder weakness and limited mobility   Prior Hospitalization: see medical history Provider#: [id number]   Medications: Verified on Patient summary List    Comorbidities: DM   Prior Level of Function: Able to do normal daily activity and work with periodic popping and cracking. Was always stronger in the right arm       The Plan of Care and following information is based on the information from the initial evaluation. Assessment/ key information: Patient is a 37 y.o. male referred to PT with the above Dx. Patient seen today S/P Right RCR on 18. Pt reports doing PROM exercises provided by the MD that causes the pain to increase. Pt reports that he takes Advil to help manage the pain as well as Icing daily. Patient presents to PT with decreased strength, decreased flexibility, and decreased mobility of the right shoulder. Patient s/s appear to be consistent w/ diagnosis. Patient demonstrates the potential to make functional gains within a reasonable time frame and will benefit from skilled PT to address impairments and improve functional mobility and strength for an improved quality of life.   Fall Risk Assessment: Patient demonstrates no Fall Risk at day of evaluation   Evaluation Complexity History MEDIUM  Complexity : 1-2 comorbidities / personal factors will impact the outcome/ POC ; Examination LOW Complexity : 1-2 Standardized tests and measures addressing body structure, function, activity limitation and / or participation in recreation  ;Presentation LOW Complexity : Stable, uncomplicated  ;Clinical Decision Making MEDIUM Complexity : FOTO score of 26-74  Overall Complexity Rating: LOW   Problem List: pain affecting function, decrease ROM, decrease strength, edema affecting function, decrease ADL/ functional abilitiies, decrease activity tolerance, decrease flexibility/ joint mobility, decrease transfer abilities and other FOTO = 28   Treatment Plan may include any combination of the following: Therapeutic exercise, Therapeutic activities, Neuromuscular re-education, Physical agent/modality, Gait/balance training, Manual therapy, Patient education, Self Care training and Functional mobility training  Patient / Family readiness to learn indicated by: asking questions, trying to perform skills and interest  Persons(s) to be included in education: patient (P)  Barriers to Learning/Limitations: None  Patient Goal (s): Get the full use of my right arm, Be able to do push ups and pull ups  Patient Self Reported Health Status: poor - fair  Rehabilitation Potential: good    Short Term Goals: To be accomplished in 5 treatments:  1. Pt will be compliant and independent with HEP in order to facilitate PT sessions and aid with self management                        Eval Status:  Initiated                        Current Status:  2. Pt to tolerate 30 min or more of TE and/or Interventions w/o increased s/s                        Eval Status:  Initiated                        Current Status:  Long Term Goals: To be accomplished in 10 treatments:  1. Pt will report 50% improvement or better with involvement to show a significant increase in function                        Eval Status:  Initiated                        Current Status:  2. Pt will have unforced PROM to 120* Right Shoulder Flx/Abd for progress to AAROM w/o difficulty                         Eval Status:  Initiated                        Current Status:  3.   Pt will have decreased pain at 3/10 or better with MT for good tolerance allowing improved Right Shoulder mobility                        Eval Status:  5                        Current Status:  4. Pt will improve FOTO score to 64 in 17 visits to show significant improvement for progress to good right shoulder function                        Eval Status: FOTO 28                        Current Status:  Frequency / Duration: Patient to be seen 2-3 times per week for 10 treatments. Patient/ Caregiver education and instruction: Diagnosis, prognosis, self care, activity modification and exercises   Comments:  Patient provided w/HEP   [x]  Plan of care has been reviewed with GENEVIEVE Mccarty, PT 9/26/2018 9:25 AM  _____________________________________________________________________  I certify that the above Therapy Services are being furnished while the patient is under my care. I agree with the treatment plan and certify that this therapy is necessary.     Physician's Signature:____________Date:_________TIME:________    ** Signature, Date and Time must be completed for valid certification **    Please sign and return to [facility] at [address] [facility].[address] [place] [address], [facility] [address] [facility] [address]  Phone: [phone]      Fax:  [phone]

## 2018-09-26 NOTE — PROGRESS NOTES
PT DAILY TREATMENT NOTE/SHOULDER EVAL 3-16    Patient Name: Michael Haddad  Date:2018  : 1975  [x]  Patient  Verified  Payor: MARIETTA / Plan: Antonia Haney / Product Type: Gloria Stack /    In XCKL:[id number]  Out time:1015  Total Treatment Time (min): 50  Visit #: 1 of 10    Treatment Area: Right shoulder pain [M25.511]    SUBJECTIVE  Pain Level (0-10 scale): 4-5  [x]constant []intermittent []improving []worsening []no change since onset    Any medication changes, allergies to medications, adverse drug reactions, diagnosis change, or new procedure performed?: [x] No    [] Yes (see summary sheet for update)  Subjective functional status/changes:     Subjective:  Pt S/P Right RCR on 18. Pt reports doing PROM exercises provided by the MD that causes the pain to increase. Pt reports that he takes Advil to help with the pain as well as Icing it everyday. Chief Complaint/: Feels real heavy and stiff    Worse with: Movement and doing exercises    Better with:  Rest, ice and OTC medication    Onset: 18    Mechanism of Injury: RTC Tear due to over use    PMHx/Surgical Hx: DM    PLOF: Able to do normal daily activity and work with periodic popping and cracking. Was always stronger in the right arm    Goal: Get the full use of my right arm, Be able to do push ups and pull ups  What would make you feel like you have made progress: Being able to lift my arm above my head w/o pain    FOTO:  in 17 visits    What type of work do you do? [employer]     Living Situation:      What type of daily activities/hobbies?  Develop property at [employer], Work alot    Limitations to Activity/PLOF: Difficulty with washing hair, drinking coffee,      Current Health Status:  Poor - Fair    Barriers: [x]pain []financial []time []transportation []other    Motivation: High    Substance use: []Alcohol []Tobacco []other:     FABQ Score: []low []elevate    Cognition: A & O x 3    Other:    Risk For Falls: [x]No  []low []elevate     Balance:  Reports that he always had Poor Balance                  SLS        OBJECTIVE/EXAMINATION  - (B) Fwd Shoulders  - Decr mobility Right SC joint  - TTP Right Pec/Deltoids/Teres minor/Infraspinatus/lateral upper arm  - Limited tolerance with PROM  - Pt demo good balance during evaluation with standing and walking        Modality rationale: decrease edema, decrease inflammation and decrease pain to improve the patients ability to tolerate discomfort   Min Type Additional Details    [] Estim:  []Unatt       []IFC  []Premod                        []Other:  []w/ice   []w/heat  Position:  Location:    [] Estim: []Att    []TENS instruct  []NMES                    []Other:  []w/US   []w/ice   []w/heat  Position:  Location:    []  Traction: [] Cervical       []Lumbar                       [] Prone          []Supine                       []Intermittent   []Continuous Lbs:  [] before manual  [] after manual    []  Ultrasound: []Continuous   [] Pulsed                           []1MHz   []3MHz Location:  W/cm2:    []  Iontophoresis with dexamethasone         Location: [] Take home patch   [] In clinic   10 [x]  Ice     []  heat  []  Ice massage  []  Laser   []  Anodyne Position:  Location:    []  Laser with stim  []  Other: Position:  Location:    []  Vasopneumatic Device Pressure:       [] lo [] med [] hi   Temperature: [] lo [] med [] hi   [x] Skin assessment post-treatment:  [x]intact []redness- no adverse reaction    []redness - adverse reaction:     24 min [x]Eval                  []Re-Eval       14 min Therapeutic Exercise:  [] See flow sheet :  Develop and instruct HEP   Rationale: increase ROM to improve the patients ability for general PROM    12 min Manual Therapy:  PROM all planes, Right Shoulder   Rationale: decrease pain, increase ROM and increase tissue extensibility to improve shoulder mobility          With   [x] TE   [] TA   [] neuro   [] other: Patient Education: [x] Review HEP    [] Progressed/Changed HEP based on:   [] positioning   [] body mechanics   [] transfers   [] heat/ice application    [] other:      Other Objective/Functional Measures:        Pain Level (0-10 scale) post treatment: 3-4    ASSESSMENT/Changes in Function:   - Pt tolerated MT well with PROM to 90* Flx/ABD  Patient will continue to benefit from skilled PT services to modify and progress therapeutic interventions, address functional mobility deficits, address ROM deficits, address strength deficits, analyze and address soft tissue restrictions, analyze and cue movement patterns and analyze and modify body mechanics/ergonomics to attain remaining goals. []  See Plan of Care  []  See progress note/recertification  []  See Discharge Summary         Progress towards goals / Updated goals:  1. Pt will be compliant and independent with HEP in order to facilitate PT sessions and aid with self management   Eval Status:  Initiated   Current Status:  2. Pt to tolerate 30 min or more of TE and/or Interventions w/o increased s/s   Eval Status:  Initiated   Current Status:    1. Pt will report 50% improvement or better with involvement to show a significant increase in function   Eval Status:  Initiated   Current Status:  2. Pt will have unforced PROM to 120* Right Shoulder Flx/Abd for progress to AAROM w/o difficulty    Eval Status:  Initiated   Current Status:  3. Pt will have decreased pain at 3/10 or better with MT for good tolerance allowing improved Right Shoulder mobility   Eval Status:  5   Current Status:  4.   Pt will improve FOTO score to 64 in 17 visits to show significant improvement for progress to good right shoulder function   Eval Status: FOTO 28   Current Status:      PLAN  [x]  Upgrade activities as tolerated     [x]  Continue plan of care  []  Update interventions per flow sheet       []  Discharge due to:_  []  Other:_      Leoma Brittle, PT 9/26/2018  9:26 AM

## 2018-09-28 ENCOUNTER — OFFICE VISIT (OUTPATIENT)
Dept: ORTHOPEDIC SURGERY | Facility: CLINIC | Age: 43
End: 2018-09-28

## 2018-09-28 VITALS
SYSTOLIC BLOOD PRESSURE: 131 MMHG | OXYGEN SATURATION: 95 % | TEMPERATURE: 96.5 F | HEIGHT: 74 IN | DIASTOLIC BLOOD PRESSURE: 87 MMHG | BODY MASS INDEX: 38.37 KG/M2 | RESPIRATION RATE: 16 BRPM | HEART RATE: 69 BPM | WEIGHT: 299 LBS

## 2018-09-28 DIAGNOSIS — M75.121 COMPLETE TEAR OF RIGHT ROTATOR CUFF: Primary | ICD-10-CM

## 2018-09-28 RX ORDER — ACETAMINOPHEN 325 MG/1
TABLET ORAL
COMMUNITY
End: 2020-08-05

## 2018-09-28 RX ORDER — IBUPROFEN 200 MG
TABLET ORAL
COMMUNITY
End: 2020-08-05

## 2018-09-28 NOTE — LETTER
NOTIFICATION RETURN TO WORK / SCHOOL 
 
9/28/2018 1:31 PM 
 
Mr. Beverly Tom [address] [place] [id number] To Whom It May Concern: 
 
Beverly Tom is currently under the care of [address] [place] [address]. He will return to work on: 9/28/18 with no use of right arm. Until next appt in 4 weeks If there are questions or concerns please have the patient contact our office.  
 
 
 
Sincerely, 
 
 
ROXANA English

## 2018-09-28 NOTE — PROGRESS NOTES
Sung Leon  1975     HISTORY OF PRESENT ILLNESS  Sung Leon is a 37 y.o. male who presents today for evaluation s/p Right shoulder arthroscopic rotator cuff repair, biceps tenodesis, and distal clavicle excision and decompression on 7/18/18. Patient has been going to PT. Describes pain as a 2/10. Has been taking nothing for pain. He feels PT has really helped his motion and pain. He is also doing his exercises at home. Patient denies any fever, chills, chest pain, shortness of breath or calf pain. There are no new illness or injuries to report since last seen in the office. PHYSICAL EXAM:   Visit Vitals    /87    Pulse 69    Temp 96.5 °F (35.8 °C) (Oral)    Resp 16    Ht 6' 2\" (1.88 m)    Wt 299 lb (135.6 kg)    SpO2 95%    BMI 38.39 kg/m2      The patient is a well-developed, well-nourished male in no acute distress. The patient is alert and oriented times three. The patient appears to be well groomed. Mood and affect are normal.  ORTHOPEDIC EXAM of right shoulder:  Inspection: swelling not present,  Bruising not present  Incisions well healed  Passive glenohumeral abduction 0-85 degrees, 0-110 degrees of forward flexion  Stability: Stable  Strength: n/a  2+ distal pulses    IMPRESSION:  S/P Right shoulder arthroscopic rotator cuff repair, biceps tenodesis, and distal clavicle excision and decompression    PLAN:   Pt doing well post operatively  He was stiff at his last appt and Dr. Fernanda Amaya started PT PROM only. He will continue PT. No lifting, pushing, pulling with operative arm. May perform tasks with arm at his side. Pt not given a refill of pain medication. He was also given a work note to go back to work 9/28/18. With no use of right arm. Stressed to patient that nothing causes an increase in pain.   RTC 4 weeks      HUGH Tolbert [facility]

## 2018-10-02 ENCOUNTER — HOSPITAL ENCOUNTER (OUTPATIENT)
Dept: PHYSICAL THERAPY | Age: 43
Discharge: HOME | End: 2018-10-02
Payer: OTHER GOVERNMENT

## 2018-10-02 PROCEDURE — 97140 MANUAL THERAPY 1/> REGIONS: CPT

## 2018-10-02 PROCEDURE — 97110 THERAPEUTIC EXERCISES: CPT

## 2018-10-04 ENCOUNTER — HOSPITAL ENCOUNTER (OUTPATIENT)
Dept: PHYSICAL THERAPY | Age: 43
Discharge: HOME | End: 2018-10-04
Payer: OTHER GOVERNMENT

## 2018-10-04 PROCEDURE — 97140 MANUAL THERAPY 1/> REGIONS: CPT

## 2018-10-04 PROCEDURE — 97110 THERAPEUTIC EXERCISES: CPT

## 2018-10-04 NOTE — PROGRESS NOTES
PT DAILY TREATMENT NOTE 10-18 Patient Name: Tracy Zimmerman Date:10/4/2018 : 1975 [x]  Patient  Verified Payor: MARIETTA / Plan: Angel Jasso / Product Type: Trevoryonathan Carmen / In time: 1:24  Out time: 2:01 Total Treatment Time (min): 35 Visit #: 3 of 10 Treatment Area: Right shoulder pain [M25.511] SUBJECTIVE Pain Level (0-10 scale): 0 Any medication changes, allergies to medications, adverse drug reactions, diagnosis change, or new procedure performed?: [x] No    [] Yes (see summary sheet for update) Subjective functional status/changes:   [] No changes reported No pain today OBJECTIVE 27 min Therapeutic Exercise:  [x] See flow sheet :  
Rationale: increase ROM to improve the patients ability to progress to AROM 8 min Manual Therapy:  PROM right shoulder PROM Shoulder Flx/Ext/ABD/ER/IR,  Gentle LAD Right UE, PNF D1/D2 F/E pattern, scap mobs Rationale: decrease pain, increase ROM and increase tissue extensibility to tolerate increased activity levels With 
 [x] TE 
 [] TA 
 [] neuro 
 [] other: Patient Education: [x] Review HEP [] Progressed/Changed HEP based on:  
[] positioning   [] body mechanics   [] transfers   [] heat/ice application   
[] other:   
 
Other Objective/Functional Measures:  
- PROM Shoulder F/ABD Nearly full unforced Pain Level (0-10 scale) post treatment: 0 
 
ASSESSMENT/Changes in Function: - Good exercise participation - Good response to exercise and manual therapy - Increased freedom of motion right shouler - Minor pain with performing shoulder clicks to the 9 and 6 O'clock position Patient will continue to benefit from skilled PT services to modify and progress therapeutic interventions, address functional mobility deficits, address ROM deficits, address strength deficits, analyze and address soft tissue restrictions and analyze and cue movement patterns to attain remaining goals. []  See Plan of Care []  See progress note/recertification 
[]  See Discharge Summary Progress towards goals / Updated goals: 
Short Term Goals: To be accomplished in 5 treatments: 1.  Pt will be compliant and independent with HEP in order to facilitate PT sessions and aid with self management 
                      Eval Status:  Initiated 
                      Current Status: Progressng, pt reports compliance 10/2/18 2.  Pt to tolerate 30 min or more of TE and/or Interventions w/o increased s/s                       Eval Status:  Initiated 
                      MKOSTXL Status: Met at 43 Min 10/2/18 Long Term Goals: To be accomplished in 10 treatments: 1.  Pt will report 50% improvement or better with involvement to show a significant increase in function                       Eval Status:  Initiated 
                      OSJXATC Status: 2.  Pt will have unforced PROM to 120* Right Shoulder Flx/Abd for progress to AAROM w/o difficulty  
                      Eval Status:  Initiated 
                      FLMOTAS Status: Met with essentially full ROM unforced 10/2/18 3.  Pt will have decreased pain at 3/10 or better with MT for good tolerance allowing improved Right Shoulder mobility                       Eval Status:  5 
                      Current Status: 4.  Pt will improve FOTO score to 64 in 17 visits to show significant improvement for progress to good right shoulder function                       Eval Status: FOTO 28 
                      Current Status: PLAN [x]  Upgrade activities as tolerated     [x]  Continue plan of care 
[]  Update interventions per flow sheet      
[]  Discharge due to:_ 
[]  Other:_ Sheryl Aquino PT 10/4/2018  7:22 PM 
 
Future Appointments Date Time Provider uAsten Castellon 10/8/2018 4:00 PM Sheryl Aquino PT [address]  
10/10/2018 4:00 PM Sheryl Aquino PT [address]  
10/12/2018 10:30 AM Sheryl Aquino PT [address]  
 10/16/2018 4:30 PM Flori Hernandez, PT [facility]  
10/17/2018 4:00 PM Flori Hernandez, PT [facility]  
10/19/2018 10:00 AM Flori Hernandez, PT [facility]  
10/22/2018 4:00 PM Flori Hernandez, PT [facility]  
10/24/2018 4:00 PM Flori Hernandez, PT [facility]  
10/26/2018 10:30 AM Flori Hernandez, PT [facility]  
10/26/2018 3:45 PM Rodolfo Tinsley MD [place] [id number]

## 2018-10-08 ENCOUNTER — APPOINTMENT (OUTPATIENT)
Dept: PHYSICAL THERAPY | Age: 43
End: 2018-10-08
Payer: OTHER GOVERNMENT

## 2018-10-10 ENCOUNTER — APPOINTMENT (OUTPATIENT)
Dept: PHYSICAL THERAPY | Age: 43
End: 2018-10-10
Payer: OTHER GOVERNMENT

## 2018-10-12 ENCOUNTER — HOSPITAL ENCOUNTER (OUTPATIENT)
Dept: PHYSICAL THERAPY | Age: 43
Discharge: HOME | End: 2018-10-12
Payer: OTHER GOVERNMENT

## 2018-10-12 PROCEDURE — 97110 THERAPEUTIC EXERCISES: CPT

## 2018-10-12 PROCEDURE — 97140 MANUAL THERAPY 1/> REGIONS: CPT

## 2018-10-12 NOTE — PROGRESS NOTES
PT DAILY TREATMENT NOTE 10-18 Patient Name: Lorraine Warren Date:10/12/2018 : 1975 [x]  Patient  Verified Payor: MARIETTA / Plan: Faith Davila / Product Type: [employer] / In HVTQ:[id number]  Out time:1111 Total Treatment Time (min): 36 Visit #: 4 of 10 Treatment Area: Right shoulder pain [M25.511] SUBJECTIVE Pain Level (0-10 scale): 0 Any medication changes, allergies to medications, adverse drug reactions, diagnosis change, or new procedure performed?: [x] No    [] Yes (see summary sheet for update) Subjective functional status/changes:   [] No changes reported Reaching into the cabinet and into the dryer w/o difficulty OBJECTIVE 28 min Therapeutic Exercise:  [x] See flow sheet :  
Rationale: increase ROM, increase strength and improve coordination to improve the patients ability to move right UE independently 8 min Manual Therapy:  PROM right shoulder flx/ABD/ER Rationale: decrease pain, increase ROM and increase tissue extensibility to perform increased ADL With 
 [x] TE 
 [] TA 
 [] neuro 
 [] other: Patient Education: [x] Review HEP [] Progressed/Changed HEP based on:  
[] positioning   [] body mechanics   [] transfers   [] heat/ice application   
[] other:   
 
Other Objective/Functional Measures:  
- AROM Shoulder Flx/ABD WNL Pain Level (0-10 scale) post treatment: 0 
 
ASSESSMENT/Changes in Function: - Good tolerance with initiation of AROM and strength training - Good exercise participation -  
 
Patient will continue to benefit from skilled PT services to modify and progress therapeutic interventions, address functional mobility deficits, address ROM deficits, address strength deficits and analyze and address soft tissue restrictions to attain remaining goals. []  See Plan of Care 
[]  See progress note/recertification 
[]  See Discharge Summary Progress towards goals / Updated goals: Short Term Goals: To be accomplished in 5 treatments: 1.  Pt will be compliant and independent with HEP in order to facilitate PT sessions and aid with self management 
                      Eval Status:  Initiated 
                      Current Status: Progressng, pt reports compliance 10/2/18 2.  Pt to tolerate 30 min or more of TE and/or Interventions w/o increased s/s                       Eval Status:  Initiated 
                      CNVSZLA Status: Met at 43 Min 10/2/18 Long Term Goals: To be accomplished in 10 treatments: 1.  Pt will report 50% improvement or better with involvement to show a significant increase in function                       Eval Status:  Initiated 
                      PUCGLCT Status: 2.  Pt will have unforced PROM to 120* Right Shoulder Flx/Abd for progress to AAROM w/o difficulty  
                      Eval Status:  Initiated 
                      PNFZYGF Status: Met with essentially full ROM unforced 10/2/18 3.  Pt will have decreased pain at 3/10 or better with MT for good tolerance allowing improved Right Shoulder mobility                       Eval Status:  5 
                      Current Status: Met at 0/10 10/12/18 4.  Pt will improve FOTO score to 64 in 17 visits to show significant improvement for progress to good right shoulder function                       Eval Status: FOTO 28 
                      Current Status: PLAN [x]  Upgrade activities as tolerated     [x]  Continue plan of care 
[]  Update interventions per flow sheet      
[]  Discharge due to:_ 
[]  Other:_ Candida Maria, PT 10/12/2018  11:13 AM 
 
Future Appointments Date Time Provider Austen Castellon 10/16/2018 4:30 PM Candida Maria, PT [facility]  
10/17/2018 4:00 PM Candida Laner, PT [facility]  
10/19/2018 10:00 AM Candida Laner, PT [facility]  
10/22/2018 4:00 PM Candida Laner, PT [facility]  
10/24/2018 4:00 PM Elian Yin  
 10/26/2018 10:30 AM Yfn Casillas, PT [facility]  
10/26/2018 3:45 PM Chely Mullins MD [facility]  
10/29/2018 3:30 PM Yfn Casillas, PT [facility]  
10/31/2018 4:00 PM Yfn Casillas, PT Toi Wilson

## 2018-10-16 ENCOUNTER — HOSPITAL ENCOUNTER (OUTPATIENT)
Dept: PHYSICAL THERAPY | Age: 43
Discharge: HOME | End: 2018-10-16
Payer: OTHER GOVERNMENT

## 2018-10-16 PROCEDURE — 97110 THERAPEUTIC EXERCISES: CPT

## 2018-10-16 PROCEDURE — 97140 MANUAL THERAPY 1/> REGIONS: CPT

## 2018-10-16 PROCEDURE — 97112 NEUROMUSCULAR REEDUCATION: CPT

## 2018-10-17 ENCOUNTER — HOSPITAL ENCOUNTER (OUTPATIENT)
Dept: PHYSICAL THERAPY | Age: 43
Discharge: HOME | End: 2018-10-17
Payer: OTHER GOVERNMENT

## 2018-10-17 PROCEDURE — 97110 THERAPEUTIC EXERCISES: CPT

## 2018-10-17 PROCEDURE — 97140 MANUAL THERAPY 1/> REGIONS: CPT

## 2018-10-17 PROCEDURE — 97112 NEUROMUSCULAR REEDUCATION: CPT

## 2018-10-17 NOTE — PROGRESS NOTES
PT DAILY TREATMENT NOTE 10-18 Patient Name: Lorraine Warren Date:10/16/2018 : 1975 [x]  Patient  Verified Payor: MARIETTA / Plan: Faith Davila / Product Type: Kristie Imus / In time:4:33 Out time:5:24 Total Treatment Time (min): 51 Visit #: 5 of 10 Treatment Area: Right shoulder pain [M25.511] SUBJECTIVE Pain Level (0-10 scale): 0 Any medication changes, allergies to medications, adverse drug reactions, diagnosis change, or new procedure performed?: [x] No    [] Yes (see summary sheet for update) Subjective functional status/changes:   [] No changes reported Moving the shoulder better, just not very strong OBJECTIVE 33 min Therapeutic Exercise:  [x] See flow sheet :  
Rationale: increase ROM, increase strength and improve coordination to improve the patients ability to move right UE independently 10 min Manual Therapy:  PROM right shoulder flx/ABD/ER, Rationale: decrease pain, increase ROM and increase tissue extensibility to perform increased ADL 
 
8 min Neuromuscular Re-education:  [x]  See flow sheet : AI, PNF D1/D2 F/E Rationale: increase strength  to improve the patients ability to return to usual work tasks With 
 [x] TE 
 [] TA 
 [] neuro 
 [] other: Patient Education: [x] Review HEP [] Progressed/Changed HEP based on:  
[] positioning   [] body mechanics   [] transfers   [] heat/ice application   
[] other:   
 
Other Objective/Functional Measures:  
- AROM Shoulder Flx/ABD WNL Pain Level (0-10 scale) post treatment: 0 
 
ASSESSMENT/Changes in Function: - Good exercise participation - Good tolerance with added reps and exercises Patient will continue to benefit from skilled PT services to modify and progress therapeutic interventions, address functional mobility deficits, address ROM deficits, address strength deficits and analyze and address soft tissue restrictions to attain remaining goals. []  See Plan of Care []  See progress note/recertification 
[]  See Discharge Summary Progress towards goals / Updated goals: 
Short Term Goals: To be accomplished in 5 treatments: 1.  Pt will be compliant and independent with HEP in order to facilitate PT sessions and aid with self management 
                      Eval Status:  Initiated 
                      Current Status: Progressng, pt reports compliance 10/2/18 2.  Pt to tolerate 30 min or more of TE and/or Interventions w/o increased s/s                       Eval Status:  Initiated 
                      IRNGDGA Status: Met at 43 Min 10/2/18 Long Term Goals: To be accomplished in 10 treatments: 1.  Pt will report 50% improvement or better with involvement to show a significant increase in function                       Eval Status:  Initiated 
                      LSOTGUH Status: 2.  Pt will have unforced PROM to 120* Right Shoulder Flx/Abd for progress to AAROM w/o difficulty  
                      Eval Status:  Initiated 
                      GOJSDKI Status: Met with essentially full ROM unforced 10/2/18 3.  Pt will have decreased pain at 3/10 or better with MT for good tolerance allowing improved Right Shoulder mobility                       Eval Status:  5 
                      Current Status: Met at 0/10 10/12/18 4.  Pt will improve FOTO score to 64 in 17 visits to show significant improvement for progress to good right shoulder function                       Eval Status: FOTO 28 
                      Current Status: Progressing at 48 10/16/18 PLAN [x]  Upgrade activities as tolerated     [x]  Continue plan of care 
[]  Update interventions per flow sheet      
[]  Discharge due to:_ 
[]  Other:_ Gwendolynn Babinski, PT 10/16/2018  8:52 PM 
 
Future Appointments Date Time Provider Austen Castellon 10/17/2018 4:00 PM [address] [facility]  
10/22/2018 4:00 PM Elzbieta Sanders  
 10/24/2018 4:00 PM Carole Castañeda, PT [facility]  
10/25/2018 3:30 PM Carole Castañeda, PT [facility]  
10/26/2018 3:45 PM Gaurav Simmons MD [facility]  
10/29/2018 3:30 PM Carole Castañeda, PT [facility]  
10/31/2018 4:00 PM Carole Castañeda, PT [facility]  
11/1/2018 4:00 PM Carole Castañeda, PT [facility]  
11/5/2018 4:00 PM [address] [facility]  
11/7/2018 4:00 PM Carole Castañeda, PT [facility]  
11/8/2018 4:30 PM Jesus Kline

## 2018-10-17 NOTE — PROGRESS NOTES
PT DAILY TREATMENT NOTE 10-18    Patient Name: Scar Langston  Date:10/17/2018  : 1975  [x]  Patient  Verified  Payor: MARIETTA / Plan: Dc Bellamy 72 / Product Type:  /    In time: 3:58 Out time:4:38  Total Treatment Time (min): 40  Visit #: 6 of 10    Treatment Area: Right shoulder pain [M25.511]    SUBJECTIVE  Pain Level (0-10 scale): 0  Any medication changes, allergies to medications, adverse drug reactions, diagnosis change, or new procedure performed?: [x] No    [] Yes (see summary sheet for update)  Subjective functional status/changes:   [] No changes reported  I know it is doing better.   Can reach to put on my seatbelt much easier    OBJECTIVE      24 min Therapeutic Exercise:  [x] See flow sheet :   Rationale: increase ROM, increase strength and improve coordination to improve the patients ability to move right UE independently    8 min Manual Therapy:  PROM right shoulder flx/ABD/ER,    Rationale: decrease pain, increase ROM and increase tissue extensibility to perform increased ADL    8 min Neuromuscular Re-education:  [x]  See flow sheet : AI, PNF D1/D2 F/E   Rationale: increase strength  to improve the patients ability to return to usual work tasks       With   [x] TE   [] TA   [] neuro   [] other: Patient Education: [x] Review HEP    [] Progressed/Changed HEP based on:   [] positioning   [] body mechanics   [] transfers   [] heat/ice application    [] other:      Other Objective/Functional Measures:   - AROM Shoulder Flx/ABD WNL with added pain and clicking     Pain Level (0-10 scale) post treatment: 0    ASSESSMENT/Changes in Function:   - Good exercise participation  - Good tolerance with added reps and exercises per flow sheet  - Improved stability indicated with AI     Patient will continue to benefit from skilled PT services to modify and progress therapeutic interventions, address functional mobility deficits, address ROM deficits, address strength deficits and analyze and address soft tissue restrictions to attain remaining goals.      []  See Plan of Care  []  See progress note/recertification  []  See Discharge Summary          Progress towards goals / Updated goals:  Short Term Goals: To be accomplished in 5 treatments:  1.  Pt will be compliant and independent with HEP in order to facilitate PT sessions and aid with self management                        Eval Status:  Initiated                        Current Status: Progressng, pt reports compliance 10/2/18  2.  Pt to tolerate 30 min or more of TE and/or Interventions w/o increased s/s                        Eval Status:  Initiated                        OWISDUI Status: Met at 43 Min 10/2/18  Long Term Goals: To be accomplished in 10 treatments:  1.  Pt will report 50% improvement or better with involvement to show a significant increase in function                        Eval Status:  Initiated                        Current Status:  2.  Pt will have unforced PROM to 120* Right Shoulder Flx/Abd for progress to AAROM w/o difficulty                         Eval Status:  Initiated                        Current Status: Met with essentially full ROM unforced 10/2/18  3.  Pt will have decreased pain at 3/10 or better with MT for good tolerance allowing improved Right Shoulder mobility                        Eval Status:  5                        Current Status: Met at 0/10 10/17/18  4.  Pt will improve FOTO score to 64 in 17 visits to show significant improvement for progress to good right shoulder function                        Eval Status: FOTO 28                        Current Status: Progressing at 48 10/16/18    PLAN  [x]  Upgrade activities as tolerated     [x]  Continue plan of care  []  Update interventions per flow sheet       []  Discharge due to:_  []  Other:_      Samanta Knox, PT 10/17/2018  8:52 PM    Future Appointments   Date Time Provider Austen Castellon   10/22/2018  4:00 PM Mignon Almaguer, PT [facility]   10/24/2018  4:00 PM Olivia Bowie, [place] [facility]   10/25/2018  3:30 PM Olivia Bowie, PT [facility]   10/26/2018  3:45 PM Wanda Haney MD [facility]   10/29/2018  3:30 PM Olivia Bowie, PT [facility]   10/31/2018  4:00 PM Olivia Bowie, PT [facility]   11/1/2018  4:00 PM Olivia Bowie, PT [facility]   11/5/2018  4:00 PM Olivia Bowie, PT [facility]   11/7/2018  4:00 PM Olivia Bowie, PT [facility]   11/8/2018  4:30 PM Olivia Bowie, PT [facility]

## 2018-10-19 ENCOUNTER — APPOINTMENT (OUTPATIENT)
Dept: PHYSICAL THERAPY | Age: 43
End: 2018-10-19
Payer: OTHER GOVERNMENT

## 2018-10-22 ENCOUNTER — HOSPITAL ENCOUNTER (OUTPATIENT)
Dept: PHYSICAL THERAPY | Age: 43
Discharge: HOME | End: 2018-10-22
Payer: OTHER GOVERNMENT

## 2018-10-22 PROCEDURE — 97112 NEUROMUSCULAR REEDUCATION: CPT

## 2018-10-22 PROCEDURE — 97110 THERAPEUTIC EXERCISES: CPT

## 2018-10-22 PROCEDURE — 97140 MANUAL THERAPY 1/> REGIONS: CPT

## 2018-10-22 NOTE — PROGRESS NOTES
PT DAILY TREATMENT NOTE 10-18 Patient Name: Jennifer Martinez Date:10/22/2018 : 1975 [x]  Patient  Verified Payor: MARIETTA / Plan: Dc Bellamy 72 / Product Type: Yamila Smaller / In time:3:57  Out time:4:58 Total Treatment Time (min): 60 Visit #: 7 of 10 Treatment Area: Right shoulder pain [M25.511] SUBJECTIVE Pain Level (0-10 scale): 0 Any medication changes, allergies to medications, adverse drug reactions, diagnosis change, or new procedure performed?: [x] No    [] Yes (see summary sheet for update) Subjective functional status/changes:   [] No changes reported Moving and reaching better. Feels weak OBJECTIVE 40 min Therapeutic Exercise:  [x] See flow sheet :  
Rationale: increase ROM, increase strength and improve coordination to improve the patients ability to perform increased ADL 
 
10 min Neuromuscular Re-education:  []  See flow sheet :  
Rationale: increase ROM and increase strength  to improve the patients ability to perform usual daily activity 10 min Manual Therapy:  STM/DTM/TPR right UT/Infraspinatus Rationale: decrease pain, increase ROM, increase tissue extensibility and decrease trigger points to improve ability to reach With 
 [x] TE 
 [] TA 
 [] neuro 
 [] other: Patient Education: [x] Review HEP [] Progressed/Changed HEP based on:  
[] positioning   [] body mechanics   [] transfers   [] heat/ice application   
[] other:   
 
Other Objective/Functional Measures:  
- Pt demo full AROM shoulder flx/ABD/Ext 
- ADD limited at 20* Right vs 36* Left Pain Level (0-10 scale) post treatment: 0 
 
ASSESSMENT/Changes in Function: - TTP with muscle tightness right UT/infraspinatus - Good response to strength training with improved tolerance with reaching over head Patient will continue to benefit from skilled PT services to modify and progress therapeutic interventions, address functional mobility deficits, address ROM deficits, address strength deficits, analyze and address soft tissue restrictions and analyze and cue movement patterns to attain remaining goals. []  See Plan of Care 
[]  See progress note/recertification 
[]  See Discharge Summary Progress towards goals / Updated goals: 
Short Term Goals: To be accomplished in 5 treatments: 1.  Pt will be compliant and independent with HEP in order to facilitate PT sessions and aid with self management 
                      Eval Status:  Initiated 
                      Current Status: Progressng, pt reports compliance 10/2/18 2.  Pt to tolerate 30 min or more of TE and/or Interventions w/o increased s/s                       Eval Status:  Initiated 
                      YWPTTLH Status: Met at 43 Min 10/2/18 Long Term Goals: To be accomplished in 10 treatments: 1.  Pt will report 50% improvement or better with involvement to show a significant increase in function                       Eval Status:  Initiated 
                      QHYNPKY Status: 2.  Pt will have unforced PROM to 120* Right Shoulder Flx/Abd for progress to AAROM w/o difficulty  
                      Eval Status:  Initiated 
                      RJHQIVW Status: Met with essentially full ROM unforced 10/2/18 3.  Pt will have decreased pain at 3/10 or better with MT for good tolerance allowing improved Right Shoulder mobility                       Eval Status:  5 
                      Current Status: Met at 0/10 10/12/18 4.  Pt will improve FOTO score to 64 in 17 visits to show significant improvement for progress to good right shoulder function                       Eval Status: FOTO 28 
                      Current Status: Progressing at 48 10/16/18 PLAN [x]  Upgrade activities as tolerated     [x]  Continue plan of care 
[]  Update interventions per flow sheet      
[]  Discharge due to:_ 
[]  Other:_ Samanta Knox, PT 10/22/2018  4:37 PM 
 
 Future Appointments Date Time Provider Austen Castellon 10/24/2018  4:00 PM Binghamchristiano Vanegasing, PT [address]  
10/25/2018  3:30 PM Bingham Guilhermeing, PT [address]  
10/26/2018  3:45 PM Ary Haney MD [facility]  
10/29/2018  3:30 PM Binghamchristiano Vanegasing, PT [address]  
10/31/2018  4:00 PM Bingham Guilhermeing, PT [address]  
11/1/2018  4:00 PM Bingham Moalexandraing, PT [address]  
11/5/2018  4:00 PM Bingham Guilhermeing, PT [address]  
11/7/2018  4:00 PM Bingham Moalexandraing, PT [address]  
11/8/2018  4:30 PM Bingham Guilhermeing, PT [address]

## 2018-10-24 ENCOUNTER — HOSPITAL ENCOUNTER (OUTPATIENT)
Dept: PHYSICAL THERAPY | Age: 43
Discharge: HOME | End: 2018-10-24
Payer: OTHER GOVERNMENT

## 2018-10-24 PROCEDURE — 97110 THERAPEUTIC EXERCISES: CPT

## 2018-10-24 NOTE — PROGRESS NOTES
PT DAILY TREATMENT NOTE 10-18 Patient Name: Arturo Read Date:10/24/2018 : 1975 [x]  Patient  Verified Payor: MARIETTA / Plan: Dc Bellamy 72 / Product Type: Nico Pippa / In time:3:57  Out time:4:38 Total Treatment Time (min): 41 Visit #: 8 of 10 Treatment Area: Right shoulder pain [M25.511] SUBJECTIVE Pain Level (0-10 scale): 0 Any medication changes, allergies to medications, adverse drug reactions, diagnosis change, or new procedure performed?: [x] No    [] Yes (see summary sheet for update) Subjective functional status/changes:   [] No changes reported Moving and reaching better. Feels weak OBJECTIVE 41 min Therapeutic Exercise:  [x] See flow sheet :  
Rationale: increase ROM, increase strength and improve coordination to improve the patients ability to perform increased ADL With 
 [x] TE 
 [] TA 
 [] neuro 
 [] other: Patient Education: [x] Review HEP [] Progressed/Changed HEP based on:  
[] positioning   [] body mechanics   [] transfers   [] heat/ice application   
[] other:   
 
Other Objective/Functional Measures:  
 
Pain Level (0-10 scale) post treatment: 0 
 
ASSESSMENT/Changes in Function: - Good response to strength training with improved tolerance with reaching over head Patient will continue to benefit from skilled PT services to modify and progress therapeutic interventions, address functional mobility deficits, address ROM deficits, address strength deficits, analyze and address soft tissue restrictions and analyze and cue movement patterns to attain remaining goals. []  See Plan of Care 
[]  See progress note/recertification 
[]  See Discharge Summary Progress towards goals / Updated goals: 
Short Term Goals: To be accomplished in 5 treatments: 1.  Pt will be compliant and independent with HEP in order to facilitate PT sessions and aid with self management 
                      Eval Status:  Initiated                       Current Status: Progressng, pt reports compliance 10/2/18 2.  Pt to tolerate 30 min or more of TE and/or Interventions w/o increased s/s                       Eval Status:  Initiated 
                      EBRVDRG Status: Met at 43 Min 10/2/18 Long Term Goals: To be accomplished in 10 treatments: 1.  Pt will report 50% improvement or better with involvement to show a significant increase in function                       Eval Status:  Initiated 
                      QXPTEBE Status: 2.  Pt will have unforced PROM to 120* Right Shoulder Flx/Abd for progress to AAROM w/o difficulty  
                      Eval Status:  Initiated 
                      FWUOYJF Status: Met with essentially full ROM unforced 10/2/18 3.  Pt will have decreased pain at 3/10 or better with MT for good tolerance allowing improved Right Shoulder mobility                       Eval Status:  5 
                      Current Status: Met at 0/10 10/12/18 4.  Pt will improve FOTO score to 64 in 17 visits to show significant improvement for progress to good right shoulder function                       Eval Status: FOTO 28 
                      Current Status: Progressing at 48 10/16/18 PLAN [x]  Upgrade activities as tolerated     [x]  Continue plan of care 
[]  Update interventions per flow sheet      
[]  Discharge due to:_ 
[]  Other:_ Jose Enriquez, PT 10/24/2018  4:37 PM 
 
Future Appointments Date Time Provider Austen Castellon 10/25/2018  3:30 PM Inocente Mai PT [facility]  
10/26/2018  3:45 PM Dayana Mazariegos MD [facility]  
10/29/2018  3:30 PM Inocente Mai PT [facility]  
10/31/2018  4:00 PM Inoecnte Mai PT [facility]  
11/1/2018  4:00 PM Inocente Mai PT [facility]  
11/5/2018  4:00 PM Inocente Mai PT [facility]  
11/7/2018  4:00 PM Inocente Mai, PT [facility]  
11/8/2018  4:30 PM Inocente Mai PT [facility]

## 2018-10-25 ENCOUNTER — HOSPITAL ENCOUNTER (OUTPATIENT)
Dept: PHYSICAL THERAPY | Age: 43
Discharge: HOME | End: 2018-10-25
Payer: OTHER GOVERNMENT

## 2018-10-25 PROCEDURE — 97140 MANUAL THERAPY 1/> REGIONS: CPT

## 2018-10-25 PROCEDURE — 97110 THERAPEUTIC EXERCISES: CPT

## 2018-10-25 NOTE — PROGRESS NOTES
PT DAILY TREATMENT NOTE 10-18 Patient Name: Ambreen Yen Date:10/25/2018 : 1975 [x]  Patient  Verified Payor: MARIETTA / Plan: Dc Bellamy 72 / Product Type: Dewitte Peto / In time:3:14  Out time:3:56 Total Treatment Time (min): 41 Visit #: 9 of 10 Treatment Area: Right shoulder pain [M25.511] SUBJECTIVE Pain Level (0-10 scale): 0 Any medication changes, allergies to medications, adverse drug reactions, diagnosis change, or new procedure performed?: [x] No    [] Yes (see summary sheet for update) Subjective functional status/changes:   [] No changes reported Moving and reaching better. Feels weak OBJECTIVE 31 min Therapeutic Exercise:  [] See flow sheet :  
Rationale: increase ROM, increase strength and improve coordination to improve the patients ability to progress toward return to work 10 min Manual Therapy:  PROM with overstretch, GH jot mobs, LAD  Right UE, Right AC joint mobs Rationale: increase ROM and increase tissue extensibility to perform increased ADL With 
 [x] TE 
 [] TA 
 [] neuro 
 [] other: Patient Education: [x] Review HEP [] Progressed/Changed HEP based on:  
[] positioning   [] body mechanics   [] transfers   [] heat/ice application   
[] other:   
 
Other Objective/Functional Measures: - MMT Right Shoulder Flx 4  ABD 3 Ext 5- ADD 4+ Pain Level (0-10 scale) post treatment: 0 
 
ASSESSMENT/Changes in Function: - Good response to strength training with improved tolerance with reaching over head Patient will continue to benefit from skilled PT services to modify and progress therapeutic interventions, address functional mobility deficits, address ROM deficits, address strength deficits, analyze and address soft tissue restrictions and analyze and cue movement patterns to attain remaining goals. []  See Plan of Care 
[]  See progress note/recertification 
[]  See Discharge Summary Progress towards goals / Updated goals: Short Term Goals: To be accomplished in 5 treatments: 1.  Pt will be compliant and independent with HEP in order to facilitate PT sessions and aid with self management 
                      Eval Status:  Initiated 
                      Current Status: Progressng, pt reports compliance 10/2/18 2.  Pt to tolerate 30 min or more of TE and/or Interventions w/o increased s/s                       Eval Status:  Initiated 
                      OEVLAIM Status: Met at 43 Min 10/2/18 Long Term Goals: To be accomplished in 10 treatments: 1.  Pt will report 50% improvement or better with involvement to show a significant increase in function                       Eval Status:  Initiated 
                      KCAROBO Status: 2.  Pt will have unforced PROM to 120* Right Shoulder Flx/Abd for progress to AAROM w/o difficulty  
                      Eval Status:  Initiated 
                      ADHXVBV Status: Met with essentially full ROM unforced 10/2/18 3.  Pt will have decreased pain at 3/10 or better with MT for good tolerance allowing improved Right Shoulder mobility                       Eval Status:  5 
                      Current Status: Met at 0/10 10/12/18 4.  Pt will improve FOTO score to 64 in 17 visits to show significant improvement for progress to good right shoulder function                       Eval Status: FOTO 28 
                      Current Status: Progressing at 48 10/16/18 PLAN [x]  Upgrade activities as tolerated     [x]  Continue plan of care 
[]  Update interventions per flow sheet      
[]  Discharge due to:_ 
[]  Other:_ Mery Oh, PT 10/25/2018  3:18 PM 
 
Future Appointments Date Time Provider Austen Castellon 10/25/2018  3:30 PM Jose Antonio Roman PT [facility]  
10/26/2018  3:45 PM Brad Moss MD [facility]  
10/29/2018  3:30 PM Jose Antonio Roman PT [facility]  
10/31/2018  4:00 PM Jose Antonio Roman PT [facility]  
 11/1/2018  4:00 PM Rupa Herrera, PT [facility]  
11/5/2018  4:00 PM Rupa Herrera, PT [facility]  
11/7/2018  4:00 PM Rupa Herrera, PT [facility]  
11/8/2018  4:30 PM Rupa Herrera, PT [facility]

## 2018-10-25 NOTE — PROGRESS NOTES
[facility] at [address] Phone: [phone]      Fax:  [phone] Progress Note Plan of Care/ Statement of Necessity for Physical Therapy Services Patient name: Jennifer Martinez Start of Care: 2018 Referral source: Noah Mckeon MD : 1975 Medical Diagnosis: Right shoulder pain [M25.511] 
  Onset Date: Surgery 18 Treatment Diagnosis: Right shoulder weakness and limited mobility Prior Hospitalization: see medical history Provider#: [id number] Medications: Verified on Patient summary List  
 Comorbidities: DM 
 Prior Level of Function: Able to do normal daily activity and work with periodic popping and cracking.  Was always stronger in the right arm Visits from Start of Care: 9    Missed Visits: 0 Established Goals:          Excellent Good         Limited           None 
[] Increased ROM   [x]  []  []  [] 
[] Increased Strength  []  [x]  []  [] 
[] Increased Mobility  []  [x]  []  []  
[] Decreased Pain   []  [x]  []  [] 
[] Decreased Swelling  []  []  []  [] 
 
Key Functional Changes: Patient has shown good progress with this treatment program. Pain as of last visit was 0/10. Patient has shown decreased pain and increased strength and mobility. Patient reports improvement with overall involvement. AROM right shoulder WNL, Strength test at gross 4/15. FOTO score is 48 at last test 10/16/18. All STG/LTGs achieved as identified below. Fall Risk Assessment: Patient demonstrates no Fall Risk Progress towards goals / Updated goals: 
Short Term Goals: To be accomplished in 5 treatments: 1.  Pt will be compliant and independent with HEP in order to facilitate PT sessions and aid with self management 
                      Eval Status:  Initiated 
                      Current Status: Cherring, pt reports compliance 10/2/18 2.  Pt to tolerate 30 min or more of TE and/or Interventions w/o increased s/s                       Eval Status:  Initiated 
                      WBLMXIG Status: Met at 43 Min 10/2/18 Long Term Goals: To be accomplished in 10 treatments: 1.  Pt will report 50% improvement or better with involvement to show a significant increase in function                       Eval Status:  Initiated 
                      DCXANFC Status: 2.  Pt will have unforced PROM to 120* Right Shoulder Flx/Abd for progress to AAROM w/o difficulty  
                      Eval Status:  Initiated 
                      ZJOWATF Status: Met with essentially full ROM unforced 10/2/18 3.  Pt will have decreased pain at 3/10 or better with MT for good tolerance allowing improved Right Shoulder mobility                       Eval Status:  5 
                      Current Status: Met at 0/10 10/25/18 4.  Pt will improve FOTO score to 64 in 17 visits to show significant improvement for progress to good right shoulder function                       Eval Status: FOTO 28 
                      Current Status: Progressing at 48 10/16/18 Updated Goals: to be achieved in 10 treatments: 1.  Pt will report 50% improvement or better with involvement to show a significant increase in function PN Status: N/A 
                      Current Status: 2.  Pt will have MMT right shoulder gross 4+/5 to return to usual work tasks PN Status: MMT Right Shoulder Flx 4  ABD 3 Ext 5- ADD 4+ 
                      Current Status: 3.  Pt will tolerate reaching to shoulder level with 10# or more to return to working at shoulder level with normal work duties PN Status: Difficulty sustaining work at shoulder level                       Current Status: Met at 0/10 10/25/18 4.  Pt will improve FOTO score to 64 in 17 visits to show significant improvement for progress to good right shoulder function PN Status: FOTO 48 
                       Current Status:  
 
ASSESSMENT/RECOMMENDATIONS: 
[x]Continue therapy per initial plan/protocol at a frequency of  2-3 x per week for 10 visits []Continue therapy with the following recommended changes:_____________________      _____________________________________________________________________ []Discontinue therapy progressing towards or have reached established goals []Discontinue therapy due to lack of appreciable progress towards goals []Discontinue therapy due to lack of attendance or compliance []Await Physician's recommendations/decisions regarding therapy []Other:________________________________________________________________ Thank you for this referral.  
Clarissa Gao, PT 10/25/2018 7:14 PM 
NOTE TO PHYSICIAN:  PLEASE COMPLETE THE ORDERS BELOW AND  
FAX TO [facility]: [phone] If you are unable to process this request in 24 hours please contact our office:  
[phone] []  I have read the above report and request that my patient continue as recommended. []  I have read the above report and request that my patient continue therapy with the following changes/special instructions:________________________________________ []I have read the above report and request that my patient be discharged from therapy.  
 
[de-identified] Signature:____________Date:_________TIME:________ 
 
[employer], Date and Time must be completed for valid certification **

## 2018-10-26 ENCOUNTER — APPOINTMENT (OUTPATIENT)
Dept: PHYSICAL THERAPY | Age: 43
End: 2018-10-26
Payer: OTHER GOVERNMENT

## 2018-10-26 ENCOUNTER — OFFICE VISIT (OUTPATIENT)
Dept: ORTHOPEDIC SURGERY | Facility: CLINIC | Age: 43
End: 2018-10-26

## 2018-10-26 VITALS
BODY MASS INDEX: 40.43 KG/M2 | SYSTOLIC BLOOD PRESSURE: 141 MMHG | RESPIRATION RATE: 16 BRPM | HEIGHT: 74 IN | HEART RATE: 81 BPM | TEMPERATURE: 98 F | WEIGHT: 315 LBS | DIASTOLIC BLOOD PRESSURE: 87 MMHG | OXYGEN SATURATION: 97 %

## 2018-10-26 DIAGNOSIS — M75.121 COMPLETE TEAR OF RIGHT ROTATOR CUFF: Primary | ICD-10-CM
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
## 2018-10-26 NOTE — LETTER
NOTIFICATION RETURN TO WORK / SCHOOL 
 
10/26/2018 3:25 PM 
 
Mr. Yolande Kuhn [address] [place] [id number] To Whom It May Concern: 
 
Yolande Kuhn is currently under the care of [address] [place] [address]. He will return to work/school on: 10/29/18 Continue with light duty restrictions at work for the right arm for another 3 months from today. If there are questions or concerns please have the patient contact our office.  
 
 
 
Sincerely, 
 
 
Halley Nelson MD

## 2018-10-29 ENCOUNTER — HOSPITAL ENCOUNTER (OUTPATIENT)
Dept: PHYSICAL THERAPY | Age: 43
Discharge: HOME | End: 2018-10-29
Payer: OTHER GOVERNMENT

## 2018-10-29 PROCEDURE — 97112 NEUROMUSCULAR REEDUCATION: CPT

## 2018-10-29 PROCEDURE — 97110 THERAPEUTIC EXERCISES: CPT

## 2018-10-29 NOTE — PROGRESS NOTES
PT DAILY TREATMENT NOTE 10-18 Patient Name: Kenrick Lopez Date:10/29/2018 : 1975 [x]  Patient  Verified Payor: MARIETTA / Plan: Palmira Fulton / Product Type: Urmila Tomi / In time:3:30  Out time:4:23 Total Treatment Time (min): 53 Visit #: 1 of 10 Treatment Area: Right shoulder pain [M25.511] SUBJECTIVE Pain Level (0-10 scale): 0 Any medication changes, allergies to medications, adverse drug reactions, diagnosis change, or new procedure performed?: [x] No    [] Yes (see summary sheet for update) Subjective functional status/changes:   [] No changes reported Doing good, no pain OBJECTIVE 43 min Therapeutic Exercise:  [x] See flow sheet :  
Rationale: increase ROM, increase strength and improve coordination to improve the patients ability to utilize right UE normally 10 min Neuromuscular Re-education:  []  See flow sheet : AI, D1/D2 Flx/Ext Rationale: increase ROM, increase strength and improve coordination  to improve the patients ability to stabilize with work at shoulder level With 
 [x] TE 
 [] TA [x] neuro 
 [] other: Patient Education: [x] Review HEP [] Progressed/Changed HEP based on:  
[] positioning   [] body mechanics   [] transfers   [] heat/ice application   
[] other:   
 
Other Objective/Functional Measures:  
  
 
Pain Level (0-10 scale) post treatment: 0 
 
ASSESSMENT/Changes in Function: - Increased weight and reps with good tolerance - Pt showing increased strength with resisted training Patient will continue to benefit from skilled PT services to modify and progress therapeutic interventions, address functional mobility deficits, address ROM deficits, address strength deficits, analyze and address soft tissue restrictions and analyze and cue movement patterns to attain remaining goals. []  See Plan of Care 
[]  See progress note/recertification 
[]  See Discharge Summary Progress towards goals / Updated goals: Updated Goals: to be achieved in 10 treatments: 1.  Pt will report 50% improvement or better with involvement to show a significant increase in function PN Status: N/A 
                      Current Status: 2.  Pt will have MMT right shoulder gross 4+/5 to return to usual work tasks PN Status: MMT Right Shoulder Flx 4  ABD 3 Ext 5- ADD 4+ 
                      Current Status: 3.  Pt will tolerate reaching to shoulder level with 10# or more to return to working at shoulder level with normal work duties PN Status: Difficulty sustaining work at shoulder level                       Current Status: Progressing at 2# today with shoulder Flx exercise 10/29/18 4.  Pt will improve FOTO score to 64 in 17 visits to show significant improvement for progress to good right shoulder function PN Status: FOTO 48 
                      Current Status:  
 
 
 
PLAN [x]  Upgrade activities as tolerated     [x]  Continue plan of care 
[]  Update interventions per flow sheet      
[]  Discharge due to:_ 
[]  Other:_ Monique Henson, PT 10/29/2018  4:31 PM 
 
Future Appointments Date Time Provider Austen Castellon 10/31/2018  4:00 PM Brynn Chu, PT [facility]  
11/1/2018  4:00 PM Brynn Chu, PT [facility]  
11/5/2018  4:00 PM Brynn Chu, PT [facility]  
11/7/2018  4:00 PM Brynn Chu PT [facility]  
11/8/2018  4:30 PM Brynn Chu PT [facility]  
12/7/2018  3:30 PM Vel [facility]

## 2018-10-31 ENCOUNTER — APPOINTMENT (OUTPATIENT)
Dept: PHYSICAL THERAPY | Age: 43
End: 2018-10-31
Payer: OTHER GOVERNMENT

## 2018-11-01 ENCOUNTER — HOSPITAL ENCOUNTER (OUTPATIENT)
Dept: PHYSICAL THERAPY | Age: 43
Discharge: HOME | End: 2018-11-01
Payer: OTHER GOVERNMENT

## 2018-11-01 PROCEDURE — 97110 THERAPEUTIC EXERCISES: CPT

## 2018-11-01 NOTE — PROGRESS NOTES
PT DAILY TREATMENT NOTE 10-18    Patient Name: Scar Langston  Date:2018  : 1975  [x]  Patient  Verified  Payor: MARIETTA / Plan: Josette Mejias / Product Type:  /    In time:3:43  Out time:4:37  Total Treatment Time (min): 54  Visit #: 1 of 10    Treatment Area: Right shoulder pain [M25.511]    SUBJECTIVE  Pain Level (0-10 scale): 0  Any medication changes, allergies to medications, adverse drug reactions, diagnosis change, or new procedure performed?: [x] No    [] Yes (see summary sheet for update)  Subjective functional status/changes:   [] No changes reported  Doing great. OBJECTIVE      54 min Therapeutic Exercise:  [x] See flow sheet :   Rationale: increase ROM, increase strength and improve coordination to improve the patients ability to return to usual activity     With   [x] TE   [] TA   [] neuro   [] other: Patient Education: [x] Review HEP    [] Progressed/Changed HEP based on:   [] positioning   [] body mechanics   [] transfers   [] heat/ice application    [] other:      Other Objective/Functional Measures:  - MMT Right Shoulder Flx/ADD 4+, Ext 5, ABD 3+, ER/IR 3+-4       Pain Level (0-10 scale) post treatment: 0    ASSESSMENT/Changes in Function:   - Good mobility and good response to strength training    Patient will continue to benefit from skilled PT services to modify and progress therapeutic interventions, address functional mobility deficits, address ROM deficits, address strength deficits, analyze and address soft tissue restrictions and analyze and cue movement patterns to attain remaining goals.      []  See Plan of Care  []  See progress note/recertification  []  See Discharge Summary         Progress towards goals / Updated goals:  Short Term Goals: To be accomplished in 5 treatments:  1.  Pt will be compliant and independent with HEP in order to facilitate PT sessions and aid with self management                        Eval Status:  Initiated                        VAFOBAD Status: Progressng, pt reports compliance 10/2/18  2.  Pt to tolerate 30 min or more of TE and/or Interventions w/o increased s/s                        Eval Status:  Initiated                        VRNIVTU Status: Met at 43 Min 10/2/18  Long Term Goals: To be accomplished in 10 treatments:  1.  Pt will report 50% improvement or better with involvement to show a significant increase in function                        Eval Status:  Initiated                        Current Status:  2.  Pt will have unforced PROM to 120* Right Shoulder Flx/Abd for progress to AAROM w/o difficulty                         Eval Status:  Initiated                        Current Status: Met with essentially full ROM unforced 10/2/18  3.  Pt will have decreased pain at 3/10 or better with MT for good tolerance allowing improved Right Shoulder mobility                        Eval Status:  5                        Current Status: Met at 0/10 10/17/18  4.  Pt will improve FOTO score to 64 in 17 visits to show significant improvement for progress to good right shoulder function                        Eval Status: FOTO 28                        Current Status: Progressing at 48 10/16/18           PLAN  [x]  Upgrade activities as tolerated     [x]  Continue plan of care  []  Update interventions per flow sheet       []  Discharge due to:_  []  Other:_      Prakash Park, PT 11/1/2018  4:27 PM    Future Appointments   Date Time Provider Austen Castellon   11/5/2018  4:00 PM Garry Daugherty [facility]   11/7/2018  4:00 PM Radha Shoemaker PT [facility]   11/8/2018  4:30 PM Radha Shoemaker PT [facility]   11/12/2018  4:00 PM Radha Shoemaker PT [facility]   12/7/2018  3:30 PM Jayleen Haney MD [place] [id number]

## 2018-11-05 ENCOUNTER — HOSPITAL ENCOUNTER (OUTPATIENT)
Dept: PHYSICAL THERAPY | Age: 43
Discharge: HOME | End: 2018-11-05
Payer: OTHER GOVERNMENT

## 2018-11-05 PROCEDURE — 97110 THERAPEUTIC EXERCISES: CPT

## 2018-11-05 NOTE — PROGRESS NOTES
PT DAILY TREATMENT NOTE 10-18    Patient Name: Tracy Zimmerman  Date:2018  : 1975  [x]  Patient  Verified  Payor:  / Plan: Dc Bellamy 72 / Product Type:  /    In time:3:50  Out time:4:45  Total Treatment Time (min): 55  Visit #: 2 of 10    Treatment Area: Right shoulder pain [M25.511]    SUBJECTIVE  Pain Level (0-10 scale): 0  Any medication changes, allergies to medications, adverse drug reactions, diagnosis change, or new procedure performed?: [x] No    [] Yes (see summary sheet for update)  Subjective functional status/changes:   [] No changes reported  Doing good. No pain or difficulty    OBJECTIVE      55 min Therapeutic Exercise:  [x] See flow sheet :   Rationale: increase ROM, increase strength and improve coordination to improve the patients ability to return to usual activity     With   [x] TE   [] TA   [] neuro   [] other: Patient Education: [x] Review HEP    [] Progressed/Changed HEP based on:   [] positioning   [] body mechanics   [] transfers   [] heat/ice application    [] other:      Other Objective/Functional Measures:       Pain Level (0-10 scale) post treatment: 0    ASSESSMENT/Changes in Function:   - Good response to treatment program    Patient will continue to benefit from skilled PT services to modify and progress therapeutic interventions, address functional mobility deficits, address ROM deficits, address strength deficits, analyze and address soft tissue restrictions and analyze and cue movement patterns to attain remaining goals.      []  See Plan of Care  []  See progress note/recertification  []  See Discharge Summary         Progress towards goals / Updated goals:  Updated Goals: to be achieved in 10 treatments:  1.  Pt will report 50% improvement or better with involvement to show a significant increase in function  PN Status: N/A                        Current Status:  2.  Pt will have MMT right shoulder gross 4+/5 to return to usual work tasks  PN Status: MMT Right Shoulder Flx 4  ABD 3 Ext 5- ADD 4+                        Current Status:   3.  Pt will tolerate reaching to shoulder level with 10# or more to return to working at shoulder level with normal work duties  PN Status: Difficulty sustaining work at shoulder level                        Current Status: Progressing at 2# today with shoulder Flx exercise 10/29/18  4.  Pt will improve FOTO score to 64 in 17 visits to show significant improvement for progress to good right shoulder function  PN Status: FOTO 48                        Current Status: Progressing at 65 11/1/18                  PLAN  [x]  Upgrade activities as tolerated     [x]  Continue plan of care  []  Update interventions per flow sheet       []  Discharge due to:_  []  Other:_      Mery Due, PT 11/5/2018  4:27 PM    Future Appointments   Date Time Provider Austen Castellon   11/7/2018  4:00 PM Jose Antonio Roman, [place] [facility]   11/8/2018  4:30 PM Jose Antonio Roman PT [facility]   11/12/2018  4:00 PM Jose Antonio Roman PT [facility]   12/7/2018  3:30 PM Brad Haney MD [address]

## 2018-11-07 ENCOUNTER — APPOINTMENT (OUTPATIENT)
Dept: PHYSICAL THERAPY | Age: 43
End: 2018-11-07
Payer: OTHER GOVERNMENT

## 2018-11-08 ENCOUNTER — HOSPITAL ENCOUNTER (OUTPATIENT)
Dept: PHYSICAL THERAPY | Age: 43
Discharge: HOME | End: 2018-11-08
Payer: OTHER GOVERNMENT

## 2018-11-08 PROCEDURE — 97110 THERAPEUTIC EXERCISES: CPT

## 2018-11-08 PROCEDURE — 97112 NEUROMUSCULAR REEDUCATION: CPT

## 2018-11-08 NOTE — PROGRESS NOTES
PT DAILY TREATMENT NOTE 10-18    Patient Name: Mary Rodriguez  Date:2018  : 1975  [x]  Patient  Verified  Payor: [place] / Plan: Jonathon Lozoya / Product Type:  /    In time:4:26  Out time:5:14  Total Treatment Time (min): 48  Visit #: 3 of 10    Treatment Area: Right shoulder pain [M25.511]    SUBJECTIVE  Pain Level (0-10 scale): 0  Any medication changes, allergies to medications, adverse drug reactions, diagnosis change, or new procedure performed?: [x] No    [] Yes (see summary sheet for update)  Subjective functional status/changes:   [] No changes reported  Everything is going good    OBJECTIVE      38 min Therapeutic Exercise:  [x] See flow sheet :   Rationale: increase ROM, increase strength and improve coordination to improve the patients ability to return to usual activity  10 min Neuromuscular Re-education:  [x]  See flow sheet :   Rationale: increase strength and improve coordination  to improve the patients ability to move shoulder with normal symmetry     With   [x] TE   [] TA   [] neuro   [] other: Patient Education: [x] Review HEP    [] Progressed/Changed HEP based on:   [] positioning   [] body mechanics   [] transfers   [] heat/ice application    [] other:      Other Objective/Functional Measures:       Pain Level (0-10 scale) post treatment: 0    ASSESSMENT/Changes in Function:   - Good response to treatment program    Patient will continue to benefit from skilled PT services to modify and progress therapeutic interventions, address functional mobility deficits, address ROM deficits, address strength deficits, analyze and address soft tissue restrictions and analyze and cue movement patterns to attain remaining goals.      []  See Plan of Care  []  See progress note/recertification  []  See Discharge Summary         Progress towards goals / Updated goals:  Updated Goals: to be achieved in 10 treatments:  1.  Pt will report 50% improvement or better with involvement to show a significant increase in function  PN Status: N/A                        Current Status:  2.  Pt will have MMT right shoulder gross 4+/5 to return to usual work tasks  PN Status: MMT Right Shoulder Flx 4  ABD 3 Ext 5- ADD 4+                        Current Status: ABD3+ today 11/8/18  3.  Pt will tolerate reaching to shoulder level with 10# or more to return to working at shoulder level with normal work duties  PN Status: Difficulty sustaining work at shoulder level                        Current Status: Progressing at 2# today with shoulder Flx exercise 10/29/18  4.  Pt will improve FOTO score to 64 in 17 visits to show significant improvement for progress to good right shoulder function  PN Status: FOTO 48                        Current Status: Progressing at 65 11/1/18                  PLAN  [x]  Upgrade activities as tolerated     [x]  Continue plan of care  []  Update interventions per flow sheet       []  Discharge due to:_  []  Other:_      Yannick Donald, PT 11/8/2018  6:21 PM    Future Appointments   Date Time Provider Austen Castellon   11/12/2018  4:00 PM Bassam Stanley [facility]   12/7/2018  3:30 PM Taiwo Haney MD [address]

## 2018-11-12 ENCOUNTER — HOSPITAL ENCOUNTER (OUTPATIENT)
Dept: PHYSICAL THERAPY | Age: 43
Discharge: HOME | End: 2018-11-12
Payer: OTHER GOVERNMENT

## 2018-11-12 PROCEDURE — 97110 THERAPEUTIC EXERCISES: CPT

## 2018-11-12 PROCEDURE — 97112 NEUROMUSCULAR REEDUCATION: CPT

## 2018-11-12 NOTE — PROGRESS NOTES
PT DAILY TREATMENT NOTE 10-18    Patient Name: Ronald Rodriguez  Date:2018  : 1975  [x]  Patient  Verified  Payor: MARIETTA / Plan: Dc Bellamy 72 / Product Type:  /    In time:3:54  Out time: 4:52  Total Treatment Time (min): 58  Visit #: 3 of 10    Treatment Area: Pain in right shoulder [M25.511]    SUBJECTIVE  Pain Level (0-10 scale): 0  Any medication changes, allergies to medications, adverse drug reactions, diagnosis change, or new procedure performed?: [x] No    [] Yes (see summary sheet for update)  Subjective functional status/changes:   [] No changes reported  Everything is going good. Not having as much of a problem with sleeping. OBJECTIVE      44 min Therapeutic Exercise:  [x] See flow sheet :   Rationale: increase ROM, increase strength and improve coordination to improve the patients ability to return to usual activity  14 min Neuromuscular Re-education:  [x]  See flow sheet :   Rationale: increase strength and improve coordination  to improve the patients ability to move shoulder with normal symmetry     With   [x] TE   [] TA   [] neuro   [] other: Patient Education: [x] Review HEP    [] Progressed/Changed HEP based on:   [] positioning   [] body mechanics   [] transfers   [] heat/ice application    [] other:      Other Objective/Functional Measures:       Pain Level (0-10 scale) post treatment: 0    ASSESSMENT/Changes in Function:   - Good tolerance with use of resistance with UT exercises  - Good response to treatment program    Patient will continue to benefit from skilled PT services to modify and progress therapeutic interventions, address functional mobility deficits, address ROM deficits, address strength deficits, analyze and address soft tissue restrictions and analyze and cue movement patterns to attain remaining goals.      []  See Plan of Care  []  See progress note/recertification  []  See Discharge Summary         Progress towards goals / Updated goals:  Updated Goals: to be achieved in 10 treatments:  1.  Pt will report 50% improvement or better with involvement to show a significant increase in function  PN Status: N/A                        Current Status:  2.  Pt will have MMT right shoulder gross 4+/5 to return to usual work tasks  PN Status: MMT Right Shoulder Flx 4  ABD 3 Ext 5- ADD 4+                        Current Status: ABD3+ today 11/8/18  3.  Pt will tolerate reaching to shoulder level with 10# or more to return to working at shoulder level with normal work duties  PN Status: Difficulty sustaining work at shoulder level                        Current Status: Progressing at 3-5# today time 10 reps reaching above shoulder 11/12/18  4.  Pt will improve FOTO score to 64 in 17 visits to show significant improvement for progress to good right shoulder function  PN Status: FOTO 48                        Current Status: Progressing at 65 11/1/18                  PLAN  [x]  Upgrade activities as tolerated     [x]  Continue plan of care  []  Update interventions per flow sheet       []  Discharge due to:_  []  Other:_      Brandi Pritchett, PT 11/12/2018  4:04 PM    Future Appointments   Date Time Provider Austen Castellon   11/14/2018  4:00 PM Samantha Benton, PT [facility]   11[facility]20/2018  5:00 PM Samantha Benton, PT [facility]   11/28/2018  4:00 PM Samantha Benton, PT [facility]   11/29/2018  4:00 PM Samantha Benton, PT [facility]   12/4/2018  4:00 PM Samantha Benton, PT [facility]   12/6/2018  4:00 PM Samantha Benton, PT [facility]   12/7/2018  3:30 PM Jennifer Haney MD [place] [id number]

## 2018-11-14 ENCOUNTER — HOSPITAL ENCOUNTER (OUTPATIENT)
Dept: PHYSICAL THERAPY | Age: 43
Discharge: HOME | End: 2018-11-14
Payer: OTHER GOVERNMENT

## 2018-11-14 PROCEDURE — 97112 NEUROMUSCULAR REEDUCATION: CPT

## 2018-11-14 PROCEDURE — 97110 THERAPEUTIC EXERCISES: CPT

## 2018-11-14 NOTE — PROGRESS NOTES
PT DAILY TREATMENT NOTE 10-18    Patient Name: Lorraine Warren  Date:2018  : 1975  [x]  Patient  Verified  Payor:  / Plan: Dc Bellamy 72 / Product Type:  /    In time:3:58  Out time: 4:51  Total Treatment Time (min): 53  Visit #: 5 of 10    Treatment Area: Right shoulder pain [M25.511]    SUBJECTIVE  Pain Level (0-10 scale): 0  Any medication changes, allergies to medications, adverse drug reactions, diagnosis change, or new procedure performed?: [x] No    [] Yes (see summary sheet for update)  Subjective functional status/changes:   [] No changes reported  Getting stronger. I think that I can go up in weight    OBJECTIVE      40 min Therapeutic Exercise:  [x] See flow sheet :   Rationale: increase ROM, increase strength and improve coordination to improve the patients ability to return to usual activity  13 min Neuromuscular Re-education:  [x]  See flow sheet :   Rationale: increase strength and improve coordination  to improve the patients ability to move shoulder with normal symmetry     With   [x] TE   [] TA   [] neuro   [] other: Patient Education: [x] Review HEP    [] Progressed/Changed HEP based on:   [] positioning   [] body mechanics   [] transfers   [] heat/ice application    [] other:      Other Objective/Functional Measures:       Pain Level (0-10 scale) post treatment: 0    ASSESSMENT/Changes in Function:   - Good tolerance with use of resistance with UT exercises  - Good response to treatment program    Patient will continue to benefit from skilled PT services to modify and progress therapeutic interventions, address functional mobility deficits, address ROM deficits, address strength deficits, analyze and address soft tissue restrictions and analyze and cue movement patterns to attain remaining goals.      []  See Plan of Care  []  See progress note/recertification  []  See Discharge Summary         Progress towards goals / Updated goals:  Updated Goals: to be achieved in 10 treatments:  1.  Pt will report 50% improvement or better with involvement to show a significant increase in function  PN Status: N/A                        Current Status:  2.  Pt will have MMT right shoulder gross 4+/5 to return to usual work tasks  PN Status: MMT Right Shoulder Flx 4  ABD 3 Ext 5- ADD 4+                        Current Status: ABD3+ today 11/8/18  3.  Pt will tolerate reaching to shoulder level with 10# or more to return to working at shoulder level with normal work duties  PN Status: Difficulty sustaining work at shoulder level                        Current Status: Progressing at 3-5# today time 10 reps reaching above shoulder 11/12/18  4.  Pt will improve FOTO score to 64 in 17 visits to show significant improvement for progress to good right shoulder function  PN Status: FOTO 48                        Current Status: Progressing at 65 11/1/18                  PLAN  [x]  Upgrade activities as tolerated     [x]  Continue plan of care  []  Update interventions per flow sheet       []  Discharge due to:_  []  Other:_      Vasu Rojas, PT 11/14/2018  6:12 PM    Future Appointments   Date Time Provider Austen Castellon   11/20/2018  5:00 PM Elmer Jamison, PT [facility]   11/28/2018  4:00 PM Elmer Jamison, PT [facility]   11/29/2018  4:00 PM Elmer Jamison, PT [facility]   12/4/2018  4:00 PM Elmer Jamison, PT [facility]   12/6/2018  4:00 PM Elmer Jamison, PT [facility]   12/7/2018  3:30 PM Seth Haney MD [address]

## 2018-11-20 ENCOUNTER — HOSPITAL ENCOUNTER (OUTPATIENT)
Dept: PHYSICAL THERAPY | Age: 43
Discharge: HOME | End: 2018-11-20
Payer: OTHER GOVERNMENT

## 2018-11-20 PROCEDURE — 97112 NEUROMUSCULAR REEDUCATION: CPT

## 2018-11-20 PROCEDURE — 97110 THERAPEUTIC EXERCISES: CPT

## 2018-11-20 NOTE — PROGRESS NOTES
PT DAILY TREATMENT NOTE 10-18    Patient Name: Kelley Deshpande  Date:2018  : 1975  [x]  Patient  Verified  Payor:  / Plan: Ernestine Carrasco / Product Type:  /    In time:4:47  Out time: 4:57  Total Treatment Time (min): 60  Visit #: 6 of 10    Treatment Area: Right shoulder pain [M25.511]    SUBJECTIVE  Pain Level (0-10 scale): 0  Any medication changes, allergies to medications, adverse drug reactions, diagnosis change, or new procedure performed?: [x] No    [] Yes (see summary sheet for update)  Subjective functional status/changes:   [] No changes reported  Feeling pretty good. 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    OBJECTIVE      45 min Therapeutic Exercise:  [x] See flow sheet :   Rationale: increase ROM, increase strength and improve coordination to improve the patients ability to return to usual activity  15 min Neuromuscular Re-education:  [x]  See flow sheet :   Rationale: increase strength and improve coordination  to improve the patients ability to move shoulder with normal symmetry     With   [x] TE   [] TA   [] neuro   [] other: Patient Education: [x] Review HEP    [] Progressed/Changed HEP based on:   [] positioning   [] body mechanics   [] transfers   [] heat/ice application    [] other:      Other Objective/Functional Measures:  - Pt demo reach overhead with 10 # right UE w/o difficulty  - FOTO improved to 90       Pain Level (0-10 scale) post treatment: 0    ASSESSMENT/Changes in Function:   - Good tolerance with use of resistance with UT exercises  - Good response to treatment program    Patient will continue to benefit from skilled PT services to modify and progress therapeutic interventions, address functional mobility deficits, address ROM deficits, address strength deficits, analyze and address soft tissue restrictions and analyze and cue movement patterns to attain remaining goals.      []  See Plan of Care  [] See progress note/recertification  []  See Discharge Summary         Progress towards goals / Updated goals:  Updated Goals: to be achieved in 10 treatments:  1.  Pt will report 50% improvement or better with involvement to show a significant increase in function  PN Status: N/A                        Current Status:  2.  Pt will have MMT right shoulder gross 4+/5 to return to usual work tasks  PN Status: MMT Right Shoulder Flx 4  ABD 3 Ext 5- ADD 4+                        Current Status: ABD3+ today 11/8/18  3.  Pt will tolerate reaching to shoulder level with 10# or more to return to working at shoulder level with normal work duties  PN Status: Difficulty sustaining work at shoulder level                        Current Status: Progressing at 3-6# today times 10 reps reaching above shoulder 11/20/18  4.  Pt will improve FOTO score to 64 in 17 visits to show significant improvement for progress to good right shoulder function  PN Status: FOTO 48                        Current Status: Progressing at 65 11/1/18    PLAN  [x]  Upgrade activities as tolerated     [x]  Continue plan of care  []  Update interventions per flow sheet       []  Discharge due to:_  []  Other:_      Victor Hugo Montoya, PT 11/20/2018  6:46PM    Future Appointments   Date Time Provider Austen Castellon   11/28/2018  4:00 PM Sandra Haney, [place] [facility]   11/29/2018  4:00 PM Sandra Haney, PT [facility]   12/4/2018  4:00 PM Sandra Haney, PT [facility]   12/6/2018  4:00 PM Sandra Haney, PT [facility]   12/7/2018  3:30 PM [facility]

## 2018-11-28 ENCOUNTER — HOSPITAL ENCOUNTER (OUTPATIENT)
Dept: PHYSICAL THERAPY | Age: 43
Discharge: HOME | End: 2018-11-28
Payer: OTHER GOVERNMENT

## 2018-11-28 PROCEDURE — 97110 THERAPEUTIC EXERCISES: CPT

## 2018-11-28 NOTE — PROGRESS NOTES
PT DAILY TREATMENT NOTE 10-18    Patient Name: Saima Dumas  Date:2018  : 1975  [x]  Patient  Verified  Payor:  / Plan: [facility]  [facility] / Product Type:  /    In time:3:50  Out time: 4:38  Total Treatment Time (min): 48  Visit #: 7 of 10    Treatment Area: Right shoulder pain [M25.511]    SUBJECTIVE  Pain Level (0-10 scale): 0  Any medication changes, allergies to medications, adverse drug reactions, diagnosis change, or new procedure performed?: [x] No    [] Yes (see summary sheet for update)  Subjective functional status/changes:   [] No changes reported  Doing good, no trouble with daily tasks. Able to do m    OBJECTIVE      48 min Therapeutic Exercise:  [x] See flow sheet :   Rationale: increase ROM, increase strength and improve coordination to improve the patients ability to return to usual activity     With   [x] TE   [] TA   [] neuro   [] other: Patient Education: [x] Review HEP    [] Progressed/Changed HEP based on:   [] positioning   [] body mechanics   [] transfers   [] heat/ice application    [] other:      Other Objective/Functional Measures:  - Good exercise particiation       Pain Level (0-10 scale) post treatment: 0    ASSESSMENT/Changes in Function:   - Good tolerance to added resisted program    Patient will continue to benefit from skilled PT services to modify and progress therapeutic interventions, address functional mobility deficits, address ROM deficits, address strength deficits, analyze and address soft tissue restrictions and analyze and cue movement patterns to attain remaining goals.      []  See Plan of Care  []  See progress note/recertification  []  See Discharge Summary         Progress towards goals / Updated goals:  Updated Goals: to be achieved in 10 treatments:  1.  Pt will report 50% improvement or better with involvement to show a significant increase in function  PN Status: N/A                        Current Status:  2.  Pt will have MMT right shoulder gross 4+/5 to return to usual work tasks  PN Status: MMT Right Shoulder Flx 4  ABD 3 Ext 5- ADD 4+                        Current Status: ABD3+ today 11/8/18  3.  Pt will tolerate reaching to shoulder level with 10# or more to return to working at shoulder level with normal work duties  PN Status: Difficulty sustaining work at shoulder level                        Current Status: Progressing at 3-6# today times 20 reps reaching above shoulder 11/28/18  4.  Pt will improve FOTO score to 64 in 17 visits to show significant improvement for progress to good right shoulder function  PN Status: FOTO 48                        Current Status: Progressing at 90 11/20/18    PLAN  [x]  Upgrade activities as tolerated     [x]  Continue plan of care  []  Update interventions per flow sheet       []  Discharge due to:_  []  Other:_      Mick Chiang, PT 11/28/2018  6:46PM    Future Appointments   Date Time Provider Austen Castellon   11/29/2018  4:00 PM Rosalie Marshall, [place] [facility]   12/4/2018  4:00 PM Rosalie Marshall, PT [facility]   12/6/2018  4:00 PM Rosalie Marshall PT [facility]   12/7/2018  3:30 PM Duane Haney MD [address]

## 2018-11-28 NOTE — PROGRESS NOTES
[facility] at [address] [facility]  [address]  Phone: [phone]      Fax:  [phone]    Progress Note  Plan of Care/ Statement of Necessity for Physical Therapy Services  Patient name: Daxa Aguayo Start of Care: 2018   Referral source: Angie Johnston MD : 1975               Medical Diagnosis: Right shoulder pain [M25.511]    Onset Date: Surgery 18               Treatment Diagnosis: Right shoulder weakness and limited mobility   Prior Hospitalization: see medical history Provider#: [id number]   Medications: Verified on Patient summary List    Comorbidities: DM   Prior Level of Function: Able to do normal daily activity and work with periodic popping and cracking.  Was always stronger in the right arm      Visits from Start of Care: 16    Missed Visits: 0    Established Goals:          Excellent Good         Limited           None  [] Increased ROM   [x]  []  []  []  [] Increased Strength  []  [x]  []  []  [] Increased Mobility  []  [x]  []  []   [] Decreased Pain   []  [x]  []  []  [] Decreased Swelling  []  []  []  []    Key Functional Changes: Patient has shown good progress with this treatment program. Pain as of last visit was 0/10. Patient has shown decreased pain and increased strength and mobility. Patient reports continued improvement with overall involvement with the ability to return to work and perform most tasks. AROM right shoulder WNL, Strength test at gross 4/5 , with limitations primarily in right shulder flexion and Abduction. FOTO score is 90 at last test 18. All STG/LTGs achieved as identified below.     Fall Risk Assessment: Patient demonstrates no Fall Risk    Progress towards goals / Updated goals:  Updated Goals: to be achieved in 10 treatments:  1.  Pt will report 50% improvement or better with involvement to show a significant increase in function PN Status: N/A                        Current Status:  2.  Pt will have MMT right shoulder gross 4+/5 to return to usual work tasks                        PN Status: MMT Right Shoulder Flx 4  ABD 3 Ext 5- ADD 4+                        Current Status: ABD3+ today 11/8/18  3.  Pt will tolerate reaching to shoulder level with 10# or more to return to working at shoulder level with normal work duties                        PN Status: Difficulty sustaining work at shoulder level                        Current Status: Progressing at 3-6# today times 20 reps reaching above shoulder 11/28/18  4.  Pt will improve FOTO score to 64 in 17 visits to show significant improvement for progress to good right shoulder function                        PN Status: FOTO 48                        Current Status: Progressing at 90 11/20/18           Updated Goals: to be achieved in 10 treatments:  Continue with current goals    ASSESSMENT/RECOMMENDATIONS:  [x]Continue therapy per initial plan/protocol at a frequency of  2-3 x per week for 10 visits  []Continue therapy with the following recommended changes:_____________________      _____________________________________________________________________  []Discontinue therapy progressing towards or have reached established goals  []Discontinue therapy due to lack of appreciable progress towards goals  []Discontinue therapy due to lack of attendance or compliance  []Await Physician's recommendations/decisions regarding therapy  []Other:________________________________________________________________    Thank you for this referral.   Prakash Park, PT 11/28/2018 7:14 PM  NOTE TO PHYSICIAN:  PLEASE COMPLETE THE ORDERS BELOW AND   FAX TO [facility]: [phone]  If you are unable to process this request in 24 hours please contact our office:   [phone]  []  I have read the above report and request that my patient continue as recommended.   []  I have read the above report and request that my patient continue therapy with the following changes/special instructions:________________________________________  []I have read the above report and request that my patient be discharged from therapy.     [de-identified] Signature:____________Date:_________TIME:________    [facility], Date and Time must be completed for valid certification **

## 2018-11-29 ENCOUNTER — HOSPITAL ENCOUNTER (OUTPATIENT)
Dept: PHYSICAL THERAPY | Age: 43
Discharge: HOME | End: 2018-11-29
Payer: OTHER GOVERNMENT

## 2018-11-29 PROCEDURE — 97110 THERAPEUTIC EXERCISES: CPT

## 2018-11-29 NOTE — PROGRESS NOTES
PT DAILY TREATMENT NOTE 10-18    Patient Name: Daxa Aguayo  Date:2018  : 1975  [x]  Patient  Verified  Payor:  / Plan: Piedad Myerster / Product Type:  /    In time:3:56  Out time: 4:41  Total Treatment Time (min): 45  Visit #: 8 of 10    Treatment Area: Right shoulder pain [M25.511]    SUBJECTIVE  Pain Level (0-10 scale): 0  Any medication changes, allergies to medications, adverse drug reactions, diagnosis change, or new procedure performed?: [x] No    [] Yes (see summary sheet for update)  Subjective functional status/changes:   [] No changes reported  Doing good, no trouble with daily tasks. Able to do usual work    OBJECTIVE      48 min Therapeutic Exercise:  [x] See flow sheet :   Rationale: increase ROM, increase strength and improve coordination to improve the patients ability to return to usual activity     With   [x] TE   [] TA   [] neuro   [] other: Patient Education: [x] Review HEP    [] Progressed/Changed HEP based on:   [] positioning   [] body mechanics   [] transfers   [] heat/ice application    [] other:      Other Objective/Functional Measures:  - Good exercise particiation       Pain Level (0-10 scale) post treatment: 0    ASSESSMENT/Changes in Function:   - Good tolerance to added resisted program    Patient will continue to benefit from skilled PT services to modify and progress therapeutic interventions, address functional mobility deficits, address ROM deficits, address strength deficits, analyze and address soft tissue restrictions and analyze and cue movement patterns to attain remaining goals.      []  See Plan of Care  []  See progress note/recertification  []  See Discharge Summary         Progress towards goals / Updated goals:  Updated Goals: to be achieved in 10 treatments:  1.  Pt will report 50% improvement or better with involvement to show a significant increase in function  PN Status: N/A                        Current Status:  2.  Pt will have MMT right shoulder gross 4+/5 to return to usual work tasks  PN Status: MMT Right Shoulder Flx 4  ABD 3 Ext 5- ADD 4+                        Current Status: ABD3+ after treatment today 11/29/18  3.  Pt will tolerate reaching to shoulder level with 10# or more to return to working at shoulder level with normal work duties  PN Status: Difficulty sustaining work at shoulder level                        Current Status: Progressing at 3-6# today times 20 reps reaching above shoulder 11/28/18  4.  Pt will improve FOTO score to 64 in 17 visits to show significant improvement for progress to good right shoulder function  PN Status: FOTO 48                        Current Status: Progressing at 90 11/20/18    PLAN  [x]  Upgrade activities as tolerated     [x]  Continue plan of care  []  Update interventions per flow sheet       []  Discharge due to:_  []  Other:_      Flori Hernandez, PT 11/29/2018  5:02PM    Future Appointments   Date Time Provider Austen Castellon   12/4/2018  4:00 PM Garry Colbert [facility] [address]   12/6/2018  4:00 PM Missy Sheffield, PT [facility] [address]   12/7/2018  3:30 PM Dominick Haney MD [address]

## 2018-12-04 ENCOUNTER — HOSPITAL ENCOUNTER (OUTPATIENT)
Dept: PHYSICAL THERAPY | Age: 43
Discharge: HOME | End: 2018-12-04
Payer: OTHER GOVERNMENT

## 2018-12-04 PROCEDURE — 97110 THERAPEUTIC EXERCISES: CPT

## 2018-12-04 NOTE — PROGRESS NOTES
PT DAILY TREATMENT NOTE 10-18    Patient Name: Kelley Deshpande  Date:2018  : 1975  [x]  Patient  Verified  Payor: MARI[place] / Plan: Dc Bellamy 74 / Product Type:  /    In time:3:59  Out time: 4:48  Total Treatment Time (min): 48  Visit #: 2 of 10    Treatment Area: Right shoulder pain [M25.511]    SUBJECTIVE  Pain Level (0-10 scale): 0  Any medication changes, allergies to medications, adverse drug reactions, diagnosis change, or new procedure performed?: [x] No    [] Yes (see summary sheet for update)  Subjective functional status/changes:   [] No changes reported  Getting stronger. The exercises are getting easier. OBJECTIVE      48 min Therapeutic Exercise:  [x] See flow sheet :   Rationale: increase ROM, increase strength and improve coordination to improve the patients ability to return to usual activity     With   [x] TE   [] TA   [] neuro   [] other: Patient Education: [x] Review HEP    [] Progressed/Changed HEP based on:   [] positioning   [] body mechanics   [] transfers   [] heat/ice application    [] other:      Other Objective/Functional Measures:  - Good exercise particiation       Pain Level (0-10 scale) post treatment: 0    ASSESSMENT/Changes in Function:   - Good tolerance to added resisted program    Patient will continue to benefit from skilled PT services to modify and progress therapeutic interventions, address functional mobility deficits, address ROM deficits, address strength deficits, analyze and address soft tissue restrictions and analyze and cue movement patterns to attain remaining goals.      []  See Plan of Care  []  See progress note/recertification  []  See Discharge Summary         Progress towards goals / Updated goals:  Updated Goals: to be achieved in 10 treatments:  1.  Pt will report 50% improvement or better with involvement to show a significant increase in function  PN Status: N/A                        Current Status:  2.  Pt will have MMT right shoulder gross 4+/5 to return to usual work tasks  PN Status: MMT Right Shoulder Flx 4  ABD 3 Ext 5- ADD 4+                        Current Status: ABD3+ after treatment today 11/29/18  3.  Pt will tolerate reaching to shoulder level with 10# or more to return to working at shoulder level with normal work duties  PN Status: Difficulty sustaining work at shoulder level                        Current Status: Progressing at 3-6# today times 20 reps reaching above shoulder 11/28/18  4.  Pt will improve FOTO score to 64 in 17 visits to show significant improvement for progress to good right shoulder function  PN Status: FOTO 48                        Current Status: Progressing at 90 11/20/18    PLAN  [x]  Upgrade activities as tolerated     [x]  Continue plan of care  []  Update interventions per flow sheet       []  Discharge due to:_  []  Other:_      Mechelle Gooden, PT 12/4/2018  5:02PM    Future Appointments   Date Time Provider Austen Castellon   12/6/2018  4:00 PM [facility] [place] [facility]"" [facility]   12/7/2018  3:30 PM MD Mimi Bonilla 67

## 2018-12-06 ENCOUNTER — HOSPITAL ENCOUNTER (OUTPATIENT)
Dept: PHYSICAL THERAPY | Age: 43
Discharge: HOME | End: 2018-12-06
Payer: OTHER GOVERNMENT

## 2018-12-06 PROCEDURE — 97110 THERAPEUTIC EXERCISES: CPT

## 2018-12-06 NOTE — PROGRESS NOTES
PT DAILY TREATMENT NOTE 10-18    Patient Name: Magdiel Warren  Date:2018  : 1975  [x]  Patient  Verified  Payor:  / Plan: Nehemias Rossi / Product Type:  /    In time:4:01  Out time: 4:50  Total Treatment Time (min): 49  Visit #: 3 of 10    Treatment Area: Right shoulder pain [M25.511]    SUBJECTIVE  Pain Level (0-10 scale): 0  Any medication changes, allergies to medications, adverse drug reactions, diagnosis change, or new procedure performed?: [x] No    [] Yes (see summary sheet for update)  Subjective functional status/changes:   [] No changes reported  Able to walk ladder at work with no difficulty. Pt reports 75% improvement     OBJECTIVE      49 min Therapeutic Exercise:  [x] See flow sheet :   Rationale: increase ROM, increase strength and improve coordination to improve the patients ability to return to usual activity     With   [x] TE   [] TA   [] neuro   [] other: Patient Education: [x] Review HEP    [] Progressed/Changed HEP based on:   [] positioning   [] body mechanics   [] transfers   [] heat/ice application    [] other:      Other Objective/Functional Measures:  - Good exercise participation  - Increase weight with lat pulls w/o significant difficulty       Pain Level (0-10 scale) post treatment: 0    ASSESSMENT/Changes in Function:   - Good tolerance to added resisted program    Patient will continue to benefit from skilled PT services to modify and progress therapeutic interventions, address functional mobility deficits, address ROM deficits, address strength deficits, analyze and address soft tissue restrictions and analyze and cue movement patterns to attain remaining goals.      []  See Plan of Care  []  See progress note/recertification  []  See Discharge Summary         Progress towards goals / Updated goals:  Updated Goals: to be achieved in 10 treatments:  1.  Pt will report 50% improvement or better with involvement to show a significant increase in function  PN Status: N/A                        Current Status:  2.  Pt will have MMT right shoulder gross 4+/5 to return to usual work tasks  PN Status: MMT Right Shoulder Flx 4  ABD 3 Ext 5- ADD 4+                        Current Status: FLX 5 Ext 5 ADD 5 ABD3-3+ 12/6/18  3.  Pt will tolerate reaching to shoulder level with 10# or more to return to working at shoulder level with normal work duties  PN Status: Difficulty sustaining work at shoulder level                        Current Status: Met at [employer] with 10# 3 x 10 Reps  4.  Pt will improve FOTO score to 64 in 17 visits to show significant improvement for progress to good right shoulder function  PN Status: FOTO 48                        Current Status: Met at 90 11/20/18    PLAN  [x]  Upgrade activities as tolerated     [x]  Continue plan of care  []  Update interventions per flow sheet       []  Discharge due to:_  []  Other:_      Brandi Pritchett, PT 12/6/2018  5:02PM    Future Appointments   Date Time Provider Austen Castellon   12/7/2018  3:30 PM Yisel Harmon MD [facility] 69

## 2018-12-06 NOTE — PROGRESS NOTES
[facility] at [address] [facility]  [address]  Phone: [phone]      Fax:  [phone]    Progress Note  Plan of Care/ Statement of Necessity for Physical Therapy Services  Patient name: Lindajean Harada Start of Care: 2018   Referral source: Sera Veronica MD : 1975               Medical Diagnosis: Right shoulder pain [M25.511]    Onset Date: Surgery 18               Treatment Diagnosis: Right shoulder weakness and limited mobility   Prior Hospitalization: see medical history Provider#: [id number]   Medications: Verified on Patient summary List    Comorbidities: DM   Prior Level of Function: Able to do normal daily activity and work with periodic popping and cracking.  Was always stronger in the right arm      Visits from Start of Care: 19    Missed Visits: 0    Established Goals:          Excellent Good         Limited           None  [] Increased ROM   [x]  []  []  []  [] Increased Strength  []  [x]  []  []  [] Increased Mobility  []  [x]  []  []   [] Decreased Pain   []  [x]  []  []  [] Decreased Swelling  []  []  []  []    Key Functional Changes: Patient has shown good progress with this treatment program. Pain as of last visit was 0/10. Patient has shown decreased pain and increased strength and mobility. Patient reports continued improvement with overall involvement with the ability to return to work and perform most tasks, to include climbing ladders w/o difficulty. AROM right shoulder WNL, Strength test at FLX 5 Ext 5 ADD 5 ABD3-3+, with limitations primarily in right shulder Abduction. FOTO score is 90 at last test 18. All STG/LTGs achieved as identified below.     Fall Risk Assessment: Patient demonstrates no Fall Risk    Progress towards goals / Updated goals:  Updated Goals: to be achieved in 10 treatments:  1.  Pt will report 50% improvement or better with involvement to show a significant increase in function  PN Status: N/A                        Current Status:  2.  Pt will have MMT right shoulder gross 4+/5 to return to usual work tasks  PN Status: MMT Right Shoulder Flx 4  ABD 3 Ext 5- ADD 4+                        Current Status: FLX 5 Ext 5 ADD 5 ABD3-3+ 12/6/18  3.  Pt will tolerate reaching to shoulder level with 10# or more to return to working at shoulder level with normal work duties  PN Status: Difficulty sustaining work at shoulder level                        Current Status: Met at [employer] with 10# 3 x 10 Reps  4.  Pt will improve FOTO score to 59 in 16 visits to show significant improvement for progress to good right shoulder function  PN Status: FOTO 48                        Current Status: Met at 90 11/20/18      Updated Goals: to be achieved in 7 remaining visits treatments:  Continue with current goals    ASSESSMENT/RECOMMENDATIONS:  [x]Continue therapy per initial plan/protocol at a frequency of  2-3 x per week for 7 visits  []Continue therapy with the following recommended changes:_____________________      _____________________________________________________________________  []Discontinue therapy progressing towards or have reached established goals  []Discontinue therapy due to lack of appreciable progress towards goals  []Discontinue therapy due to lack of attendance or compliance  []Await Physician's recommendations/decisions regarding therapy  []Other:________________________________________________________________    Thank you for this referral.   Mehrdad Muñoz, PT 12/6/2018 5:10 PM  NOTE TO PHYSICIAN:  PLEASE COMPLETE THE ORDERS BELOW AND   FAX TO [facility]: [phone]  If you are unable to process this request in 24 hours please contact our office:   [phone]  []  I have read the above report and request that my patient continue as recommended.   []  I have read the above report and request that my patient continue therapy with the following changes/special instructions:________________________________________  []I have read the above report and request that my patient be discharged from therapy.     [de-identified] Signature:____________Date:_________TIME:________    [employer], Date and Time must be completed for valid certification **

## 2018-12-07 ENCOUNTER — OFFICE VISIT (OUTPATIENT)
Dept: ORTHOPEDIC SURGERY | Facility: CLINIC | Age: 43
End: 2018-12-07

## 2018-12-07 VITALS
HEART RATE: 78 BPM | BODY MASS INDEX: 40.43 KG/M2 | DIASTOLIC BLOOD PRESSURE: 83 MMHG | SYSTOLIC BLOOD PRESSURE: 146 MMHG | WEIGHT: 315 LBS | TEMPERATURE: 96.8 F | HEIGHT: 74 IN | OXYGEN SATURATION: 97 % | RESPIRATION RATE: 16 BRPM

## 2018-12-07 DIAGNOSIS — M75.121 COMPLETE TEAR OF RIGHT ROTATOR CUFF: Primary | ICD-10-CM

## 2018-12-07 NOTE — LETTER
NOTIFICATION RETURN TO WORK / SCHOOL 
 
12/7/2018 3:31 PM 
 
Mr. Saima Dumas [address] [place] [id number] To Whom It May Concern: 
 
Saima Dumas is currently under the care of [address] [place] [address]. He will return to work/school on: 12/10/18 Without restrictions If there are questions or concerns please have the patient contact our office.  
 
 
 
Sincerely, 
 
 
Natalie Gr MD
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
## 2019-01-01 NOTE — PROGRESS NOTES
.[facility] at [address] [facility] [facility]  Phone: [phone]      Fax:  [phone]    Discharge Summary    Patient name: Ghulam Wu [place] of Care: 2018   Referral Joaquín Hays MD : 1975               Medical Diagnosis: Right shoulder pain [M25.511]    Onset Date: Surgery 18               Treatment Diagnosis: Right shoulder weakness and limited mobility   Prior Hospitalization: see medical history Provider#: [id number]   Medications: Verified on Patient summary List    Comorbidities: DM   Prior Level of Function: Able to do normal daily activity and work with periodic popping and cracking.  Was always stronger in the right arm      Visits from Start of Care: 19    Missed Visits: 0    Reporting Period : 18 to 18      Progress towards goals / Updated goals:  Updated Goals: to be achieved in 10 treatments:  1.  Pt will report 50% improvement or better with involvement to show a significant increase in function  PN Status: N/A                        Current Status:  2.  Pt will have MMT right shoulder gross 4+/5 to return to usual work tasks  PN Status: MMT Right Shoulder Flx 4  ABD 3 Ext 5- ADD 4+                        Current Status: Progressing at FLX 5 Ext 5 ADD 5 ABD3-3+ 18  3.  Pt will tolerate reaching to shoulder level with 10# or more to return to working at shoulder level with normal work duties  PN Status: Difficulty sustaining work at shoulder level                        Current Status: Met at [employer] with 10# 3 x 10 Reps  4.  Pt will improve FOTO score to 64 in 17 visits to show significant improvement for progress to good right shoulder function  PN Status: FOTO 48                        Current Status: Met at 90 18        Assessment/ Summary of Care: Patient has shown good progress with this treatment program. Pain as of last visit was 0/10.  Patient has shown decreased pain and increased strength and mobility. Patient reports continued improvement with overall involvement with the ability to return to work and perform most tasks, to include climbing ladders w/o difficulty. AROM right shoulder WNL, Strength test at FLX 5 Ext 5 ADD 5 ABD3-3+, with limitations primarily in right shulder Abduction. FOTO score is 90 at last test 11/20/18. All STG/LTGs achieved as identified below. Fall Risk Assessment: Patient demonstrates no Fall Risk    No further contact or instruction to patient after last progress note on 12/6/18.     RECOMMENDATIONS:  [x]Discontinue therapy: [x]Patient has reached or is progressing toward set goals      []Patient is non-compliant or has abdicated      []Due to lack of appreciable progress towards set goals    aKylan Wolf, PT 1/1/2019 1:25 PM

## 2019-05-09 NOTE — PROGRESS NOTES
PT DAILY TREATMENT NOTE 10-18 Patient Name: Loki Vail Date:10/2/2018 : 1975 [x]  Patient  Verified Payor: MARI[employer]TA / Plan: Crista Fothergill / Product Type: [employer] / In time:5:30  Out time:6:27 Total Treatment Time (min): 44 Visit #: 2 of 10 Treatment Area: Right shoulder pain [M25.511] SUBJECTIVE Pain Level (0-10 scale): 1 Any medication changes, allergies to medications, adverse drug reactions, diagnosis change, or new procedure performed?: [x] No    [] Yes (see summary sheet for update) Subjective functional status/changes:   [] No changes reported Feeling [retty good, very little pain OBJECTIVE 28 min Therapeutic Exercise:  [x] See flow sheet :  
Rationale: increase ROM to improve the patients ability to progress to AROM 16 min Manual Therapy:  PROM right shoulder PROM Shoulder Flx/Ext/ABD/ER/IR,  Gentle LAD Right UE, PNF D1/D2 F/E pattern, scap mobs Rationale: decrease pain, increase ROM and increase tissue extensibility to tolerate increased activity levels With 
 [x] TE 
 [] TA 
 [] neuro 
 [] other: Patient Education: [x] Review HEP [] Progressed/Changed HEP based on:  
[] positioning   [] body mechanics   [] transfers   [] heat/ice application   
[] other:   
 
Other Objective/Functional Measures:  
- PROM Shoulder F/* unforced Pain Level (0-10 scale) post treatment: 0 
 
ASSESSMENT/Changes in Function: - Good tolerance with first full treatment - Good exercise participation - Good response to exercise and manual therapy with decreased pain Patient will continue to benefit from skilled PT services to modify and progress therapeutic interventions, address functional mobility deficits, address ROM deficits, address strength deficits, analyze and address soft tissue restrictions and analyze and cue movement patterns to attain remaining goals. []  See Plan of Care 
[]  See progress note/recertification []  See Discharge Summary Progress towards goals / Updated goals: 
Short Term Goals: To be accomplished in 5 treatments: 1.  Pt will be compliant and independent with HEP in order to facilitate PT sessions and aid with self management 
                      Eval Status:  Initiated 
                      Current Status: Progressng, pt reports compliance 10/2/18 2.  Pt to tolerate 30 min or more of TE and/or Interventions w/o increased s/s                       Eval Status:  Initiated 
                      WNZTPSV Status: Met at 43 Min 10/2/18 Long Term Goals: To be accomplished in 10 treatments: 1.  Pt will report 50% improvement or better with involvement to show a significant increase in function                       Eval Status:  Initiated 
                      QFNFPQK Status: 2.  Pt will have unforced PROM to 120* Right Shoulder Flx/Abd for progress to AAROM w/o difficulty  
                      Eval Status:  Initiated 
                      KCIQWBD Status: Met with 140* or better 10/2/18 3.  Pt will have decreased pain at 3/10 or better with MT for good tolerance allowing improved Right Shoulder mobility                       Eval Status:  5 
                      Current Status: 4.  Pt will improve FOTO score to 64 in 17 visits to show significant improvement for progress to good right shoulder function                       Eval Status: FOTO 28 
                      Current Status: PLAN [x]  Upgrade activities as tolerated     [x]  Continue plan of care 
[]  Update interventions per flow sheet      
[]  Discharge due to:_ 
[]  Other:_ Gwendolynn Babinski, PT 10/2/2018  6:30 PM 
 
Future Appointments Date Time Provider Austen Castellon 10/4/2018 1:30 PM [facility]  
10/4/2018 4:00 PM MD Kusum Paiz  
10/8/2018 4:00 PM Gwendolynn Babinski, PT [facility]  
10/10/2018 4:00 PM Elzbieta Sanders  
 10/12/2018 10:30 AM Clent Spar, PT [facility]  
10/16/2018 4:30 PM Clent Spar, PT [facility]  
10/17/2018 4:00 PM Clent Spar, PT [facility]  
10/19/2018 10:00 AM Clent Spar, PT [facility]  
10/22/2018 4:00 PM Clent Spar, PT [facility]  
10/24/2018 4:00 PM Clent Spar, PT [facility]  
10/26/2018 10:30 AM Clent Spar, PT [facility]  
10/26/2018 3:45 PM MD Alfredo Phillips   
 
 O-Z Flap Text: The defect edges were debeveled with a #15 scalpel blade.  Given the location of the defect, shape of the defect and the proximity to free margins an O-Z flap was deemed most appropriate.  Using a sterile surgical marker, an appropriate transposition flap was drawn incorporating the defect and placing the expected incisions within the relaxed skin tension lines where possible. The area thus outlined was incised deep to adipose tissue with a #15 scalpel blade.  The skin margins were undermined to an appropriate distance in all directions utilizing iris scissors.

## 2019-05-23 ENCOUNTER — DOCUMENTATION ONLY (OUTPATIENT)
Dept: ORTHOPEDIC SURGERY | Age: 44
End: 2019-05-23

## 2019-05-24 ENCOUNTER — TELEPHONE (OUTPATIENT)
Dept: ORTHOPEDIC SURGERY | Facility: CLINIC | Age: 44
End: 2019-05-24

## 2019-05-24 ENCOUNTER — OFFICE VISIT (OUTPATIENT)
Dept: ORTHOPEDIC SURGERY | Age: 44
End: 2019-05-24

## 2019-05-24 DIAGNOSIS — M19.011 PRIMARY OSTEOARTHRITIS OF RIGHT SHOULDER: Primary | ICD-10-CM

## 2019-05-24 DIAGNOSIS — M25.511 ACUTE PAIN OF RIGHT SHOULDER: ICD-10-CM

## 2019-05-24 NOTE — PROGRESS NOTES
Dulce Maria Powell  1975     HISTORY OF PRESENT ILLNESS  Dulce Maria Powell is a 53 y.o. male who presents today for evaluation s/p Right shoulder arthroscopic rotator cuff repair, biceps tenodesis, and distal clavicle excision and decompression on 7/18/18. Patient has finished going to PT. He comes in today for follow up. He was at work 2 days ago and climbing a ladder when he heard a pop and had immediate pain. The pain has improved since the injury with ice and NSAIDs but has not completely resolved. He is also having a lot of pain with movement. He is doing his exercises at home. Patient denies any fever, chills, chest pain, shortness of breath or calf pain. There are no new illness to report since last seen in the office. PHYSICAL EXAM:   There were no vitals taken for this visit. The patient is a well-developed, well-nourished male in no acute distress. The patient is alert and oriented times three. The patient appears to be well groomed. Mood and affect are normal.  ORTHOPEDIC EXAM of right shoulder:  Inspection: swelling not present,  Bruising not present  Incisions well healed  Passive glenohumeral abduction 0-45 degrees, 0-90 degrees of forward flexion, 20 ER, AROM 70 FF  Pain with biceps stress testing  Stability: Stable  Strength: n/a  2+ distal pulses    Difficult to test rotator cuff due to pain, ROM difficult to assess due to pain    XR: 4 views of the right shoulder do not show any acute bony abnormalities    IMPRESSION:  S/P Right shoulder arthroscopic rotator cuff repair, biceps tenodesis, and distal clavicle excision and decompression    PLAN:   Pt having right shoulder pain after injury  I think he strained his shoulder. It is difficult to exam his rotator cuff at this time does to pain. I am hopeful with rest this will improve. If it does not the next step is to order an MRI to assess the rotator cuff repair. He will continue ice and OTC NSAIDs for pain.  He will rest his shoulder for a few days and work on his PROM exercises so he does not get stiff. Will give a note to be on light duty for 4 weeks and follow up in that time to be evaluated. No climbing ladders, bending, stooping or lifting more then 5 lbs with right arm at this time. Stressed to patient that nothing causes an increase in pain.   RTC 3 weeks    Patient seen and evaluated by Dr. Tripathi Patient today who agrees with treatment plan      Redd Ordoñez [facility]ist

## 2019-05-24 NOTE — TELEPHONE ENCOUNTER
Called to make patient aware that his note is available for  at the [facility] office.  Unable to leave VM

## 2019-06-14 ENCOUNTER — OFFICE VISIT (OUTPATIENT)
Dept: ORTHOPEDIC SURGERY | Age: 44
End: 2019-06-14

## 2019-06-14 VITALS
WEIGHT: 307.2 LBS | HEART RATE: 64 BPM | BODY MASS INDEX: 39.42 KG/M2 | OXYGEN SATURATION: 94 % | HEIGHT: 74 IN | SYSTOLIC BLOOD PRESSURE: 139 MMHG | DIASTOLIC BLOOD PRESSURE: 87 MMHG

## 2019-06-14 DIAGNOSIS — M19.011 PRIMARY OSTEOARTHRITIS OF RIGHT SHOULDER: Primary | ICD-10-CM

## 2019-06-14 DIAGNOSIS — M75.21 TENDINITIS OF LONG HEAD OF BICEPS BRACHII OF RIGHT SHOULDER: ICD-10-CM

## 2019-06-14 DIAGNOSIS — Z98.890 STATUS POST ARTHROSCOPY OF RIGHT SHOULDER: ICD-10-CM

## 2019-06-14 RX ORDER — DICLOFENAC SODIUM 10 MG/G
2 GEL TOPICAL 4 TIMES DAILY
Qty: 100 G | Refills: 2 | Status: SHIPPED | OUTPATIENT
Start: 2019-06-14 | End: 2020-08-05

## 2019-06-14 NOTE — LETTER
NOTIFICATION RETURN TO WORK / SCHOOL 
 
6/14/2019 11:03 AM 
 
Mr. Baldev Rodriges [address] [place] [id number] To Whom It May Concern: 
 
Baldev Rodriges is currently under the care of [address] [place] [address] Manuel Julien. He is to return to work on light duty for 4 weeks, with the following restrictions: No climbing ladders No lifting more than 5 pounds with right right arm. No pushing or pulling with right arm No lifting above shoulder height He will follow up and be reevaluated in that time. If there are questions or concerns please have the patient contact our office.  
 
 
 
Sincerely, 
 
 
ROXANA Cavazos

## 2019-06-14 NOTE — PROGRESS NOTES
Ed Jefferson  1975     HISTORY OF PRESENT ILLNESS  Ed Jefferson is a 53 y.o. male who presents today for evaluation s/p Right shoulder arthroscopic rotator cuff repair, biceps tenodesis, and distal clavicle excision and decompression on 7/18/18. Patient has finished going to PT. He comes in today for follow up. He is doing his exercises at home. His pain has improved since last visit. He has taken OTC pain medications to help. His pain is mainly located in the biceps tendon area. Patient denies any fever, chills, chest pain, shortness of breath or calf pain. There are no new illness to report since last seen in the office. PHYSICAL EXAM:   Visit Vitals  /87   Pulse 64   Ht 6' 2\" (1.88 m)   Wt 307 lb 3.2 oz (139.3 kg)   SpO2 94%   BMI 39.44 kg/m²      The patient is a well-developed, well-nourished male in no acute distress. The patient is alert and oriented times three. The patient appears to be well groomed. Mood and affect are normal.  ORTHOPEDIC EXAM of right shoulder:  Inspection: swelling not present,  Bruising not present  Incisions well healed  Passive glenohumeral abduction 0-90 degrees, 0-170 degrees of forward flexion, 60 ER, AROM 170 FF  Pain with biceps stress testing, supraspinatus testing  Stability: Stable  Strength: n/a  2+ distal pulses        XR: 4 views of the right shoulder do not show any acute bony abnormalities    IMPRESSION:  S/P Right shoulder arthroscopic rotator cuff repair, biceps tenodesis, and distal clavicle excision and decompression    PLAN:   Pt having right shoulder pain  His pain has improved since last visit. He is still having some residual biceps tendon pain. I would like to try some PT to help and continue making improvements with pain. If it does not the next step is to order an MRI to assess the rotator cuff repair. He will continue ice and OTC NSAIDs for pain. He will continue his exercises at home.  Pt given order for volteran gel to help with discomfort. Will give a note to be on light duty for 4 weeks and follow up in that time to be evaluated. No climbing ladders, bending, stooping or lifting more then 5 lbs with right arm at this time. Stressed to patient that nothing causes an increase in pain.   RTC 4 weeks        HUGH Mccarthy [facility]ist

## 2019-06-26 ENCOUNTER — HOSPITAL ENCOUNTER (OUTPATIENT)
Dept: PHYSICAL THERAPY | Age: 44
Discharge: HOME | End: 2019-06-26
Payer: OTHER GOVERNMENT

## 2019-06-26 PROCEDURE — 97161 PT EVAL LOW COMPLEX 20 MIN: CPT

## 2019-06-26 PROCEDURE — 97140 MANUAL THERAPY 1/> REGIONS: CPT

## 2019-06-26 PROCEDURE — 97110 THERAPEUTIC EXERCISES: CPT

## 2019-06-26 NOTE — PROGRESS NOTES
PT DAILY TREATMENT NOTE 10-18    Patient Name: Katy Sandoval  Date:2019  : 1975  [x]  Patient  Verified  Payor: DEPARTMENT OF LABOR / Plan: [address] [place] [address] Avenue / Product Type: Workers Comp /    In time:3:16  Out time:4:59  Total Treatment Time (min): 43  Visit #: 1 of 10         Medicare/BCBS Only   Total Timed Codes (min):    1:1 Treatment Time:            Treatment Area: Bicipital tendinitis, right shoulder [M75.21]  Other specified postprocedural states [Z98.890]        SUBJECTIVE  Pain Level (0-10 scale): 2  [x]constant []intermittent []improving []worsening []no change since onset     Any medication changes, allergies to medications, adverse drug reactions, diagnosis change, or new procedure performed?: [x] No    [] Yes (see summary sheet for update)  Subjective functional status/changes:        Subjective Pt c/o right shoulder pain after feeling a pop in the right shoulder at what is described at the bicipital grove. Pt reports pain with raising arm. Pt reports that he does not attempt to reach overhead with the right UE due to fear of shoulder pain.       Mechanism of Injury: Pulling body weight up ladder     FOTO: 45 / 66 in 19 visits     Goal: Use arm without any pain or surgery     PLOF: Able to climb ladder with no difficulty     Health Status: Fair        What type of work do you do?      Living Situation/Domestic Life:    Mobility: Mod(I) right shoulder  Self Care (I)     What type of daily activities/hobbies?  Work, climbing ladders,     Limitations to Activity/Recreation/PLOF: Can't move but about a few pounds, can't reach overhead, can't climb a ladder, severely limited at work      Barriers: [x]pain []financial []time []transportation []other     Motivation: High     FABQ Score: []low []elevate     Cognition: A & O x 3    Other:     Risk For Falls:   [x]No  []low []elevate             OBJECTIVE/EXAMINATION  - Painful pop at return from abd  - TTP right anterior deltoid/proximal biceps tendon/Infraspinatus/Pec  - Hyper sensation right Anterior/lateral deltoids                  AROM PROM Strength Pain? ?     Right Left Right Left Right Left     Shoulder Flx 60*               Shoulder Ext 25               Shoulder ABD 67               Shoulder ADD UA               Shoulder                Shoulder ER                 Elbow flx 127 140                                                                     10 min [x]Eval                  []Re-Eval         10 min Therapeutic Exercise:  [] See flow sheet : develop and instruct HEP   Rationale: increase ROM, increase strength and improve coordination to improve the patients ability to use right UE     23 min Manual Therapy: [facility] joint  Mobs/mobility all planes, Pt Edu on RTC strain, KT 2 I-Strips for spacing at the anterior deltoid and     Rationale: decrease pain, increase ROM, increase tissue extensibility and decrease trigger points to increase ability to use right UE                                                                  With   [x] TE   [] TA   [] neuro   [] other: Patient Education: [x] Review HEP    [] Progressed/Changed HEP based on:   [] positioning   [] body mechanics   [] transfers   [] heat/ice application    [] other:       Other Objective/Functional Measures:        Pain Level (0-10 scale) post treatment: 2     ASSESSMENT/Changes in Function:       [x]  See Plan of Care  []  See progress note/recertification  []  See Discharge Summary     Progress towards goals / Updated goals:  See POC      PLAN  [x]  Upgrade activities as tolerated     [x]  Continue plan of care  []  Update interventions per flow sheet       []  Discharge due to:_  []  Other:_          Yaa Hernandez, PT 6/26/2019  3:16 PM    Future Appointments   Date Time Provider Austen Castellon   7/26/2019 11:00 AM ROXANA Avalos 69

## 2019-06-26 NOTE — PROGRESS NOTES
[facility] at [address] [facility] [address] [facility], [address] [place] [address]  Phone: [phone]      Fax:  [phone]    Plan of Care/ Statement of Necessity for Physical Therapy Services  Patient name: Tiffanie Jaime Start of Care: 2019   Referral source: Myrtle Almeida MD : 1975    Medical Diagnosis: Bicipital tendinitis, right shoulder [M75.21]  Other specified postprocedural states [Z98.890]  Payor: [id number] [place] [address] / Plan: [address] [facility] / Product Type: Workers Comp /  Onset Date:2019    Treatment Diagnosis: Right RTC muscle strain, Biceps Tendon dysfunction, right neural tension Axillary Nerve pattern   Prior Hospitalization: see medical history Provider#: [id number]   Medications: Verified on Patient summary List    Comorbidities: None noted   Prior Level of Function: Able to climb a ladder at work with no difficulty     The Plan of Care and following information is based on the information from the initial evaluation. Assessment/ key information: Patient is a 53 y.o. male referred to PT with the above Dx. Patient seen today for c/o right shoulder pain after feeling a pop in the right shoulder at what is described as the bicipital grove region. Pt reports pain with raising arm and reaching out. Pt reports that he does not attempt to reach overhead with the right UE due to fear of shoulder pain. Patient presents to PT with decreased strength, decreased flexibility, and decreased mobility of the right UE. Patient s/s appear to be consistent w/ diagnosis. Patient demonstrates the potential to make functional gains within a reasonable time frame and will benefit from skilled PT to address impairments and improve functional mobility and strength for an improved quality of life.   Fall Risk Assessment: Patient demonstrates no Fall Risk   Evaluation Complexity History MEDIUM  Complexity : 1-2 comorbidities / personal factors will impact the outcome/ POC ; Examination LOW Complexity : 1-2 Standardized tests and measures addressing body structure, function, activity limitation and / or participation in recreation  ;Presentation MEDIUM Complexity : Evolving with changing characteristics  ; Clinical Decision Making MEDIUM Complexity : FOTO score of 26-74  Overall Complexity Rating: LOW   Problem List: pain affecting function, decrease ROM, decrease strength, decrease ADL/ functional abilitiies, decrease activity tolerance, decrease flexibility/ joint mobility, decrease transfer abilities and other FOTO = 45   Treatment Plan may include any combination of the following: Therapeutic exercise, Therapeutic activities, Neuromuscular re-education, Physical agent/modality, Manual therapy, Patient education, Self Care training and Functional mobility training  Patient / Family readiness to learn indicated by: asking questions, trying to perform skills and interest  Persons(s) to be included in education: patient (P)  Barriers to Learning/Limitations: None  Patient Goal (s): Use arm without any pain or surgery  Patient Self Reported Health Status: fair  Rehabilitation Potential: good      Short Term Goals: To be accomplished in 5 treatments:  1. Pt will be compliant and independent with HEP in order to facilitate PT sessions and aid with self management   Eval Status:  Initiated   Current Status:  2. Pt to tolerate 30 min or more of TE and/or Interventions w/o increased s/s   Eval Status:  Initiated   Current Status:    Long Term Goals: To be accomplished in 10 treatments:  1. Pt will report 50% improvement in fear of using the right UE to aid in improved function for light to moderate use of the right UE at or above shoulder level for improved function with daily activity   Eval Status:  Initiated   Current Status:  2.   Pt will reach overhead with pain 2/10 or better with light activity for progress to normal shoulder mobiltiy    Eval Status:  Unable to reach overhead   Current Status:  3. Pt will demonstrate AROM right Shoulder flx/ABD to 120* to perform light activity at shoulder level with little discomfort    Eval Status:  Flx 60 ABD 67 Ext 25    Current Status:  4. Pt will have little to no hesitation with use of right arm to shoulder level for progress to full AROM to carryover to climbing ladders at work     Eval Status:  Initiated   Current Status:  5. Pt will improve FOTO score to 66 in 19 visits to show significant improvement  for progress to good shoulder function   Eval Status: 45   Current Status:    Frequency / Duration: Patient to be seen 2-3 times per week for 10 treatments. Patient/ Caregiver education and instruction: Diagnosis, prognosis, self care, activity modification and exercises   [x]  Plan of care has been reviewed with GENEVIEVE Proctor, PT 6/26/2019 3:17 PM  _____________________________________________________________________  I certify that the above Therapy Services are being furnished while the patient is under my care. I agree with the treatment plan and certify that this therapy is necessary.     Physician's Signature:____________Date:_________TIME:________    ** Signature, Date and Time must be completed for valid certification **    Please sign and return to [facility] at [facility] [place] [address]. [facility]  Phone: [phone]      Fax:  [phone]

## 2019-07-15 ENCOUNTER — HOSPITAL ENCOUNTER (OUTPATIENT)
Dept: PHYSICAL THERAPY | Age: 44
Discharge: HOME | End: 2019-07-15
Payer: OTHER GOVERNMENT

## 2019-07-15 PROCEDURE — 97035 APP MDLTY 1+ULTRASOUND EA 15: CPT

## 2019-07-15 PROCEDURE — 97110 THERAPEUTIC EXERCISES: CPT

## 2019-07-15 PROCEDURE — 97140 MANUAL THERAPY 1/> REGIONS: CPT

## 2019-07-15 NOTE — PROGRESS NOTES
PT DAILY TREATMENT NOTE 10-18    Patient Name: Shweta Salcido  Date:7/15/2019  : 1975  [x]  Patient  Verified  Payor: /    In time:4:00  Out time: 4:55  Total Treatment Time (min): 55  Visit #: 2 of 10    Medicare/BCBS Only   Total Timed Codes (min):    1:1 Treatment Time:          Treatment Area: Bicipital tendinitis, right shoulder [M75.21]  Other specified postprocedural states [Z98.890]    SUBJECTIVE  Pain Level (0-10 scale): 0  Any medication changes, allergies to medications, adverse drug reactions, diagnosis change, or new procedure performed?: [x] No    [] Yes (see summary sheet for update)  Subjective functional status/changes:   [] No changes reported  Able to move better     OBJECTIVE    Modality rationale: decrease inflammation, decrease pain and increase tissue extensibility to improve the patients ability to perform normal activity   Min Type Additional Details    [] Estim:  []Unatt       []IFC  []Premod                        []Other:  []w/ice   []w/heat  Position:  Location:    [] Estim: []Att    []TENS instruct  []NMES                    []Other:  []w/US   []w/ice   []w/heat  Position:  Location:    []  Traction: [] Cervical       []Lumbar                       [] Prone          []Supine                       []Intermittent   []Continuous Lbs:  [] before manual  [] after manual   12 [x]  Ultrasound: [x]Continuous   [] Pulsed                           [x]1MHz   []3MHz W/cm2: 1.5  Location: Right Ant Deltoid/Pec/Infraspinatus    []  Iontophoresis with dexamethasone         Location: [] Take home patch   [] In clinic    []  Ice     []  heat  []  Ice massage  []  Laser   []  Anodyne Position:  Location:    []  Laser with stim  []  Other:  Position:  Location:    []  Vasopneumatic Device Pressure:       [] lo [] med [] hi   Temperature: [] lo [] med [] hi   [x] Skin assessment post-treatment:  [x]intact []redness- no adverse reaction    []redness - adverse reaction:     25 min Therapeutic Exercise:  [x] See flow sheet :   Rationale: increase ROM, increase strength and improve coordination to improve the patients ability to tolerate increased activity    18 min Manual Therapy:  STM/DTM right anterior deltoid/Pec/Infraspinatus   Rationale: decrease pain, increase ROM, increase tissue extensibility and decrease trigger points to tolerate improved work function       With   [x] TE   [] TA   [] neuro   [] other: Patient Education: [x] Review HEP    [] Progressed/Changed HEP based on:   [] positioning   [] body mechanics   [] transfers   [] heat/ice application    [] other:      Other Objective/Functional Measures:   - AROM Flx 160 ABD  148 pain 1/10  - MMT Shoulder Flx/ABD 4+ - 5-, Ext 5, ADD 5-    Pain Level (0-10 scale) post treatment: 0    ASSESSMENT/Changes in Function:   Improved right shoulder mobility and function    Patient will continue to benefit from skilled PT services to modify and progress therapeutic interventions, address functional mobility deficits, address ROM deficits, address strength deficits, analyze and address soft tissue restrictions and analyze and cue movement patterns to attain remaining goals. []  See Plan of Care  []  See progress note/recertification  []  See Discharge Summary         Progress towards goals / Updated goals:  Short Term Goals: To be accomplished in 5 treatments:  1. Pt will be compliant and independent with HEP in order to facilitate PT sessions and aid with self management              Eval Status:  Initiated              Current Status: Met, pt reports compliance 7/15/19  2. Pt to tolerate 30 min or more of TE and/or Interventions w/o increased s/s              Eval Status:  Initiated              Current Status: Met at 55 min today 7/15/19     Long Term Goals: To be accomplished in 10 treatments:  1.   Pt will report 50% improvement in fear of using the right UE to aid in improved function for light to moderate use of the right UE at or above shoulder level for improved function with daily activity              Eval Status:  Initiated              Current Status:  2. Pt will reach overhead with pain 2/10 or better with light activity for progress to normal shoulder mobiltiy               Eval Status:  Unable to reach overhead              Current Status:  3. Pt will demonstrate AROM right Shoulder flx/ABD to 120* to perform light activity at shoulder level with little discomfort               Eval Status:  Flx 60 ABD 67 Ext 25               Current Status:  4. Pt will have little to no hesitation with use of right arm to shoulder level for progress to full AROM to carryover to climbing ladders at work                Eval Status:  Initiated              Current Status:  5.   Pt will improve FOTO score to 66 in 19 visits to show significant improvement  for progress to good shoulder function              Eval Status: 45              Current Status:      PLAN  [x]  Upgrade activities as tolerated     [x]  Continue plan of care  []  Update interventions per flow sheet       []  Discharge due to:_  []  Other:_      Mallorie Bazan, PT 7/15/2019  4:07 PM    Future Appointments   Date Time Provider Austen Castellon   7/17/2019  4:00 PM Zafar Iniguez [facility]   7/19/2019  9:50 AM Prince Corona MD [facility] 69   7/22/2019  4:30 PM Bella Dee, PT [facility]   7/25/2019  4:00 PM Bella Dee, PT [facility]   7/29/2019  5:00 PM Bella Dee, PT [facility]   8/1/2019  4:00 PM Bryant Barron, PT [facility]   8/5/2019  4:00 PM Bella Dee, PT [facility]   8/7/2019  4:00 PM Bella Dee, PT [facility]   8/13/2019  4:00 PM Bella Dee, PT [facility]   8/15/2019  4:00 PM Bella Dee, PT [facility]

## 2019-07-17 ENCOUNTER — APPOINTMENT (OUTPATIENT)
Dept: PHYSICAL THERAPY | Age: 44
End: 2019-07-17

## 2019-07-18 ENCOUNTER — APPOINTMENT (OUTPATIENT)
Dept: PHYSICAL THERAPY | Age: 44
End: 2019-07-18
Payer: OTHER GOVERNMENT

## 2019-07-18 ENCOUNTER — HOSPITAL ENCOUNTER (OUTPATIENT)
Dept: PHYSICAL THERAPY | Age: 44
End: 2019-07-18

## 2019-07-19 ENCOUNTER — OFFICE VISIT (OUTPATIENT)
Dept: ORTHOPEDIC SURGERY | Age: 44
End: 2019-07-19

## 2019-07-19 VITALS
BODY MASS INDEX: 39.78 KG/M2 | WEIGHT: 310 LBS | HEIGHT: 74 IN | HEART RATE: 70 BPM | RESPIRATION RATE: 17 BRPM | OXYGEN SATURATION: 98 % | TEMPERATURE: 96.8 F | DIASTOLIC BLOOD PRESSURE: 86 MMHG | SYSTOLIC BLOOD PRESSURE: 144 MMHG

## 2019-07-19 DIAGNOSIS — M25.511 ACUTE PAIN OF RIGHT SHOULDER: Primary | ICD-10-CM

## 2019-07-19 NOTE — LETTER
NOTIFICATION RETURN TO WORK / SCHOOL 
 
7/19/2019 10:08 AM 
 
Mr. Briana Joy [address] [place] [id number] To Whom It May Concern: 
 
Briana Joy is currently under the care of [address] [place] [address] Manuel Julien. He is to continue to work on light duty for 4 weeks, with the following restrictions: No climbing ladders No lifting more than 5 pounds with right right arm. No pushing or pulling with right arm No lifting above shoulder height He will follow up and be reevaluated in that time. If there are questions or concerns please have the patient contact our office.  
 
 
 
Sincerely, 
 
 
Saundra Mosley MD

## 2019-07-19 NOTE — PROGRESS NOTES
1. Have you been to the ER, urgent care clinic since your last visit? Hospitalized since your last visit? No    2. Have you seen or consulted any other health care providers outside of the [address] [place] [address] since your last visit? Include any pap smears or colon screening.  No
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
## 2019-07-25 ENCOUNTER — APPOINTMENT (OUTPATIENT)
Dept: PHYSICAL THERAPY | Age: 44
End: 2019-07-25
Payer: OTHER GOVERNMENT

## 2019-07-29 ENCOUNTER — HOSPITAL ENCOUNTER (OUTPATIENT)
Dept: PHYSICAL THERAPY | Age: 44
Discharge: HOME | End: 2019-07-29
Payer: OTHER GOVERNMENT

## 2019-07-29 PROCEDURE — 97110 THERAPEUTIC EXERCISES: CPT

## 2019-07-29 PROCEDURE — 97035 APP MDLTY 1+ULTRASOUND EA 15: CPT

## 2019-07-29 NOTE — PROGRESS NOTES
PT DAILY TREATMENT NOTE - [facility] 316    Patient Name: Raymundo Cartwright  Date:2019  : 1975  [x]  Patient  Verified  Payor: DEPARTMENT OF LABOR / Plan: [address] [place] [address] / Product Type:  Workers Comp /    [employer] time:5:24  Total Treatment Time (min): 54  Total Timed Codes (min):    1:1 Treatment Time ([facility] only):     Visit #: 4 of 10    Treatment Area: Bicipital tendinitis, right shoulder [M75.21]  Other specified postprocedural states [Z98.890]    SUBJECTIVE  Pain Level (0-10 scale): 0  Any medication changes, allergies to medications, adverse drug reactions, diagnosis change, or new procedure performed?: [x] No    [] Yes (see summary sheet for update)  Subjective functional status/changes:   [] No changes reported  Have pain with full reach overhead/    OBJECTIVE  Modality rationale: decrease inflammation, decrease pain and increase tissue extensibility to improve the patients ability to perform normal activity   Min Type Additional Details    [] Estim:  []Unatt       []IFC  []Premod                        []Other:  []w/ice   []w/heat  Position:  Location:    [] Estim: []Att    []TENS instruct  []NMES                    []Other:  []w/US   []w/ice   []w/heat  Position:  Location:    []  Traction: [] Cervical       []Lumbar                       [] Prone          []Supine                       []Intermittent   []Continuous Lbs:  [] before manual  [] after manual   8 [x]  Ultrasound: [x]Continuous   [] Pulsed     8' Min                      [x]1MHz   []3MHz Location: Right anterior shoulder  W/cm2:1.7    []  Iontophoresis with dexamethasone         Location: [] Take home patch   [] In clinic    []  Ice     []  heat  []  Ice massage  []  Laser   []  Anodyne Position:  Location:    []  Laser with stim  []  Other: Position:  Location:    []  Vasopneumatic Device Pressure:       [] lo [] med [] hi   Temperature: [] lo [] med [] hi   [x] Skin assessment post-treatment:  [x]intact []redness- no adverse reaction    []redness - adverse reaction:       42 min Therapeutic Exercise:  [x] See flow sheet :   Rationale: increase ROM, increase strength and improve coordination to improve the patients ability to return to normal activity  4 min Manual Therapy:  KT I-Strip for spacing at the anterior deltoid    Rationale: decrease pain, increase ROM, increase tissue extensibility and decrease trigger points to tolerate increased activity   With   [x] TE   [] TA   [] neuro   [] other: Patient Education: [x] Review HEP    [] Progressed/Changed HEP based on:   [] positioning   [] body mechanics   [] transfers   [] heat/ice application    [] other:      Other Objective/Functional Measures:   - AROM Right Shoulder Flx 158      Pain Level (0-10 scale) post treatment: 0    ASSESSMENT/Changes in Function:   - Increased AROM with less discomfort    Patient will continue to benefit from skilled PT services to modify and progress therapeutic interventions, address functional mobility deficits, address ROM deficits, address strength deficits, analyze and address soft tissue restrictions and analyze and cue movement patterns to attain remaining goals.      []  See Plan of Care  []  See progress note/recertification  []  See Discharge Summary         Progress towards goals / Updated goals:  Short Term Goals: To be accomplished in 5 treatments:  1.  Pt will be compliant and independent with HEP in order to facilitate PT sessions and aid with self management              Eval Status:  Initiated              EDQFTIA Status: Met, pt reports compliance 7/15/19  2.  Pt to tolerate 30 min or more of TE and/or Interventions w/o increased s/s              Eval Status:  Initiated              SUHICLN Status: Met at 55 min today 7/15/19     Long Term Goals: To be accomplished in 10 treatments:  1.  Pt will report 50% improvement in fear of using the right UE to aid in improved function for light to moderate use of the right UE at or above shoulder level for improved function with daily activity              Eval Status:  Initiated              TZEMFQT Status:  2.  Pt will reach overhead with pain 2/10 or better with light activity for progress to normal shoulder mobiltiy               Eval Status:  Unable to reach overhead              Current Status: Full ROM in Flx at this time with pain 2/10 7/29/19  3.  Pt will demonstrate AROM right Shoulder flx/ABD to 120* to perform light activity at shoulder level with little discomfort               Eval Status:  Flx 60 ABD 67 Ext 25               Current Status: Met at Flx 159  with pain 1/10 7/29/19  4.  Pt will have little to no hesitation with use of right arm to shoulder level for progress to full AROM to carryover to climbing ladders at [place] Status:  Initiated              FCGXQME Status: No hesitation today 7/29/19  5.  Pt will improve FOTO score to 66 in 19 visits to show significant improvement  for progress to good shoulder function              Eval Status: 45              Current Status:    PLAN  [x]  Upgrade activities as tolerated     [x]  Continue plan of care  []  Update interventions per flow sheet       []  Discharge due to:_  []  Other:_      Chava Jean Baptiste, PT 7/29/2019  5:02 PM    Future Appointments   Date Time Provider Austen Castellon   8/1/2019  4:00 PM Raulito Haskins, PT [facility]   8/5/2019  4:00 PM Raulito Haskins, PT [facility]   8/7/2019  4:00 PM Raulito Haskins, PT [facility]   8/12/2019  4:30 PM Raulito Haskins, PT [facility]   8/13/2019  4:00 PM Raulito Haskins, PT [facility]   8/15/2019  4:00 PM Raulito Haskins, PT [facility]

## 2019-08-01 ENCOUNTER — HOSPITAL ENCOUNTER (OUTPATIENT)
Dept: PHYSICAL THERAPY | Age: 44
Discharge: HOME | End: 2019-08-01
Payer: OTHER GOVERNMENT

## 2019-08-01 PROCEDURE — 97035 APP MDLTY 1+ULTRASOUND EA 15: CPT

## 2019-08-01 PROCEDURE — 97110 THERAPEUTIC EXERCISES: CPT

## 2019-08-01 NOTE — PROGRESS NOTES
PT DAILY TREATMENT NOTE 10-18    Patient Name: Pk Barrow  Date:2019  : 1975  [x]  Patient  Verified  Payor: /    In time:4:40  Out time:5:31  Total Treatment Time (min): 50  Visit #: 5 of 10    Medicare/BCBS Only   Total Timed Codes (min):    1:1 Treatment Time:          Treatment Area: Bicipital tendinitis, right shoulder [M75.21]  Other specified postprocedural states [Z98.890]    SUBJECTIVE  Pain Level (0-10 scale): 0  Any medication changes, allergies to medications, adverse drug reactions, diagnosis change, or new procedure performed?: [x] No    [] Yes (see summary sheet for update)  Subjective functional status/changes:   [] No changes reported  Feeling great. No pain.   Shoulder is doing good today    OBJECTIVE    Modality rationale: decrease inflammation, decrease pain and increase tissue extensibility to improve the patients ability to return to normal   Min Type Additional Details    [] Estim:  []Unatt       []IFC  []Premod                        []Other:  []w/ice   []w/heat  Position:  Location:    [] Estim: []Att    []TENS instruct  []NMES                    []Other:  []w/US   []w/ice   []w/heat  Position:  Location:    []  Traction: [] Cervical       []Lumbar                       [] Prone          []Supine                       []Intermittent   []Continuous Lbs:  [] before manual  [] after manual   8 [x]  Ultrasound: [x]Continuous   [] Pulsed                           [x]1MHz   []3MHz W/cm2: 1.7  Location: Right Anterior deltoid and biceps tendon region    []  Iontophoresis with dexamethasone         Location: [] Take home patch   [] In clinic    []  Ice     []  heat  []  Ice massage  []  Laser   []  Anodyne Position:  Location:    []  Laser with stim  []  Other:  Position:  Location:    []  Vasopneumatic Device Pressure:       [] lo [] med [] hi   Temperature: [] lo [] med [] hi   [] Skin assessment post-treatment:  []intact []redness- no adverse reaction    26 min Therapeutic Exercise:  [x] See flow sheet :   Rationale: increase ROM, increase strength and improve coordination to improve the patients ability to return to normal activity  4 min Manual Therapy:  KT I-Strip for spacing at the anterior deltoid    Rationale: decrease pain, increase ROM, increase tissue extensibility and decrease trigger points to tolerate increased activity       With   [x] TE   [] TA   [] neuro   [] other: Patient Education: [x] Review HEP    [] Progressed/Changed HEP based on:   [] positioning   [] body mechanics   [] transfers   [] heat/ice application    [] other:      Other Objective/Functional Measures:       Pain Level (0-10 scale) post treatment: 0    ASSESSMENT/Changes in Function:   - Pt showing good shoulder strength and mobility with added resistance      Patient will continue to benefit from skilled PT services to modify and progress therapeutic interventions, address functional mobility deficits, address ROM deficits, address strength deficits, analyze and address soft tissue restrictions and analyze and cue movement patterns to attain remaining goals.      []  See Plan of Care  []  See progress note/recertification  []  See Discharge Summary         Progress towards goals / Updated goals:  Short Term Goals: To be accomplished in 5 treatments:  1.  Pt will be compliant and independent with HEP in order to facilitate PT sessions and aid with self management              Eval Status:  Initiated              AYIESNK Status: Met, pt reports compliance 7/15/19  2.  Pt to tolerate 30 min or more of TE and/or Interventions w/o increased s/s              Eval Status:  Initiated              EEEGOIA Status: Met at 55 min today 7/15/19     Long Term Goals: To be accomplished in 10 treatments:  1.  Pt will report 50% improvement in fear of using the right UE to aid in improved function for light to moderate use of the right UE at or above shoulder level for improved function with daily activity              Eval Status:  Initiated              AFKNBIC Status:  2.  Pt will reach overhead with pain 2/10 or better with light activity for progress to normal shoulder mobiltiy               Eval Status:  Unable to reach overhead              Current Status: Full ROM in Flx at this time with pain 2/10 7/29/19  3.  Pt will demonstrate AROM right Shoulder flx/ABD to 120* to perform light activity at shoulder level with little discomfort               Eval Status:  Flx 60 ABD 67 Ext 25               Current Status: Met at Flx 159  with pain 1/10 7/29/19  4.  Pt will have little to no hesitation with use of right arm to shoulder level for progress to full AROM to carryover to climbing ladders at [facility] Status:  Initiated              FFUPXJT Status: No hesitation today 7/29/19  5.  Pt will improve FOTO score to 66 in 19 visits to show significant improvement  for progress to good shoulder function              Eval Status: 45              Current Status:       PLAN  [x]  Upgrade activities as tolerated     [x]  Continue plan of care  []  Update interventions per flow sheet       []  Discharge due to:_  []  Other:_      Marques Gutierrez, PT 8/1/2019  4:43 PM    Future Appointments   Date Time Provider Austen Castellon   8/5/2019  4:00 PM Garry Prasad [facility]   8/7/2019  4:00 PM Lawrence Iniguez PT [facility]   8/12/2019  4:30 PM Lawrence Iniguez PT [facility]   8/13/2019  4:00 PM Lawrence Iniguez PT [facility]   8/15/2019  4:00 PM Lawrence Iniguez PT [facility]

## 2019-08-05 ENCOUNTER — HOSPITAL ENCOUNTER (OUTPATIENT)
Dept: PHYSICAL THERAPY | Age: 44
Discharge: HOME | End: 2019-08-05
Payer: OTHER GOVERNMENT

## 2019-08-05 PROCEDURE — 97110 THERAPEUTIC EXERCISES: CPT

## 2019-08-05 NOTE — PROGRESS NOTES
PT DAILY TREATMENT NOTE 10-18    Patient Name: Margo Ozuna  Date:2019  : 1975  [x]  Patient  Verified  Payor: /    In time:3:54  Out time:4:30  Total Treatment Time (min): 29  Visit #:  6 of 10    Medicare/[facility] Only   Total Timed Codes (min):    1:1 Treatment Time:          Treatment Area: Bicipital tendinitis, right shoulder [M75.21]  Other specified postprocedural states [Z98.890]    SUBJECTIVE  Pain Level (0-10 scale): 0  Any medication changes, allergies to medications, adverse drug reactions, diagnosis change, or new procedure performed?: [x] No    [] Yes (see summary sheet for update)  Subjective functional status/changes:   [] No changes reported  Doing good, work is easier with less discomfort. Feeling better    OBJECTIVE        29 min Therapeutic Exercise:  [x] See flow sheet :   Rationale: increase ROM, increase strength and improve coordination to improve the patients ability to return to work with no difficulty          With   [x] TE   [] TA   [] neuro   [] other: Patient Education: [x] Review HEP    [] Progressed/Changed HEP based on:   [] positioning   [] body mechanics   [] transfers   [] heat/ice application    [] other:      Other Objective/Functional Measures:        Pain Level (0-10 scale) post treatment: 0    ASSESSMENT/Changes in Function:    - Lift waist to overhead with 10# and no difficulty    Patient will continue to benefit from skilled PT services to modify and progress therapeutic interventions, address functional mobility deficits, address ROM deficits, address strength deficits, analyze and address soft tissue restrictions and analyze and cue movement patterns to attain remaining goals.      []  See Plan of Care  []  See progress note/recertification  []  See Discharge Summary         Progress towards goals / Updated goals:  Short Term Goals: To be accomplished in 5 treatments:  1.  Pt will be compliant and independent with HEP in order to facilitate PT sessions and aid with self management              Eval Status:  Initiated              ESAGGNQ Status: Met, pt reports compliance 7/15/19  2.  Pt to tolerate 30 min or more of TE and/or Interventions w/o increased s/s              Eval Status:  Initiated              UDPOGVU Status: Met at 55 min today 7/15/19     Long Term Goals: To be accomplished in 10 treatments:  1.  Pt will report 50% improvement in fear of using the right UE to aid in improved function for light to moderate use of the right UE at or above shoulder level for improved function with daily activity              Eval Status:  Initiated              CQZCNKT Status:  2.  Pt will reach overhead with pain 2/10 or better with light activity for progress to normal shoulder mobiltiy               Eval Status:  Unable to reach overhead              Current Status: Full ROM in Flx at this time with pain 2/10 7/29/19  3.  Pt will demonstrate AROM right Shoulder flx/ABD to 120* to perform light activity at shoulder level with little discomfort               Eval Status:  Flx 60 ABD 67 Ext 25               Current Status: Met at Flx 159  with pain 1/10 7/29/19  4.  Pt will have little to no hesitation with use of right arm to shoulder level for progress to full AROM to carryover to climbing ladders at [facility] Status:  Initiated              CBTEION Status: Met with lifting 10# to overhead shelf with no difficulty  5.  Pt will improve FOTO score to 66 in 19 visits to show significant improvement  for progress to good shoulder function              Eval Status: 45              Current Status:    PLAN  [x]  Upgrade activities as tolerated     [x]  Continue plan of care  []  Update interventions per flow sheet       []  Discharge due to:_  []  Other:_      Ramírez Razo, PT 8/5/2019  5:22 PM    Future Appointments   Date Time Provider Austen Castellon   8/7/2019  4:00 PM Hubert Alberts [facility]   8/12/2019  4:30 PM Azeb Blakely [facility]   8/13/2019  4:00 PM Miles Radford, PT [facility]   8/15/2019  4:00 PM Miles Radford, PT [facility]

## 2019-08-07 ENCOUNTER — APPOINTMENT (OUTPATIENT)
Dept: PHYSICAL THERAPY | Age: 44
End: 2019-08-07
Payer: OTHER GOVERNMENT

## 2019-08-12 ENCOUNTER — HOSPITAL ENCOUNTER (OUTPATIENT)
Dept: PHYSICAL THERAPY | Age: 44
Discharge: HOME | End: 2019-08-12
Payer: OTHER GOVERNMENT

## 2019-08-12 PROCEDURE — 97110 THERAPEUTIC EXERCISES: CPT

## 2019-08-12 NOTE — PROGRESS NOTES
PT DAILY TREATMENT NOTE 10-18    Patient Name: Royal Gonzales  Date:2019  : 1975  [x]  Patient  Verified  Payor: /    In time:4:31  Out time:5:24  Total Treatment Time (min): 53  Visit #: 7 of 10    Medicare/BCBS Only   Total Timed Codes (min):    1:1 Treatment Time:          Treatment Area: Bicipital tendinitis, right shoulder [M75.21]  Other specified postprocedural states [Z98.890]    SUBJECTIVE  Pain Level (0-10 scale): 0. Not think  Any medication changes, allergies to medications, adverse drug reactions, diagnosis change, or new procedure performed?: [x] No    [] Yes (see summary sheet for update)  Subjective functional status/changes:   [] No changes reported  Pt reports that he is throwing the ball with dog while using his right hand, Not thinking about not using the right hand    OBJECTIVE      53 min Therapeutic Exercise:  [x] See flow sheet :   Rationale: increase ROM, increase strength and improve coordination to improve the patients ability to return to normal activity    With   [x] TE   [] TA   [] neuro   [] other: Patient Education: [x] Review HEP    [] Progressed/Changed HEP based on:   [] positioning   [] body mechanics   [] transfers   [] heat/ice application    [] other:      Other Objective/Functional Measures:   - MMT right shoulder F/E/ADD 5/5  ABD 4+/5    Pain Level (0-10 scale) post treatment: 0    ASSESSMENT/Changes in Function:   - Good strength and mobility with minor weakness right shoulder ABD  - finish last two visits with continuedl strength training to improve right shoulder abduction strength    Patient will continue to benefit from skilled PT services to modify and progress therapeutic interventions, address functional mobility deficits, address ROM deficits, address strength deficits, analyze and address soft tissue restrictions and analyze and cue movement patterns to attain remaining goals.      []  See Plan of Care  []  See progress note/recertification  []  See Discharge Summary         Progress towards goals / Updated goals:  Short Term Goals: To be accomplished in 5 treatments:  1.  Pt will be compliant and independent with HEP in order to facilitate PT sessions and aid with self management              Eval Status:  Initiated              AWLNPIT Status: Met, pt reports compliance 7/15/19  2.  Pt to tolerate 30 min or more of TE and/or Interventions w/o increased s/s              Eval Status:  Initiated              TCGESOO Status: Met at 55 min today 7/15/19     Long Term Goals: To be accomplished in 10 treatments:  1.  Pt will report 50% improvement in fear of using the right UE to aid in improved function for light to moderate use of the right UE at or above shoulder level for improved function with daily activity              Eval Status:  Initiated              CRTHPGE Status: Met at 50% today 8/12/19  2.  Pt will reach overhead with pain 2/10 or better with light activity for progress to normal shoulder mobiltiy               Eval Status:  Unable to reach overhead              Current Status: Full ROM in Flx at this time with pain 2/10 7/29/19  3.  Pt will demonstrate AROM right Shoulder flx/ABD to 120* to perform light activity at shoulder level with little discomfort               Eval Status:  Flx 60 ABD 67 Ext 25               Current Status: Met at Flx 159  with pain 1/10 7/29/19  4.  Pt will have little to no hesitation with use of right arm to shoulder level for progress to full AROM to carryover to climbing ladders at [place] Status:  Initiated              FDGCKFP Status: Met with lifting 10# to overhead shelf with no difficulty 8/12/19  5.  Pt will improve FOTO score to 66 in 19 visits to show significant improvement  for progress to good shoulder function              Eval Status: 45              Current Status: Met at 100    PLAN  [x]  Upgrade activities as tolerated     [x]  Continue plan of care  []  Update interventions per flow sheet       []  Discharge due to:_  []  Other:_      Carmina Grant, PT 8/12/2019  4:59 PM    Future Appointments   Date Time Provider Austen Castellon   8/13/2019  4:00 PM Josue Beckman, [place] [facility]   8/15/2019  4:30 PM Josue Beckman, PT [facility]

## 2019-08-13 ENCOUNTER — HOSPITAL ENCOUNTER (OUTPATIENT)
Dept: PHYSICAL THERAPY | Age: 44
End: 2019-08-13

## 2019-08-15 ENCOUNTER — APPOINTMENT (OUTPATIENT)
Dept: PHYSICAL THERAPY | Age: 44
End: 2019-08-15

## 2019-08-17 NOTE — PROGRESS NOTES
[facility] at [address] [facility], [facility]  Phone: [phone]      Fax:  [phone]    Discharge Summary    Patient name: Yanet Lin Start of Care: 2019   Referral source: Kayleen Delacruz MD : 1975               Medical Diagnosis: Bicipital tendinitis, right shoulder [M75.21]  Other specified postprocedural states [Z98.890]  Payor: [address] [place] [address] / Plan: [employer] [facility] / Product Type:  Workers Comp /  Onset Date:2019               Treatment Diagnosis: Right RTC muscle strain, Biceps Tendon dysfunction, right neural tension Axillary Nerve pattern   Prior Hospitalization: see medical history Provider#: [id number]   Medications: Verified on Patient summary List    Comorbidities: None noted   Prior Level of Function: Able to climb a ladder at work with no difficulty    Visits from Start of Care: 7    Missed Visits: 4    Reporting Period : 19 to 19      Progress towards goals / Updated goals:  Short Term Goals: To be accomplished in 5 treatments:  1.  Pt will be compliant and independent with HEP in order to facilitate PT sessions and aid with self management              Eval Status:  Initiated              RJYQXBA Status: Met, pt reports compliance 7/15/19  2.  Pt to tolerate 30 min or more of TE and/or Interventions w/o increased s/s              Eval Status:  Initiated              XZRGDOD Status: Met at 55 min today 7/15/19     Long Term Goals: To be accomplished in 10 treatments:  1.  Pt will report 50% improvement in fear of using the right UE to aid in improved function for light to moderate use of the right UE at or above shoulder level for improved function with daily activity              Eval Status:  Initiated              QAGRXAM Status: Met at 50% today 19  2.  Pt will reach overhead with pain 2/10 or better with light activity for progress to normal shoulder mobiltiy               Eval Status:  Unable to reach overhead              Current Status: Met at Full ROM in Flx at this time with pain 2/10 7/29/19  3.  Pt will demonstrate AROM right Shoulder flx/ABD to 120* to perform light activity at shoulder level with little discomfort               Eval Status:  Flx 60 ABD 67 Ext 25               Current Status: Met at Flx 159  with pain 1/10 7/29/19  4.  Pt will have little to no hesitation with use of right arm to shoulder level for progress to full AROM to carryover to climbing ladders at [facility] Status:  Initiated              NYREFPY Status: Met with lifting 10# to overhead shelf with no difficulty 8/12/19  5.  Pt will improve FOTO score to 66 in 19 visits to show significant improvement  for progress to good shoulder function              Eval Status: 45              Current Status: Met at 100      Assessment/ Summary of Care: Patient has shown good progress with this treatment program. Pain as of last visit was 0/10. Patient has shown decreased pain and increased strength and mobility. Patient reports 50% improvement with overall involvement. FOTO score is 100. All STG/LTGs achieved as identified below.     Fall Risk Assessment: Patient demonstrates no Fall Risk     RECOMMENDATIONS:  [x]Discontinue therapy: [x]Patient has reached or is progressing toward set goals      []Patient is non-compliant or has abdicated      []Due to lack of appreciable progress towards set goals    Doroteo Lopez, PT 8/17/2019 12:59 PM

## 2019-08-20 ENCOUNTER — OFFICE VISIT (OUTPATIENT)
Dept: ORTHOPEDIC SURGERY | Facility: CLINIC | Age: 44
End: 2019-08-20

## 2019-08-20 VITALS
HEIGHT: 74 IN | TEMPERATURE: 97 F | SYSTOLIC BLOOD PRESSURE: 140 MMHG | DIASTOLIC BLOOD PRESSURE: 81 MMHG | BODY MASS INDEX: 39.66 KG/M2 | RESPIRATION RATE: 17 BRPM | WEIGHT: 309 LBS | OXYGEN SATURATION: 93 % | HEART RATE: 74 BPM

## 2019-08-20 DIAGNOSIS — M75.21 BICEPS TENDONITIS ON RIGHT: Primary | ICD-10-CM

## 2019-08-20 NOTE — PROGRESS NOTES
1. Have you been to the ER, urgent care clinic since your last visit? Hospitalized since your last visit? No    2. Have you seen or consulted any other health care providers outside of the [address] [place] [address] since your last visit? Include any pap smears or colon screening.  No
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
## 2019-08-20 NOTE — LETTER
NOTIFICATION RETURN TO WORK / SCHOOL 
 
8/20/2019 3:14 PM 
 
Mr. Royal Gonzales [address] [place] [id number] To Whom It May Concern: 
 
Royal Gonzales is currently under the care of [address] [place] [address]. He will return to work on: 8/21/2019 full duty with no restrictions. If there are questions or concerns please have the patient contact our office.  
 
 
 
Sincerely, 
 
 
Alannah Mckinley MD

## 2019-12-16 ENCOUNTER — OFFICE VISIT (OUTPATIENT)
Dept: FAMILY MEDICINE CLINIC | Age: 44
End: 2019-12-16

## 2019-12-16 VITALS
HEART RATE: 71 BPM | SYSTOLIC BLOOD PRESSURE: 128 MMHG | WEIGHT: 315 LBS | BODY MASS INDEX: 41.09 KG/M2 | TEMPERATURE: 98 F | DIASTOLIC BLOOD PRESSURE: 82 MMHG

## 2019-12-16 DIAGNOSIS — F41.9 ANXIETY: Primary | ICD-10-CM

## 2019-12-16 RX ORDER — ALPRAZOLAM 1 MG/1
1 TABLET ORAL
Qty: 6 TAB | Refills: 0 | Status: SHIPPED | OUTPATIENT
Start: 2019-12-16 | End: 2020-08-05

## 2019-12-16 NOTE — PATIENT INSTRUCTIONS

## 2019-12-16 NOTE — PROGRESS NOTES
Suly Romeo is a 53 y.o. male  presents for follow up. He has anxiety when flying. He needs something for his anxiety. No Known Allergies    Patient Active Problem List   Diagnosis Code    Heartburn R12    Chest pain, central R07.9    Neck pain M54.2    Seasonal allergic rhinitis J30.2    Smoking F17.200    Depression F32.9    Vitamin D deficiency E55.9    Obesity E66.9    Decreased libido R68.82    Recurrent depression ([facility] F33.9    Class 2 obesity due to excess calories without serious comorbidity with body mass index (BMI) of 38.0 to 38.9 in adult E66.09, Z68.38    Hypertension I10    Type 2 diabetes mellitus with hyperglycemia, without long-term current use of insulin ([facility]) E11.65    Acute pain of right shoulder M25.511    Severe obesity (BMI 35.0-39. 9) with comorbidity ([facility] [address] E66.01     Past Medical History:   Diagnosis Date    Depression     Hypertension 1/11/2018    no meds    Type 2 diabetes mellitus with hyperglycemia, without long-term current use of insulin ([facility] [address] 5/29/2018     Social History     Socioeconomic History    Marital status:      Spouse name: Not on file    Number of children: Not on file    Years of education: Not on file    Highest education level: Not on file   Tobacco Use    Smoking status: Current Every Day Smoker     Packs/day: 1.00     Years: 15.00     Pack years: 15.00     Types: Cigarettes    Smokeless tobacco: Former User   Substance and Sexual Activity    Alcohol use: Yes     Comment: socially    Drug use: Yes     Types: Marijuana     Comment: in the past     No family history on file. Review of Systems   Constitutional: Negative for chills, fever, malaise/fatigue and weight loss. Eyes: Negative for blurred vision. Respiratory: Negative for shortness of breath and wheezing. Cardiovascular: Negative for chest pain. Gastrointestinal: Negative for nausea and vomiting. Musculoskeletal: Negative for myalgias.    Skin: Negative for rash.   Neurological: Negative for weakness. Psychiatric/Behavioral: Negative for depression, hallucinations, substance abuse and suicidal ideas. The patient is not nervous/anxious. Vitals:    12/16/19 1548   BP: 128/82   Pulse: 71   Temp: 98 °F (36.7 °C)   Weight: 320 lb (145.2 kg)   PainSc:   0 - No pain       Physical Exam  Vitals signs and nursing note reviewed. Neck:      Musculoskeletal: Normal range of motion and neck supple. Thyroid: No thyromegaly. Cardiovascular:      Rate and Rhythm: Normal rate and regular rhythm. Heart sounds: Normal heart sounds. Pulmonary:      Effort: Pulmonary effort is normal.      Breath sounds: Normal breath sounds. Musculoskeletal: Normal range of motion. Skin:     General: Skin is warm and dry. Neurological:      Mental Status: He is alert and oriented to person, place, and time. Psychiatric:         Mood and Affect: Mood normal.         Behavior: Behavior normal.         Thought Content: Thought content normal.         Judgment: Judgment normal.         Assessment/Plan      ICD-10-CM ICD-9-CM    1. Anxiety F41.9 300.00 ALPRAZolam (XANAX) 1 mg tablet     Will give 6 tabs for airplane trip    I have discussed the diagnosis with the patient and the intended plan of care as seen in the above orders. The patient has received an after-visit summary and questions were answered concerning future plans. I have discussed medication, side effects, and warnings with the patient in detail. The patient verbalized understanding and is in agreement with the plan of care. The patient will contact the office with any additional concerns.     lab results and schedule of future lab studies reviewed with patient    Isidro Recinos MD

## 2020-04-01 ENCOUNTER — HOSPITAL ENCOUNTER (OUTPATIENT)
Dept: LAB | Age: 45
Discharge: HOME | End: 2020-04-01
Payer: OTHER GOVERNMENT

## 2020-04-01 ENCOUNTER — OFFICE VISIT (OUTPATIENT)
Dept: FAMILY MEDICINE CLINIC | Age: 45
End: 2020-04-01

## 2020-04-01 VITALS
WEIGHT: 315 LBS | DIASTOLIC BLOOD PRESSURE: 86 MMHG | RESPIRATION RATE: 14 BRPM | TEMPERATURE: 97.9 F | HEIGHT: 74 IN | BODY MASS INDEX: 40.43 KG/M2 | SYSTOLIC BLOOD PRESSURE: 128 MMHG | HEART RATE: 68 BPM

## 2020-04-01 DIAGNOSIS — R10.9 ABDOMINAL PAIN, UNSPECIFIED ABDOMINAL LOCATION: ICD-10-CM

## 2020-04-01 DIAGNOSIS — R73.9 ELEVATED BLOOD SUGAR: Primary | ICD-10-CM

## 2020-04-01 DIAGNOSIS — R73.9 ELEVATED BLOOD SUGAR: ICD-10-CM

## 2020-04-01 LAB
ALBUMIN SERPL-MCNC: 3.4 G/DL (ref 3.4–5)
ALBUMIN/GLOB SERPL: 1.1 {RATIO} (ref 0.8–1.7)
ALP SERPL-CCNC: 103 U/L (ref 45–117)
ALT SERPL-CCNC: 24 U/L (ref 16–61)
ANION GAP SERPL CALC-SCNC: 2 MMOL/L (ref 3–18)
AST SERPL-CCNC: 13 U/L (ref 10–38)
BILIRUB SERPL-MCNC: 0.4 MG/DL (ref 0.2–1)
BUN SERPL-MCNC: 13 MG/DL (ref 7–18)
BUN/CREAT SERPL: 15 (ref 12–20)
CALCIUM SERPL-MCNC: 8.5 MG/DL (ref 8.5–10.1)
CHLORIDE SERPL-SCNC: 110 MMOL/L (ref 100–111)
CO2 SERPL-SCNC: 28 MMOL/L (ref 21–32)
CREAT SERPL-MCNC: 0.89 MG/DL (ref 0.6–1.3)
EST. AVERAGE GLUCOSE BLD GHB EST-MCNC: 131 MG/DL
GLOBULIN SER CALC-MCNC: 3.2 G/DL (ref 2–4)
GLUCOSE SERPL-MCNC: 117 MG/DL (ref 74–99)
HBA1C MFR BLD: 6.2 % (ref 4.2–5.6)
POTASSIUM SERPL-SCNC: 4.3 MMOL/L (ref 3.5–5.5)
PROT SERPL-MCNC: 6.6 G/DL (ref 6.4–8.2)
SODIUM SERPL-SCNC: 140 MMOL/L (ref 136–145)

## 2020-04-01 PROCEDURE — 83036 HEMOGLOBIN GLYCOSYLATED A1C: CPT

## 2020-04-01 PROCEDURE — 80053 COMPREHEN METABOLIC PANEL: CPT

## 2020-04-01 PROCEDURE — 36415 COLL VENOUS BLD VENIPUNCTURE: CPT

## 2020-04-01 RX ORDER — OMEPRAZOLE 20 MG/1
CAPSULE, DELAYED RELEASE ORAL
Qty: 30 CAP | Refills: 2 | Status: SHIPPED | OUTPATIENT
Start: 2020-04-01 | End: 2021-05-03 | Stop reason: SDUPTHER

## 2020-04-01 RX ORDER — METFORMIN HYDROCHLORIDE 500 MG/1
500 TABLET ORAL
Qty: 30 TAB | Refills: 3 | Status: SHIPPED | OUTPATIENT
Start: 2020-04-01 | End: 2021-05-03 | Stop reason: SDUPTHER

## 2020-04-01 NOTE — PROGRESS NOTES
Patient here for follow up and medication refills. He has been out of his Metformin x 1 year. 1. Have you been to the ER, urgent care clinic since your last visit? Hospitalized since your last visit? Seen at [facility] in [facility] for cough  2. Have you seen or consulted any other health care providers outside of the [address] [place] [address] since your last visit? Include any pap smears or colon screening. No    Medication reconciliation has been completed with patient. Care team discussed/updated as well as pharmacy.     Health Maintenance Due   Topic Date Due    Pneumococcal 0-64 years (1 of 1 - PPSV23) 03/30/1979    Foot Exam Q1  03/30/1983    MICROALBUMIN Q1  03/30/1983    Eye Exam Retinal or Dilated  03/30/1983    Lipid Screen  08/12/2015    A1C test (Diabetic or Prediabetic)  07/16/2019

## 2020-04-01 NOTE — PATIENT INSTRUCTIONS
Learning About High Blood Sugar What is high blood sugar? Your body turns the food you eat into glucose (sugar), which it uses for energy. But if your body isn't able to use the sugar right away, it can build up in your blood and lead to high blood sugar. When the amount of sugar in your blood stays too high for too much of the time, you may have diabetes. Diabetes is a disease that can cause serious health problems. The good news is that lifestyle changes may help you get your blood sugar back to normal and avoid or delay diabetes. What causes high blood sugar? Sugar (glucose) can build up in your blood if you: · Are overweight. · Have a family history of diabetes. · Take certain medicines, such as steroids. What are the symptoms? Having high blood sugar may not cause any symptoms at all. Or it may make you feel very thirsty or very hungry. You may also urinate more often than usual, have blurry vision, or lose weight without trying. How is high blood sugar treated? You can take steps to lower your blood sugar level if you understand what makes it get higher. Your doctor may want you to learn how to test your blood sugar level at home. Then you can see how illness, stress, or different kinds of food or medicine raise or lower your blood sugar level. Other tests may be needed to see if you have diabetes. How can you prevent high blood sugar? · Watch your weight. If you're overweight, losing just a small amount of weight may help. Reducing fat around your waist is most important. · Limit the amount of calories, sweets, and unhealthy fat you eat. Ask your doctor if a dietitian can help you. A registered dietitian can help you create meal plans that fit your lifestyle. · Get at least 30 minutes of exercise on most days of the week. Exercise helps control your blood sugar. It also helps you maintain a healthy weight. Walking is a good choice.  You also may want to do other activities, such as running, swimming, cycling, or playing tennis or team sports. · If your doctor prescribed medicines, take them exactly as prescribed. Call your doctor if you think you are having a problem with your medicine. You will get more details on the specific medicines your doctor prescribes. Follow-up care is a key part of your treatment and safety. Be sure to make and go to all appointments, and call your doctor if you are having problems. It's also a good idea to know your test results and keep a list of the medicines you take. Where can you learn more? Go to http://delonte-antonio.info/ Enter O108 in the search box to learn more about \"Learning About High Blood Sugar. \" Current as of: December 19, 2019Content Version: 12.4 © 8215-1827 [employer]. Care instructions adapted under license by [employer] (which disclaims liability or warranty for this information). If you have questions about a medical condition or this instruction, always ask your healthcare professional. [address] any warranty or liability for your use of this information.

## 2020-04-01 NOTE — PROGRESS NOTES
Arely Garrison is a 50 y.o. male  presents for DM. No new complaints. No chest pains or SOB. He needs refills for gerd also. No Known Allergies  Outpatient Medications Marked as Taking for the 4/1/20 encounter (Office Visit) with Andreas Gilman MD   Medication Sig Dispense Refill    metFORMIN (GLUCOPHAGE) 500 mg tablet Take 1 Tab by mouth daily (with breakfast). 30 Tab 3    omeprazole (PRILOSEC) 20 mg capsule TAKE ONE CAPSULE BY MOUTH DAILY  DAYS 30 Cap 2    ALPRAZolam (XANAX) 1 mg tablet Take 1 Tab by mouth two (2) times daily as needed for Anxiety. Max Daily Amount: 2 mg. 6 Tab 0    cholecalciferol (VITAMIN D3) 50,000 unit capsule Take 1 Cap by mouth every seven (7) days. Indications: Vitamin D Deficiency 8 Cap 0    Blood-Glucose Meter monitoring kit Use bid as directed 1 Kit 0    glucose blood VI test strips (BLOOD GLUCOSE TEST) strip Use bid. 100 Strip 2     Patient Active Problem List   Diagnosis Code    Heartburn R12    Chest pain, central R07.9    Neck pain M54.2    Seasonal allergic rhinitis J30.2    Smoking F17.200    Depression F32.9    Vitamin D deficiency E55.9    Obesity E66.9    Decreased libido R68.82    Recurrent depression ([facility]) F33.9    Class 2 obesity due to excess calories without serious comorbidity with body mass index (BMI) of 38.0 to 38.9 in adult E66.09, Z68.38    Hypertension I10    Type 2 diabetes mellitus with hyperglycemia, without long-term current use of insulin ([facility]) E11.65    Acute pain of right shoulder M25.511    Severe obesity (BMI 35.0-39. 9) with comorbidity ([address] E66.01     Past Medical History:   Diagnosis Date    Depression     Hypertension 1/11/2018    no meds    Type 2 diabetes mellitus with hyperglycemia, without long-term current use of insulin ([address] 5/29/2018     Social History     Socioeconomic History    Marital status:      Spouse name: Not on file    Number of children: Not on file    Years of education: Not on file    Highest education level: Not on file   Tobacco Use    Smoking status: Current Every Day Smoker     Packs/day: 1.00     Years: 15.00     Pack years: 15.00     Types: Cigarettes    Smokeless tobacco: Former User   Substance and Sexual Activity    Alcohol use: Yes     Comment: socially    Drug use: Yes     Types: Marijuana     Comment: in the past     History reviewed. No pertinent family history. Review of Systems   Constitutional: Negative for chills, fever, malaise/fatigue and weight loss. Eyes: Negative for blurred vision. Respiratory: Negative for shortness of breath and wheezing. Cardiovascular: Negative for chest pain. Gastrointestinal: Negative for nausea and vomiting. Musculoskeletal: Negative for myalgias. Skin: Negative for rash. Neurological: Negative for weakness. Vitals:    04/01/20 1053   BP: 128/86   Pulse: 68   Resp: 14   Temp: 97.9 °F (36.6 °C)   TempSrc: Oral   Weight: 318 lb (144.2 kg)   Height: 6' 2\" (1.88 m)   PainSc:   0 - No pain       Physical Exam  Vitals signs and nursing note reviewed. Neck:      Musculoskeletal: Normal range of motion and neck supple. Thyroid: No thyromegaly. Cardiovascular:      Rate and Rhythm: Normal rate and regular rhythm. Heart sounds: Normal heart sounds. Pulmonary:      Effort: Pulmonary effort is normal.      Breath sounds: Normal breath sounds. Musculoskeletal: Normal range of motion. Skin:     General: Skin is warm and dry. Neurological:      Mental Status: He is alert and oriented to person, place, and time. Psychiatric:         Mood and Affect: Mood normal.         Behavior: Behavior normal.         Thought Content: Thought content normal.         Judgment: Judgment normal.         Assessment/Plan      ICD-10-CM ICD-9-CM    1. Elevated blood sugar R73.9 790.29 metFORMIN (GLUCOPHAGE) 500 mg tablet      METABOLIC PANEL, COMPREHENSIVE      HEMOGLOBIN A1C WITH EAG   2.  Abdominal pain, unspecified abdominal location R10.9 789.00 omeprazole (PRILOSEC) 20 mg capsule     Follow-up and Dispositions    · Return in about 3 months (around 7/1/2020). I have discussed the diagnosis with the patient and the intended plan of care as seen in the above orders. The patient has received an after-visit summary and questions were answered concerning future plans. I have discussed medication, side effects, and warnings with the patient in detail. The patient verbalized understanding and is in agreement with the plan of care. The patient will contact the office with any additional concerns.       lab results and schedule of future lab studies reviewed with patient    Isac Riojas MD

## 2020-04-01 NOTE — LETTER
4/1/2020 11:16 AM 
 
Mr. Herb Cui [address] [place] [phone] To whom this may concern: Mr. Saji Bolden is currently under my care for Type 2 Diabetes and is on Metformin for treatment of this. If you have any further questions or concerns please have patient contact my office. Sincerely, Linda Ward MD

## 2020-04-10 NOTE — PROGRESS NOTES
Called patient  verified he has been made aware of his lab results as well as need for better sugar control advised he try to follow low carb diet and watch his sugar intake. Dr. Nam Masters will re-check his levels as appropriate. Patient has expressed understanding and did not have any further questions.

## 2020-08-05 ENCOUNTER — HOSPITAL ENCOUNTER (EMERGENCY)
Age: 45
Discharge: HOME | End: 2020-08-05
Attending: EMERGENCY MEDICINE
Payer: OTHER GOVERNMENT

## 2020-08-05 ENCOUNTER — APPOINTMENT (OUTPATIENT)
Dept: GENERAL RADIOLOGY | Age: 45
End: 2020-08-05
Attending: EMERGENCY MEDICINE
Payer: OTHER GOVERNMENT

## 2020-08-05 VITALS
OXYGEN SATURATION: 99 % | SYSTOLIC BLOOD PRESSURE: 134 MMHG | TEMPERATURE: 98.4 F | WEIGHT: 295 LBS | RESPIRATION RATE: 16 BRPM | HEART RATE: 66 BPM | DIASTOLIC BLOOD PRESSURE: 95 MMHG | BODY MASS INDEX: 37.88 KG/M2

## 2020-08-05 DIAGNOSIS — S93.401A SPRAIN OF RIGHT ANKLE, UNSPECIFIED LIGAMENT, INITIAL ENCOUNTER: Primary | ICD-10-CM

## 2020-08-05 PROCEDURE — 73610 X-RAY EXAM OF ANKLE: CPT

## 2020-08-05 PROCEDURE — 99283 EMERGENCY DEPT VISIT LOW MDM: CPT

## 2020-08-05 PROCEDURE — 74011250637 HC RX REV CODE- 250/637: Performed by: EMERGENCY MEDICINE

## 2020-08-05 RX ORDER — IBUPROFEN 600 MG/1
600 TABLET ORAL
Qty: 20 TAB | Refills: 0 | Status: SHIPPED | OUTPATIENT
Start: 2020-08-05

## 2020-08-05 RX ORDER — HYDROCODONE BITARTRATE AND ACETAMINOPHEN 5; 325 MG/1; MG/1
1 TABLET ORAL ONCE
Status: COMPLETED | OUTPATIENT
Start: 2020-08-05 | End: 2020-08-05

## 2020-08-05 RX ADMIN — HYDROCODONE BITARTRATE AND ACETAMINOPHEN 1 TABLET: 5; 325 TABLET ORAL at 08:43

## 2020-08-05 NOTE — DISCHARGE INSTRUCTIONS
Patient Education        Ankle Sprain: Care Instructions  Your Care Instructions     An ankle sprain can happen when you twist your ankle. The ligaments that support the ankle can get stretched and torn. Often the ankle is swollen and painful. Ankle sprains may take from several weeks to several months to heal. Usually, the more pain and swelling you have, the more severe your ankle sprain is and the longer it will take to heal. You can heal faster and regain strength in your ankle with good home treatment. It is very important to give your ankle time to heal completely, so that you do not easily hurt your ankle again. Follow-up care is a key part of your treatment and safety. Be sure to make and go to all appointments, and call your doctor if you are having problems. It's also a good idea to know your test results and keep a list of the medicines you take. How can you care for yourself at home? · Prop up your foot on pillows as much as possible for the next 3 days. Try to keep your ankle above the level of your heart. This will help reduce the swelling. · Follow your doctor's directions for wearing a splint or elastic bandage. Wrapping the ankle may help reduce or prevent swelling. · Your doctor may give you a splint, a brace, an air stirrup, or another form of ankle support to protect your ankle until it is healed. Wear it as directed while your ankle is healing. Do not remove it unless your doctor tells you to. After your ankle has healed, ask your doctor whether you should wear the brace when you exercise. · Put ice or cold packs on your injured ankle for 10 to 20 minutes at a time. Try to do this every 1 to 2 hours for the next 3 days (when you are awake) or until the swelling goes down. Put a thin cloth between the ice and your skin. · You may need to use crutches until you can walk without pain. If you do use crutches, try to bear some weight on your injured ankle if you can do so without pain.  This helps the ankle heal.  · Take pain medicines exactly as directed. ? If the doctor gave you a prescription medicine for pain, take it as prescribed. ? If you are not taking a prescription pain medicine, ask your doctor if you can take an over-the-counter medicine. · If you have been given ankle exercises to do at home, do them exactly as instructed. These can promote healing and help prevent lasting weakness. When should you call for help? Call your doctor now or seek immediate medical care if:  · Your pain is getting worse. · Your swelling is getting worse. · Your splint feels too tight or you are unable to loosen it. Watch closely for changes in your health, and be sure to contact your doctor if:  · You are not getting better after 1 week. Where can you learn more? Go to http://www.gray.com/  Enter X418 in the search box to learn more about \"Ankle Sprain: Care Instructions. \"  Current as of: March 2, 2020               Content Version: 12.5  © 4295-8056 [employer]. Care instructions adapted under license by [employer] (which disclaims liability or warranty for this information). If you have questions about a medical condition or this instruction, always ask your healthcare professional. [place] [address] any warranty or liability for your use of this information. Patient Education        Learning About RICE (Rest, Ice, Compression, and Elevation)  What is RICE? RICE is a way to care for an injury. RICE helps relieve pain and swelling. It may also help with healing and flexibility. RICE stands for:  · R est and protect the injured or sore area. · I ce or a cold pack used as soon as possible. · C ompression, or wrapping the injured or sore area with an elastic bandage. · E levation (propping up) the injured or sore area. How do you do RICE?   You can use RICE for home treatment when you have general aches and pains or after an injury or surgery. Rest  · Do not put weight on the injury for at least 24 to 48 hours. · Use crutches for a badly sprained knee or ankle. · Support a sprained wrist, elbow, or shoulder with a sling. Ice  · Put ice or a cold pack on the injury right away to reduce pain and swelling. Frozen vegetables will also work as an ice pack. Put a thin cloth between the ice or cold pack and your skin. The cloth protects the injured area from getting too cold. · Use ice for 10 to 15 minutes at a time for the first 48 to 72 hours. Compression  · Use compression for sprains, strains, and surgeries of the arms and legs. · Wrap the injured area with an elastic bandage or compression sleeve to reduce swelling. · Don't wrap it too tightly. If the area below it feels numb, tingles, or feels cool, loosen the wrap. Elevation  · Use elevation for areas of the body that can be propped up, such as arms and legs. · Prop up the injured area on pillows whenever you use ice. Keep it propped up anytime you sit or lie down. · Try to keep the injured area at or above the level of your heart. This will help reduce swelling and bruising. Where can you learn more? Go to http://delonte-antonio.info/  Enter I463 in the search box to learn more about \"Learning About RICE (Rest, Ice, Compression, and Elevation). \"  Current as of: March 2, 2020               Content Version: 12.5  © 7781-8687 [employer]. Care instructions adapted under license by [employer] (which disclaims liability or warranty for this information). If you have questions about a medical condition or this instruction, always ask your healthcare professional. [place] [address] any warranty or liability for your use of this information.

## 2020-08-05 NOTE — LETTER
NOTIFICATION RETURN TO WORK / SCHOOL 
 
8/5/2020 Mr. Boris Barrios [address] [place] [phone] To Whom It May Concern: 
 
Boris Barrios is currently under the care of [id number] [facility] EMERGENCY DEPT. He will return to work/school on: 8/6/2020 Boris Barrios may return to work/school with the following restrictions: light duty until cleared by orthopedist. 
 
 
If there are questions or concerns please have the patient contact our office.  
 
 
 
Sincerely, 
 
 
 
 
 
Margarita Chaudhry RN

## 2020-08-05 NOTE — ED NOTES
Pt sitting up on bedside texting; instructed no driving/etoh/actaminophen products.   Pt voiced his understanding

## 2020-08-05 NOTE — ED NOTES
Theodore Callahan is a 50 y.o. male that was discharged in stable condition. The patients diagnosis, condition and treatment were explained to  patient and aftercare instructions were given. The patient verbalized understanding. Patient armband removed and shredded.
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
## 2020-08-10 ENCOUNTER — VIRTUAL VISIT (OUTPATIENT)
Dept: FAMILY MEDICINE CLINIC | Age: 45
End: 2020-08-10

## 2020-08-10 DIAGNOSIS — R19.7 DIARRHEA, UNSPECIFIED TYPE: Primary | ICD-10-CM

## 2020-08-10 RX ORDER — DIPHENOXYLATE HYDROCHLORIDE AND ATROPINE SULFATE 2.5; .025 MG/1; MG/1
1 TABLET ORAL
Qty: 12 TAB | Refills: 0 | Status: SHIPPED | OUTPATIENT
Start: 2020-08-10 | End: 2020-09-06

## 2020-08-10 NOTE — PROGRESS NOTES
Eugenio Zaragoza is a 50 y.o. male, evaluated via audio-only technology on 8/10/2020 for Diarrhea (he has had cough and sneezing   he has assoc sweating but no documented fever.)  . Assessment & Plan:   Diagnoses and all orders for this visit:    1. Diarrhea, unspecified type  -     diphenoxylate-atropine (LomotiL) 2.5-0.025 mg per tablet; Take 1 Tab by mouth four (4) times daily as needed for Diarrhea. Max Daily Amount: 4 Tabs. 12  Subjective:       Prior to Admission medications    Medication Sig Start Date End Date Taking? Authorizing Provider   diphenoxylate-atropine (LomotiL) 2.5-0.025 mg per tablet Take 1 Tab by mouth four (4) times daily as needed for Diarrhea. Max Daily Amount: 4 Tabs. 8/10/20  Yes Denice Raymond MD   ibuprofen (MOTRIN) 600 mg tablet Take 1 Tab by mouth every six (6) hours as needed for Pain. 8/5/20   Carolyn Jorge MD   metFORMIN (GLUCOPHAGE) 500 mg tablet Take 1 Tab by mouth daily (with breakfast).  4/1/20   Denice Raymond MD   omeprazole (PRILOSEC) 20 mg capsule TAKE ONE CAPSULE BY MOUTH DAILY  DAYS 4/1/20   Denice Raymond MD   Blood-Glucose Meter monitoring kit Use bid as directed 5/29/18   Denice Raymond MD   Lancets misc Use bid. 5/29/18   Denice Raymond MD   glucose blood VI test strips (BLOOD GLUCOSE TEST) strip Use bid. 5/29/18   Denice Raymond MD     Patient Active Problem List   Diagnosis Code    Heartburn R12    Chest pain, central R07.9    Neck pain M54.2    Seasonal allergic rhinitis J30.2    Smoking F17.200    Depression F32.9    Vitamin D deficiency E55.9    Obesity E66.9    Decreased libido R68.82    Recurrent depression ([facility] [address] F33.9    Class 2 obesity due to excess calories without serious comorbidity with body mass index (BMI) of 38.0 to 38.9 in adult E66.09, Z68.38    Hypertension I10    Type 2 diabetes mellitus with hyperglycemia, without long-term current use of insulin (HCC) E11.65    Acute pain of right shoulder M25.511    Severe obesity (BMI 35.0-39. 9) with comorbidity [facility] E66.01     Past Medical History:   Diagnosis Date    Depression     Hypertension 1/11/2018    no meds    Type 2 diabetes mellitus with hyperglycemia, without long-term current use of insulin ([facility] 5/29/2018       Review of Systems   Constitutional: Positive for chills. Negative for fever. Respiratory: Positive for cough. Negative for hemoptysis, sputum production, shortness of breath and wheezing. Cardiovascular: Negative for chest pain and palpitations. Gastrointestinal: Positive for diarrhea. Negative for abdominal pain, blood in stool, constipation, melena, nausea and vomiting. Skin: Negative for rash. Neurological: Negative. Psychiatric/Behavioral: Negative. No flowsheet data found. Jaclyn Sanderson, who was evaluated through a patient-initiated, synchronous (real-time) audio only encounter, and/or her healthcare decision maker, is aware that it is a billable service, with coverage as determined by his insurance carrier. He provided verbal consent to proceed: Yes. He has not had a related appointment within my department in the past 7 days or scheduled within the next 24 hours.       Total Time: minutes: 11-20 minutes    Paris Joshi MD

## 2020-08-11 ENCOUNTER — TELEPHONE (OUTPATIENT)
Dept: FAMILY MEDICINE CLINIC | Age: 45
End: 2020-08-11

## 2020-08-11 NOTE — LETTER
NOTIFICATION RETURN TO WORK  
 
8/11/2020 8:49 AM 
 
Mr. Bennett Herrera [address] [facility] [id number] To Whom It May Concern: 
 
Bennett Herrera is currently under the care of [address]. He will return to work as of 8/11/20. If there are questions or concerns please have the patient contact our office. Sincerely, Victoria Canavan, MD

## 2020-08-11 NOTE — TELEPHONE ENCOUNTER
Patient called to advise his temp for today was 97.9. He still has similar symptoms from 8/10/2020 visit with Dr. Colleen Bello. He would like a return to work on 8/12/2020, and will need a letter from Dr. Colleen Bello, if physician is okay with him going back. Patient is aware we will return call with physician response.

## 2020-09-06 ENCOUNTER — HOSPITAL ENCOUNTER (EMERGENCY)
Age: 45
Discharge: HOME | End: 2020-09-06
Attending: EMERGENCY MEDICINE
Payer: OTHER GOVERNMENT

## 2020-09-06 VITALS
SYSTOLIC BLOOD PRESSURE: 151 MMHG | DIASTOLIC BLOOD PRESSURE: 102 MMHG | RESPIRATION RATE: 20 BRPM | HEIGHT: 74 IN | HEART RATE: 71 BPM | OXYGEN SATURATION: 96 % | BODY MASS INDEX: 37.86 KG/M2 | TEMPERATURE: 97.7 F | WEIGHT: 295 LBS

## 2020-09-06 DIAGNOSIS — H60.392 ACUTE INFECTIVE OTITIS EXTERNA OF LEFT EAR: Primary | ICD-10-CM

## 2020-09-06 PROCEDURE — 99282 EMERGENCY DEPT VISIT SF MDM: CPT

## 2020-09-06 RX ORDER — IBUPROFEN 800 MG/1
800 TABLET ORAL EVERY 8 HOURS
Qty: 15 TAB | Refills: 0 | Status: SHIPPED | OUTPATIENT
Start: 2020-09-06 | End: 2020-09-11

## 2020-09-06 RX ORDER — AMOXICILLIN AND CLAVULANATE POTASSIUM 875; 125 MG/1; MG/1
1 TABLET, FILM COATED ORAL 2 TIMES DAILY
Qty: 20 TAB | Refills: 0 | Status: SHIPPED | OUTPATIENT
Start: 2020-09-06 | End: 2020-09-16

## 2020-09-06 RX ORDER — OXYCODONE AND ACETAMINOPHEN 5; 325 MG/1; MG/1
1 TABLET ORAL
Qty: 4 TAB | Refills: 0 | Status: SHIPPED | OUTPATIENT
Start: 2020-09-06 | End: 2020-09-09

## 2020-09-06 RX ORDER — COLISTIN SULFATE, NEOMYCIN SULFATE, THONZONIUM BROMIDE AND HYDROCORTISONE ACETATE 3; 3.3; .5; 1 MG/ML; MG/ML; MG/ML; MG/ML
3 SUSPENSION AURICULAR (OTIC) 4 TIMES DAILY
Qty: 5 ML | Refills: 0 | Status: SHIPPED | OUTPATIENT
Start: 2020-09-06 | End: 2020-09-13

## 2020-09-06 NOTE — DISCHARGE INSTRUCTIONS
Patient Education        Swimmer's Ear: Care Instructions  Your Care Instructions     Swimmer's ear (otitis externa) is inflammation or infection of the ear canal. This is the passage that leads from the outer ear to the eardrum. Any water, sand, or other debris that gets into the ear canal and stays there can cause swimmer's ear. Putting cotton swabs or other items in the ear to clean it can also cause this problem. Swimmer's ear can be very painful. But you can treat the pain and infection with medicines. You should feel better in a few days. Follow-up care is a key part of your treatment and safety. Be sure to make and go to all appointments, and call your doctor if you are having problems. It's also a good idea to know your test results and keep a list of the medicines you take. How can you care for yourself at home? Cleaning and care  · Use antibiotic drops as your doctor directs. · Do not insert ear drops (other than the antibiotic ear drops) or anything else into the ear unless your doctor has told you to. · Avoid getting water in the ear until the problem clears up. Use cotton lightly coated with petroleum jelly as an earplug. Do not use plastic earplugs. · Use a hair dryer set on low to carefully dry the ear after you shower. · To ease ear pain, hold a warm washcloth against your ear. · Take pain medicines exactly as directed. ? If the doctor gave you a prescription medicine for pain, take it as prescribed. ? If you are not taking a prescription pain medicine, ask your doctor if you can take an over-the-counter medicine. Inserting ear drops  · Warm the drops to body temperature by rolling the container in your hands. Or you can place it in a cup of warm water for a few minutes. · Lie down, with your ear facing up. · Place drops inside the ear. Follow your doctor's instructions (or the directions on the label) for how many drops to use.  Gently wiggle the outer ear or pull the ear up and back to help the drops get into the ear. · It's important to keep the liquid in the ear canal for 3 to 5 minutes. When should you call for help? Call your doctor now or seek immediate medical care if:    · You have a new or higher fever.     · You have new or worse pain, swelling, warmth, or redness around or behind your ear.     · You have new or increasing pus or blood draining from your ear. Watch closely for changes in your health, and be sure to contact your doctor if:    · You are not getting better after 2 days (48 hours). Where can you learn more? Go to http://www.gray.com/  Enter C706 in the search box to learn more about \"Swimmer's Ear: Care Instructions. \"  Current as of: April 15, 2020               Content Version: 12.6  © 9277-2916 [email or web address]. Care instructions adapted under license by [employer] (which disclaims liability or warranty for this information). If you have questions about a medical condition or this instruction, always ask your healthcare professional. [address] any warranty or liability for your use of this information.
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
## 2020-09-06 NOTE — ED TRIAGE NOTES
C/o left ear pain and external ear swelling x 3 days. States treating ear condition with \"swimmer's ear\" product without relief.

## 2021-05-03 ENCOUNTER — VIRTUAL VISIT (OUTPATIENT)
Dept: FAMILY MEDICINE CLINIC | Age: 46
End: 2021-05-03
Payer: OTHER GOVERNMENT

## 2021-05-03 DIAGNOSIS — K21.00 GASTROESOPHAGEAL REFLUX DISEASE WITH ESOPHAGITIS WITHOUT HEMORRHAGE: ICD-10-CM

## 2021-05-03 DIAGNOSIS — J40 BRONCHITIS: Primary | ICD-10-CM

## 2021-05-03 DIAGNOSIS — E11.65 TYPE 2 DIABETES MELLITUS WITH HYPERGLYCEMIA, WITHOUT LONG-TERM CURRENT USE OF INSULIN (HCC): ICD-10-CM

## 2021-05-03 PROCEDURE — 99214 OFFICE O/P EST MOD 30 MIN: CPT | Performed by: FAMILY MEDICINE

## 2021-05-03 RX ORDER — AZITHROMYCIN 250 MG/1
TABLET, FILM COATED ORAL
Qty: 6 TAB | Refills: 0 | Status: SHIPPED | OUTPATIENT
Start: 2021-05-03 | End: 2021-05-08

## 2021-05-03 RX ORDER — ALBUTEROL SULFATE 90 UG/1
2 AEROSOL, METERED RESPIRATORY (INHALATION)
Qty: 1 INHALER | Refills: 2 | Status: SHIPPED | OUTPATIENT
Start: 2021-05-03 | End: 2022-03-09 | Stop reason: SDUPTHER

## 2021-05-03 RX ORDER — METFORMIN HYDROCHLORIDE 500 MG/1
500 TABLET ORAL
Qty: 30 TAB | Refills: 3 | Status: SHIPPED | OUTPATIENT
Start: 2021-05-03 | End: 2022-03-09 | Stop reason: SDUPTHER

## 2021-05-03 RX ORDER — OMEPRAZOLE 20 MG/1
CAPSULE, DELAYED RELEASE ORAL
Qty: 30 CAP | Refills: 2 | Status: SHIPPED | OUTPATIENT
Start: 2021-05-03 | End: 2022-03-09

## 2021-05-03 NOTE — PROGRESS NOTES
Kenia Mckeon is a 48 y.o. male who was seen by synchronous (real-time) audio-video technology on 5/3/2021 for Cough (he has cough with sputum production. no fever or chills.  ), Shortness of Breath, Diabetes (needs refills), and GERD        Assessment & Plan:   Diagnoses and all orders for this visit:    1. Bronchitis  -     azithromycin (ZITHROMAX) 250 mg tablet; Take 2 tablets today, then take 1 tablet daily  -     albuterol (PROVENTIL HFA, VENTOLIN HFA, PROAIR HFA) 90 mcg/actuation inhaler; Take 2 Puffs by inhalation every four (4) hours as needed for Wheezing. 2. Type 2 diabetes mellitus with hyperglycemia, without long-term current use of insulin (HCC)  -     metFORMIN (GLUCOPHAGE) 500 mg tablet; Take 1 Tab by mouth daily (with breakfast). 3. Gastroesophageal reflux disease with esophagitis without hemorrhage  -     omeprazole (PRILOSEC) 20 mg capsule; TAKE ONE CAPSULE BY MOUTH DAILY  DAYS          712  Subjective:       Prior to Admission medications    Medication Sig Start Date End Date Taking? Authorizing Provider   ibuprofen (MOTRIN) 600 mg tablet Take 1 Tab by mouth every six (6) hours as needed for Pain. 8/5/20  Yes Carolyn Jorge MD   omeprazole (PRILOSEC) 20 mg capsule TAKE ONE CAPSULE BY MOUTH DAILY  DAYS 4/1/20  Yes Mallika Crockett MD   Blood-Glucose Meter monitoring kit Use bid as directed 5/29/18  Yes Mallika Crockett MD   Lancets misc Use bid. 5/29/18  Yes Mallika Crockett MD   glucose blood VI test strips (BLOOD GLUCOSE TEST) strip Use bid. 5/29/18  Yes Mallika Crockett MD   metFORMIN (GLUCOPHAGE) 500 mg tablet Take 1 Tab by mouth daily (with breakfast).  4/1/20   Mallika Crockett MD     Patient Active Problem List   Diagnosis Code    Heartburn R12    Chest pain, central R07.9    Neck pain M54.2    Seasonal allergic rhinitis J30.2    Smoking F17.200    Depression F32.9    Vitamin D deficiency E55.9    Obesity E66.9    Decreased libido R68.82    Recurrent depression ([address] F33.9    Class 2 obesity due to excess calories without serious comorbidity with body mass index (BMI) of 38.0 to 38.9 in adult E66.09, Z68.38    Hypertension I10    Type 2 diabetes mellitus with hyperglycemia, without long-term current use of insulin ([facility]) E11.65    Acute pain of right shoulder M25.511    Severe obesity (BMI 35.0-39. 9) with comorbidity ([facility] E66.01     Past Medical History:   Diagnosis Date    Depression     Diarrhea     Hypertension 1/11/2018    no meds    Type 2 diabetes mellitus with hyperglycemia, without long-term current use of insulin ([facility] 5/29/2018       Review of Systems   Constitutional: Negative for chills, fever, malaise/fatigue and weight loss. HENT: Positive for congestion. Eyes: Negative for blurred vision. Respiratory: Positive for cough, hemoptysis, sputum production, shortness of breath and wheezing. Cardiovascular: Negative for chest pain. Gastrointestinal: Negative for nausea and vomiting. Musculoskeletal: Negative for myalgias. Skin: Negative for rash. Neurological: Negative for weakness. Psychiatric/Behavioral: Negative. Objective:   No flowsheet data found. General: alert, cooperative, no distress   Mental  status: normal mood, behavior, speech, dress, motor activity, and thought processes, able to follow commands   HENT: NCAT   Neck: no visualized mass   Resp: no respiratory distress   Neuro: no gross deficits   Skin: no discoloration or lesions of concern on visible areas   Psychiatric: normal affect, consistent with stated mood, no evidence of hallucinations     Additional exam findings: We discussed the expected course, resolution and complications of the diagnosis(es) in detail. Medication risks, benefits, costs, interactions, and alternatives were discussed as indicated. I advised him to contact the office if his condition worsens, changes or fails to improve as anticipated.  He expressed understanding with the diagnosis(es) and plan. Heidy Degree, was evaluated through a synchronous (real-time) audio-video encounter. The patient (or guardian if applicable) is aware that this is a billable service. Verbal consent to proceed has been obtained within the past 12 months. The visit was conducted pursuant to the emergency declaration under the [address] [place] [address] and the [employer] and [employer] General Act. Patient identification was verified, and a caregiver was present when appropriate. The patient was located in a state where the provider was credentialed to provide care.     Olivia Tesfaye MD

## 2021-05-03 NOTE — PROGRESS NOTES
Patient c/o SOB and cough for weeks and says he coughed up some blood that was seen in his mask this AM. Patient denies any chest pain or tightness, and does use tobacco. Does not vape. 1. Have you been to the ER, urgent care clinic since your last visit? Hospitalized since your last visit? No  2. Have you seen or consulted any other health care providers outside of the [address] [place] [address] since your last visit? Include any pap smears or colon screening. No    Medication reconciliation has been completed with patient. Care team discussed/updated as well as pharmacy.     Health Maintenance Due   Topic Date Due    Pneumococcal 0-64 years (1 of 1 - PPSV23) Never done    Foot Exam Q1  Never done    MICROALBUMIN Q1  Never done    Eye Exam Retinal or Dilated  Never done    COVID-19 Vaccine (1) Never done    Lipid Screen  08/12/2015    A1C test (Diabetic or Prediabetic)  04/01/2021

## 2021-05-19 ENCOUNTER — HOSPITAL ENCOUNTER (EMERGENCY)
Age: 46
Discharge: HOME | End: 2021-05-19
Attending: EMERGENCY MEDICINE
Payer: OTHER GOVERNMENT

## 2021-05-19 ENCOUNTER — APPOINTMENT (OUTPATIENT)
Dept: CT IMAGING | Age: 46
End: 2021-05-19
Attending: PHYSICIAN ASSISTANT
Payer: OTHER GOVERNMENT

## 2021-05-19 VITALS
OXYGEN SATURATION: 96 % | TEMPERATURE: 98.6 F | BODY MASS INDEX: 39.78 KG/M2 | SYSTOLIC BLOOD PRESSURE: 144 MMHG | RESPIRATION RATE: 16 BRPM | DIASTOLIC BLOOD PRESSURE: 94 MMHG | HEIGHT: 74 IN | HEART RATE: 71 BPM | WEIGHT: 310 LBS

## 2021-05-19 DIAGNOSIS — K52.9 GASTROENTERITIS, ACUTE: Primary | ICD-10-CM

## 2021-05-19 LAB
ALBUMIN SERPL-MCNC: 3.7 G/DL (ref 3.4–5)
ALBUMIN/GLOB SERPL: 0.9 {RATIO} (ref 0.8–1.7)
ALP SERPL-CCNC: 114 U/L (ref 45–117)
ALT SERPL-CCNC: 48 U/L (ref 16–61)
ANION GAP SERPL CALC-SCNC: 8 MMOL/L (ref 3–18)
APPEARANCE UR: CLEAR
AST SERPL-CCNC: 38 U/L (ref 10–38)
BACTERIA URNS QL MICRO: NEGATIVE /HPF
BASOPHILS # BLD: 0 K/UL (ref 0–0.1)
BASOPHILS NFR BLD: 0 % (ref 0–2)
BILIRUB SERPL-MCNC: 0.4 MG/DL (ref 0.2–1)
BILIRUB UR QL: ABNORMAL
BUN SERPL-MCNC: 18 MG/DL (ref 7–18)
BUN/CREAT SERPL: 13 (ref 12–20)
C DIFF GDH STL QL: NEGATIVE
C DIFF TOX A+B STL QL IA: NEGATIVE
CALCIUM SERPL-MCNC: 8.3 MG/DL (ref 8.5–10.1)
CHLORIDE SERPL-SCNC: 102 MMOL/L (ref 100–111)
CO2 SERPL-SCNC: 29 MMOL/L (ref 21–32)
COLOR UR: ABNORMAL
CREAT SERPL-MCNC: 1.4 MG/DL (ref 0.6–1.3)
DIFFERENTIAL METHOD BLD: ABNORMAL
EOSINOPHIL # BLD: 0.1 K/UL (ref 0–0.4)
EOSINOPHIL NFR BLD: 1 % (ref 0–5)
EPITH CASTS URNS QL MICRO: ABNORMAL /LPF (ref 0–5)
ERYTHROCYTE [DISTWIDTH] IN BLOOD BY AUTOMATED COUNT: 13.4 % (ref 11.6–14.5)
GLOBULIN SER CALC-MCNC: 4.1 G/DL (ref 2–4)
GLUCOSE SERPL-MCNC: 120 MG/DL (ref 74–99)
GLUCOSE UR STRIP.AUTO-MCNC: NEGATIVE MG/DL
HCT VFR BLD AUTO: 52.2 % (ref 36–48)
HGB BLD-MCNC: 16.7 G/DL (ref 13–16)
HGB UR QL STRIP: ABNORMAL
INTERPRETATION: NORMAL
KETONES UR QL STRIP.AUTO: 15 MG/DL
LEUKOCYTE ESTERASE UR QL STRIP.AUTO: ABNORMAL
LIPASE SERPL-CCNC: 24 U/L (ref 73–393)
LYMPHOCYTES # BLD: 1.4 K/UL (ref 0.9–3.6)
LYMPHOCYTES NFR BLD: 14 % (ref 21–52)
MAGNESIUM SERPL-MCNC: 2.1 MG/DL (ref 1.6–2.6)
MCH RBC QN AUTO: 27.6 PG (ref 24–34)
MCHC RBC AUTO-ENTMCNC: 32 G/DL (ref 31–37)
MCV RBC AUTO: 86.3 FL (ref 74–97)
MONOCYTES # BLD: 1.2 K/UL (ref 0.05–1.2)
MONOCYTES NFR BLD: 13 % (ref 3–10)
MUCOUS THREADS URNS QL MICRO: ABNORMAL /LPF
NEUTS SEG # BLD: 6.8 K/UL (ref 1.8–8)
NEUTS SEG NFR BLD: 72 % (ref 40–73)
NITRITE UR QL STRIP.AUTO: NEGATIVE
PH UR STRIP: 5.5 [PH] (ref 5–8)
PLATELET # BLD AUTO: 196 K/UL (ref 135–420)
PMV BLD AUTO: 11.4 FL (ref 9.2–11.8)
POTASSIUM SERPL-SCNC: 3.8 MMOL/L (ref 3.5–5.5)
PROT SERPL-MCNC: 7.8 G/DL (ref 6.4–8.2)
PROT UR STRIP-MCNC: 30 MG/DL
RBC # BLD AUTO: 6.05 M/UL (ref 4.35–5.65)
RBC #/AREA URNS HPF: ABNORMAL /HPF (ref 0–5)
SODIUM SERPL-SCNC: 139 MMOL/L (ref 136–145)
SP GR UR REFRACTOMETRY: >1.03 (ref 1–1.03)
UROBILINOGEN UR QL STRIP.AUTO: 1 EU/DL (ref 0.2–1)
WBC # BLD AUTO: 9.5 K/UL (ref 4.6–13.2)
WBC URNS QL MICRO: ABNORMAL /HPF (ref 0–4)

## 2021-05-19 PROCEDURE — 85025 COMPLETE CBC W/AUTO DIFF WBC: CPT

## 2021-05-19 PROCEDURE — 81001 URINALYSIS AUTO W/SCOPE: CPT

## 2021-05-19 PROCEDURE — 87324 CLOSTRIDIUM AG IA: CPT

## 2021-05-19 PROCEDURE — 83690 ASSAY OF LIPASE: CPT

## 2021-05-19 PROCEDURE — 80053 COMPREHEN METABOLIC PANEL: CPT

## 2021-05-19 PROCEDURE — 87177 OVA AND PARASITES SMEARS: CPT

## 2021-05-19 PROCEDURE — 74177 CT ABD & PELVIS W/CONTRAST: CPT

## 2021-05-19 PROCEDURE — 96375 TX/PRO/DX INJ NEW DRUG ADDON: CPT

## 2021-05-19 PROCEDURE — 74011000636 HC RX REV CODE- 636: Performed by: EMERGENCY MEDICINE

## 2021-05-19 PROCEDURE — 99284 EMERGENCY DEPT VISIT MOD MDM: CPT

## 2021-05-19 PROCEDURE — 74011250636 HC RX REV CODE- 250/636: Performed by: PHYSICIAN ASSISTANT

## 2021-05-19 PROCEDURE — 83735 ASSAY OF MAGNESIUM: CPT

## 2021-05-19 PROCEDURE — 74011250637 HC RX REV CODE- 250/637: Performed by: EMERGENCY MEDICINE

## 2021-05-19 PROCEDURE — 96361 HYDRATE IV INFUSION ADD-ON: CPT

## 2021-05-19 PROCEDURE — 87506 IADNA-DNA/RNA PROBE TQ 6-11: CPT

## 2021-05-19 PROCEDURE — 96374 THER/PROPH/DIAG INJ IV PUSH: CPT

## 2021-05-19 RX ORDER — DICYCLOMINE HYDROCHLORIDE 10 MG/5ML
20 SOLUTION ORAL 4 TIMES DAILY
Qty: 200 ML | Refills: 0 | Status: SHIPPED | OUTPATIENT
Start: 2021-05-19 | End: 2022-03-09

## 2021-05-19 RX ORDER — ONDANSETRON 2 MG/ML
4 INJECTION INTRAMUSCULAR; INTRAVENOUS
Status: COMPLETED | OUTPATIENT
Start: 2021-05-19 | End: 2021-05-19

## 2021-05-19 RX ORDER — CIPROFLOXACIN 500 MG/1
500 TABLET ORAL 2 TIMES DAILY
Qty: 20 TABLET | Refills: 0 | Status: SHIPPED | OUTPATIENT
Start: 2021-05-19 | End: 2021-05-29

## 2021-05-19 RX ORDER — CIPROFLOXACIN 500 MG/1
500 TABLET ORAL
Status: COMPLETED | OUTPATIENT
Start: 2021-05-19 | End: 2021-05-19

## 2021-05-19 RX ORDER — ONDANSETRON 4 MG/1
TABLET, ORALLY DISINTEGRATING ORAL
Qty: 10 TABLET | Refills: 0 | Status: SHIPPED | OUTPATIENT
Start: 2021-05-19

## 2021-05-19 RX ORDER — MORPHINE SULFATE 4 MG/ML
4 INJECTION INTRAVENOUS
Status: COMPLETED | OUTPATIENT
Start: 2021-05-19 | End: 2021-05-19

## 2021-05-19 RX ADMIN — ONDANSETRON 4 MG: 2 INJECTION INTRAMUSCULAR; INTRAVENOUS at 19:30

## 2021-05-19 RX ADMIN — CIPROFLOXACIN 500 MG: 500 TABLET, FILM COATED ORAL at 23:10

## 2021-05-19 RX ADMIN — SODIUM CHLORIDE 1000 ML: 900 INJECTION, SOLUTION INTRAVENOUS at 19:30

## 2021-05-19 RX ADMIN — MORPHINE SULFATE 4 MG: 4 INJECTION, SOLUTION INTRAMUSCULAR; INTRAVENOUS at 19:31

## 2021-05-19 RX ADMIN — IOPAMIDOL 80 ML: 612 INJECTION, SOLUTION INTRAVENOUS at 20:44
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
## 2021-05-19 NOTE — ED TRIAGE NOTES
Patient states vomiting and diarrhea x 3 days. He states that he has not taken his BG in two days. C/o right side abdominal pain.

## 2021-05-19 NOTE — LETTER
[address] [place] [address] Dr [facility] EMERGENCY DEPT 
[address] [facility] [address] [phone] Work/School Note Date: 5/19/2021 To Whom It May concern: 
 
Grisel Evans was seen and treated today in the emergency room by the following provider(s): 
Attending Provider: Dayan Coronado MD.   
 
Grisel Evans may return to work on 05/24/2021. Sincerely, 
 
 
 
Dr. Dell Del Cid

## 2021-05-20 LAB
CAMPYLOBACTER SPECIES, DNA: NEGATIVE
ENTEROTOXIGEN E COLI, DNA: NEGATIVE
P SHIGELLOIDES DNA STL QL NAA+PROBE: NEGATIVE
SALMONELLA SPECIES, DNA: NEGATIVE
SHIGA TOXIN PRODUCING, DNA: NEGATIVE
SHIGELLA SP+EIEC IPAH STL QL NAA+PROBE: NEGATIVE
VIBRIO SPECIES, DNA: NEGATIVE
Y. ENTEROCOLITICA, DNA: NEGATIVE

## 2021-05-20 NOTE — ROUTINE PROCESS
Messi Lopez is a 48 y.o. male that was discharged in stable. Pt was accompanied by spouse. Pt is not driving. The patients diagnosis, condition and treatment were explained to  patient and aftercare instructions were given. The patient verbalized understanding. Patient armband removed and shredded.

## 2021-05-20 NOTE — DISCHARGE INSTRUCTIONS
Patient Education        Gastroenteritis: Care Instructions  Your Care Instructions     Gastroenteritis is an illness that may cause nausea, vomiting, and diarrhea. It is sometimes called \"stomach flu. \" It can be caused by bacteria or a virus. You will probably begin to feel better in 1 to 2 days. In the meantime, get plenty of rest and make sure you do not become dehydrated. Dehydration occurs when your body loses too much fluid. Follow-up care is a key part of your treatment and safety. Be sure to make and go to all appointments, and call your doctor if you are having problems. It's also a good idea to know your test results and keep a list of the medicines you take. How can you care for yourself at home? · If your doctor prescribed antibiotics, take them as directed. Do not stop taking them just because you feel better. You need to take the full course of antibiotics. · Drink plenty of fluids to prevent dehydration. Choose water and other caffeine-free clear liquids until you feel better. If you have kidney, heart, or liver disease and have to limit fluids, talk with your doctor before you increase your fluid intake. · Drink fluids slowly, in frequent, small amounts, because drinking too much too fast can cause vomiting. · Begin eating mild foods, such as dry toast, yogurt, applesauce, bananas, and rice. Avoid spicy, hot, or high-fat foods, and do not drink alcohol or caffeine for a day or two. Do not drink milk or eat ice cream until you are feeling better. How to prevent gastroenteritis  · Keep hot foods hot and cold foods cold. · Do not eat meats, dressings, salads, or other foods that have been kept at room temperature for more than 2 hours. · Use a thermometer to check your refrigerator. It should be between 34°F and 40°F.  · Defrost meats in the refrigerator or microwave, not on the kitchen counter. · Keep your hands and your kitchen clean.  Wash your hands, cutting boards, and countertops with hot soapy water frequently. · Cook meat until it is well done. · Do not eat raw eggs or uncooked sauces made with raw eggs. · Do not take chances. If food looks or tastes spoiled, throw it out. When should you call for help? Call 911 anytime you think you may need emergency care. For example, call if:    · You vomit blood or what looks like coffee grounds.     · You passed out (lost consciousness).     · You pass maroon or very bloody stools. Call your doctor now or seek immediate medical care if:    · You have severe belly pain.     · You have signs of needing more fluids. You have sunken eyes, a dry mouth, and pass only a little urine.     · You feel like you are going to faint.     · You have increased belly pain that does not go away in 1 to 2 days.     · You have new or increased nausea, or you are vomiting.     · You have a new or higher fever.     · Your stools are black and tarlike or have streaks of blood. Watch closely for changes in your health, and be sure to contact your doctor if:    · You are dizzy or lightheaded.     · You urinate less than usual, or your urine is dark yellow or brown.     · You do not feel better with each day that goes by. Where can you learn more? Go to http://www.After[employer].me.com/  Enter N142 in the search box to learn more about \"Gastroenteritis: Care Instructions. \"  Current as of: September 23, 2020               Content Version: 12.8  © 1660-8038 Healthwise, [facility]. Care instructions adapted under license by [employer] (which disclaims liability or warranty for this information). If you have questions about a medical condition or this instruction, always ask your healthcare professional. [place] [address] any warranty or liability for your use of this information.

## 2021-05-24 LAB
O+P SPEC MICRO: NORMAL
O+P STL CONC: NORMAL
SPECIMEN SOURCE: NORMAL

## 2021-07-07 ENCOUNTER — TELEPHONE (OUTPATIENT)
Dept: FAMILY MEDICINE CLINIC | Age: 46
End: 2021-07-07

## 2021-07-07 NOTE — TELEPHONE ENCOUNTER
I called and spoke with patient as he is due for routine follow up and A1c. He stated he was right in the middle of something and would have to call the office back.

## 2021-09-09 ENCOUNTER — TELEPHONE (OUTPATIENT)
Dept: FAMILY MEDICINE CLINIC | Age: 46
End: 2021-09-09

## 2021-09-09 NOTE — LETTER
9/9/2021 11:23 AM    [address]  [facility] [address]      Dear Mr. Enriqueta Jimenez:    Gaviota Manuel missed you! Please call our office at [phone] and schedule a follow up appointment for your continued care.         Sincerely,      Jesus Whitfield MD

## 2021-09-09 NOTE — TELEPHONE ENCOUNTER
I called and left a message on voicemail asking the patient to call the office to scheduled follow up appt and due for A1c. Patient was also contacted back in July. Letter and netTALKhart message sent also.

## 2021-10-14 ENCOUNTER — PATIENT MESSAGE (OUTPATIENT)
Dept: FAMILY MEDICINE CLINIC | Age: 46
End: 2021-10-14

## 2022-03-09 ENCOUNTER — OFFICE VISIT (OUTPATIENT)
Dept: FAMILY MEDICINE CLINIC | Age: 47
End: 2022-03-09
Payer: OTHER GOVERNMENT

## 2022-03-09 VITALS
HEART RATE: 69 BPM | BODY MASS INDEX: 41.45 KG/M2 | TEMPERATURE: 98.9 F | WEIGHT: 315 LBS | OXYGEN SATURATION: 97 % | RESPIRATION RATE: 14 BRPM | DIASTOLIC BLOOD PRESSURE: 78 MMHG | SYSTOLIC BLOOD PRESSURE: 134 MMHG

## 2022-03-09 DIAGNOSIS — E11.65 TYPE 2 DIABETES MELLITUS WITH HYPERGLYCEMIA, WITHOUT LONG-TERM CURRENT USE OF INSULIN (HCC): Primary | ICD-10-CM

## 2022-03-09 DIAGNOSIS — Z12.11 COLON CANCER SCREENING: ICD-10-CM

## 2022-03-09 DIAGNOSIS — K21.00 GASTROESOPHAGEAL REFLUX DISEASE WITH ESOPHAGITIS WITHOUT HEMORRHAGE: ICD-10-CM

## 2022-03-09 DIAGNOSIS — I10 ELEVATED BLOOD PRESSURE READING WITH DIAGNOSIS OF HYPERTENSION: ICD-10-CM

## 2022-03-09 DIAGNOSIS — J40 BRONCHITIS: ICD-10-CM

## 2022-03-09 DIAGNOSIS — F17.200 SMOKING: ICD-10-CM

## 2022-03-09 LAB — HBA1C MFR BLD HPLC: 6 %

## 2022-03-09 PROCEDURE — 99214 OFFICE O/P EST MOD 30 MIN: CPT | Performed by: FAMILY MEDICINE

## 2022-03-09 PROCEDURE — 83036 HEMOGLOBIN GLYCOSYLATED A1C: CPT | Performed by: FAMILY MEDICINE

## 2022-03-09 RX ORDER — METFORMIN HYDROCHLORIDE 500 MG/1
500 TABLET ORAL
Qty: 90 TABLET | Refills: 3 | Status: SHIPPED | OUTPATIENT
Start: 2022-03-09

## 2022-03-09 RX ORDER — BUPROPION HYDROCHLORIDE 150 MG/1
150 TABLET, EXTENDED RELEASE ORAL DAILY
Qty: 90 TABLET | Refills: 1 | Status: SHIPPED | OUTPATIENT
Start: 2022-03-09

## 2022-03-09 RX ORDER — PANTOPRAZOLE SODIUM 40 MG/1
40 TABLET, DELAYED RELEASE ORAL DAILY
Qty: 90 TABLET | Refills: 1 | Status: SHIPPED | OUTPATIENT
Start: 2022-03-09

## 2022-03-09 RX ORDER — INSULIN PUMP SYRINGE, 3 ML
EACH MISCELLANEOUS
Qty: 1 KIT | Refills: 0 | Status: SHIPPED | OUTPATIENT
Start: 2022-03-09

## 2022-03-09 RX ORDER — ALBUTEROL SULFATE 90 UG/1
2 AEROSOL, METERED RESPIRATORY (INHALATION)
Qty: 3 EACH | Refills: 3 | Status: SHIPPED | OUTPATIENT
Start: 2022-03-09

## 2022-03-09 RX ORDER — BLOOD-GLUCOSE METER
KIT MISCELLANEOUS
Qty: 300 STRIP | Refills: 4 | Status: SHIPPED | OUTPATIENT
Start: 2022-03-09

## 2022-03-09 NOTE — PATIENT INSTRUCTIONS
Colon Cancer Screening: Care Instructions  Overview     Colorectal cancer occurs in the colon or rectum. That's the lower part of your digestive system. It often starts in small growths called polyps in the colon or rectum. Polyps are usually found with screening tests. Depending on the type of test, any polyps found may be removed during the tests. Colorectal cancer usually does not cause symptoms at first. But regular tests can help find it early, before it spreads and becomes harder to treat. Your risk for colorectal cancer gets higher as you get older. Experts recommend starting screening at age 39 for people who are at average risk. Talk with your doctor about your risk and when to start and stop screening. You may have one of several tests. Follow-up care is a key part of your treatment and safety. Be sure to make and go to all appointments, and call your doctor if you are having problems. It's also a good idea to know your test results and keep a list of the medicines you take. What are the main screening tests for colon cancer? The screening tests are:  Stool tests. These include the guaiac fecal occult blood test (gFOBT), the fecal immunochemical test (FIT), and the combined fecal immunochemical test and stool DNA test (FIT-DNA). These tests check stool samples for signs of cancer. If your test is positive, you will need to have a colonoscopy. Sigmoidoscopy. This test lets your doctor look at the lining of your rectum and the lowest part of your colon. Your doctor uses a lighted tube called a sigmoidoscope. This test can't find cancers or polyps in the upper part of your colon. In some cases, polyps that are found can be removed. But if your doctor finds polyps, you will need to have a colonoscopy to check the upper part of your colon. Colonoscopy. This test lets your doctor look at the lining of your rectum and your entire colon. The doctor uses a thin, flexible tool called a colonoscope.  It can also be used to remove polyps or get a tissue sample (biopsy). A less common test is CT colonography (CTC). It's also called virtual colonoscopy. Who should be screened for colorectal cancer? Your risk for colorectal cancer gets higher as you get older. Experts recommend starting screening at age 39 for people who are at average risk. Talk with your doctor about your risk and when to start and stop screening. How often you need screening depends on the type of test you get:  Stool tests. Every year for FIT or gFOBT. Every 1 to 3 years for sDNA, also called FIT-DNA. Tests that look inside the colon. Every 5 years for sigmoidoscopy. (If you do the FIT test every year, you can get this test every 10 years.)  Every 5 years for CT colonography (virtual colonoscopy). Every 10 years for colonoscopy. Experts agree that people at higher risk may need to be tested sooner and more often. This includes people who have a strong family history of colon cancer. Talk to your doctor about which test is best for you and when to be tested. When should you call for help? Watch closely for changes in your health, and be sure to contact your doctor if:    · You have any changes in your bowel habits.     · You have any problems. Where can you learn more? Go to http://www.gray.com/  Enter M541 in the search box to learn more about \"Colon Cancer Screening: Care Instructions. \"  Current as of: September 8, 2021               Content Version: 13.2  © 9209-9035 [employer]. Care instructions adapted under license by [employer] (which disclaims liability or warranty for this information). If you have questions about a medical condition or this instruction, always ask your healthcare professional. [place] [address] any warranty or liability for your use of this information.

## 2022-03-09 NOTE — PROGRESS NOTES
Chari Vaca is a 48 y.o. male  presents for follow up of DM and HTN and gerd. No Known Allergies  Outpatient Medications Marked as Taking for the 3/9/22 encounter (Office Visit) with Kb Martin MD   Medication Sig Dispense Refill    Blood-Glucose Meter (FreeStyle Lite Meter) monitoring kit Use to check blood glucose levels as directed. 1 Kit 0    glucose blood VI test strips (FreeStyle Lite Strips) strip Use to check blood glucose levels as directed 300 Strip 4    pantoprazole (PROTONIX) 40 mg tablet Take 1 Tablet by mouth daily. 90 Tablet 1    buPROPion SR (WELLBUTRIN SR) 150 mg SR tablet Take 1 Tablet by mouth daily. 90 Tablet 1    metFORMIN (GLUCOPHAGE) 500 mg tablet Take 1 Tablet by mouth daily (with breakfast). 90 Tablet 3    albuterol (PROVENTIL HFA, VENTOLIN HFA, PROAIR HFA) 90 mcg/actuation inhaler Take 2 Puffs by inhalation every four (4) hours as needed for Wheezing. 3 Each 3    ondansetron (Zofran ODT) 4 mg disintegrating tablet Take 1-2 tablets every 6-8 hours as needed for nausea and vomiting. 10 Tablet 0    ibuprofen (MOTRIN) 600 mg tablet Take 1 Tab by mouth every six (6) hours as needed for Pain. 20 Tab 0    Blood-Glucose Meter monitoring kit Use bid as directed 1 Kit 0    Lancets misc Use bid. 1 Each 11    glucose blood VI test strips (BLOOD GLUCOSE TEST) strip Use bid. 100 Strip 2     Patient Active Problem List   Diagnosis Code    Heartburn R12    Chest pain, central R07.9    Neck pain M54.2    Seasonal allergic rhinitis J30.2    Smoking F17.200    Depression F32. A    Vitamin D deficiency E55.9    Obesity E66.9    Decreased libido R68.82    Recurrent depression ([facility] F33.9    Class 2 obesity due to excess calories without serious comorbidity with body mass index (BMI) of 38.0 to 38.9 in adult E66.09, Z68.38    Hypertension I10    Type 2 diabetes mellitus with hyperglycemia, without long-term current use of insulin (HCC) E11.65    Acute pain of right shoulder M25.511    Severe obesity (BMI 35.0-39. 9) with comorbidity ([facility] E66.01     Past Medical History:   Diagnosis Date    Depression     Diarrhea     Hypertension 1/11/2018    no meds    Type 2 diabetes mellitus with hyperglycemia, without long-term current use of insulin ([facility] 5/29/2018     Social History     Socioeconomic History    Marital status:    Tobacco Use    Smoking status: Former Smoker     Packs/day: 1.00     Years: 15.00     Pack years: 15.00     Types: Cigarettes    Smokeless tobacco: Former User   Substance and Sexual Activity    Alcohol use: Not Currently     Comment: socially    Drug use: Not Currently     Types: Marijuana     Comment: in the past     No family history on file. Review of Systems   Constitutional: Negative for chills, fever, malaise/fatigue and weight loss. Eyes: Negative for blurred vision. Respiratory: Negative for cough, shortness of breath and wheezing. Cardiovascular: Negative for chest pain. Gastrointestinal: Negative for nausea and vomiting. Musculoskeletal: Negative for myalgias. Skin: Negative for rash. Neurological: Negative for weakness. Psychiatric/Behavioral: Negative. Vitals:    03/09/22 1647   BP: 134/78   Pulse: 69   Resp: 14   Temp: 98.9 °F (37.2 °C)   TempSrc: Skin   SpO2: 97%   Weight: 322 lb 12.8 oz (146.4 kg)   PainSc:   0 - No pain       Physical Exam  Vitals and nursing note reviewed. Constitutional:       Appearance: Normal appearance. He is obese. Neck:      Thyroid: No thyromegaly. Cardiovascular:      Rate and Rhythm: Normal rate and regular rhythm. Heart sounds: Normal heart sounds. Pulmonary:      Effort: Pulmonary effort is normal.      Breath sounds: Normal breath sounds. Musculoskeletal:         General: Normal range of motion. Cervical back: Normal range of motion and neck supple. Skin:     General: Skin is warm and dry. Capillary Refill: Capillary refill takes less than 2 seconds. Neurological:      General: No focal deficit present. Mental Status: He is alert and oriented to person, place, and time. Psychiatric:         Mood and Affect: Mood normal.         Behavior: Behavior normal.         Thought Content: Thought content normal.         Judgment: Judgment normal.         Assessment/Plan      ICD-10-CM ICD-9-CM    1. Type 2 diabetes mellitus with hyperglycemia, without long-term current use of insulin (HCC)  E11.65 250.00 AMB POC HEMOGLOBIN A1C     790.29 metFORMIN (GLUCOPHAGE) 500 mg tablet   2. Elevated blood pressure reading with diagnosis of hypertension  I10 401.9 Blood-Glucose Meter (FreeStyle Lite Meter) monitoring kit      glucose blood VI test strips (FreeStyle Lite Strips) strip   3. Gastroesophageal reflux disease with esophagitis without hemorrhage  K21.00 530.81 pantoprazole (PROTONIX) 40 mg tablet     530.10    4. Smoking  F17.200 305.1 buPROPion SR (WELLBUTRIN SR) 150 mg SR tablet   5. Colon cancer screening  Z12.11 V76.51 OCCULT BLOOD IMMUNOASSAY,DIAGNOSTIC   6. Bronchitis  J40 490 albuterol (PROVENTIL HFA, VENTOLIN HFA, PROAIR HFA) 90 mcg/actuation inhaler     I have discussed the diagnosis with the patient and the intended plan of care as seen in the above orders. The patient has received an after-visit summary and questions were answered concerning future plans. I have discussed medication, side effects, and warnings with the patient in detail. The patient verbalized understanding and is in agreement with the plan of care. The patient will contact the office with any additional concerns.         lab results and schedule of future lab studies reviewed with patient    Ricky Lopez MD

## 2022-03-09 NOTE — PROGRESS NOTES
Patient here for his chronic condition f/u. He says his Metformin does cause GI issues and his Omeprazole does not help much. 1. \"Have you been to the ER, urgent care clinic since your last visit? Hospitalized since your last visit? \" No    2. \"Have you seen or consulted any other health care providers outside of the [address] [place] [address] since your last visit? \" No     3. For patients aged 39-70: Has the patient had a colonoscopy / FIT/ Cologuard? No      If the patient is female:    4. For patients aged 41-77: Has the patient had a mammogram within the past 2 years? NA - based on age or sex      11. For patients aged 21-65: Has the patient had a pap smear?  NA - based on age or sex

## 2022-03-11 ENCOUNTER — TELEPHONE (OUTPATIENT)
Dept: FAMILY MEDICINE CLINIC | Age: 47
End: 2022-03-11

## 2022-03-11 DIAGNOSIS — E11.65 TYPE 2 DIABETES MELLITUS WITH HYPERGLYCEMIA, WITHOUT LONG-TERM CURRENT USE OF INSULIN (HCC): ICD-10-CM

## 2022-03-11 RX ORDER — IBUPROFEN 200 MG
CAPSULE ORAL
Qty: 100 STRIP | Refills: 2 | Status: SHIPPED | OUTPATIENT
Start: 2022-03-11

## 2022-03-11 NOTE — TELEPHONE ENCOUNTER
----- Message from Chazravindra Keenan sent at 3/9/2022  5:11 PM EST -----  Subject: Medication Problem    QUESTIONS  Name of Medication? glucose blood VI test strips (FreeStyle Lite Strips)   strip  Patient-reported dosage and instructions? Use to check blood glucose   levels as directed  What question or problem do you have with the medication? augie from [facility] is calling in stating that if can they need the freq on how many times   they need to take medication for the pharmacy if can please call back   pharmacy or send new script   Preferred [facility]acy? [facility] [address] [facility], [address]   [address] phone number (if available)? [phone]  Additional Information for Provider?   ---------------------------------------------------------------------------  --------------  [address]  What is the best way for the office to contact you? OK to leave message on   voicemail  Preferred Call Back Phone Number? [phone]  ---------------------------------------------------------------------------  --------------  SCRIPT ANSWERS  Relationship to Patient?  Third Party  Representative Name? Lewis Saliva kellie aid

## 2022-03-18 PROBLEM — I10 HYPERTENSION: Status: ACTIVE | Noted: 2018-01-11

## 2022-03-19 PROBLEM — F33.9 RECURRENT DEPRESSION (HCC): Status: ACTIVE | Noted: 2017-12-26

## 2022-03-19 PROBLEM — E11.65 TYPE 2 DIABETES MELLITUS WITH HYPERGLYCEMIA, WITHOUT LONG-TERM CURRENT USE OF INSULIN (HCC): Status: ACTIVE | Noted: 2018-05-29

## 2022-03-19 PROBLEM — M25.511 ACUTE PAIN OF RIGHT SHOULDER: Status: ACTIVE | Noted: 2018-05-29

## 2022-03-19 PROBLEM — E66.01 SEVERE OBESITY (BMI 35.0-39.9) WITH COMORBIDITY (HCC): Status: ACTIVE | Noted: 2018-05-29

## 2022-03-19 PROBLEM — E66.09 CLASS 2 OBESITY DUE TO EXCESS CALORIES WITHOUT SERIOUS COMORBIDITY WITH BODY MASS INDEX (BMI) OF 38.0 TO 38.9 IN ADULT: Status: ACTIVE | Noted: 2018-01-08

## 2023-02-08 ENCOUNTER — TELEPHONE (OUTPATIENT)
Dept: FAMILY MEDICINE CLINIC | Age: 48
End: 2023-02-08

## 2023-02-08 DIAGNOSIS — J40 BRONCHITIS: ICD-10-CM

## 2023-02-08 NOTE — TELEPHONE ENCOUNTER
Patient called requesting a refill on his albuterol (PROVENTIL HFA, VENTOLIN HFA, PROAIR HFA) 90 mcg/actuation inhaler until his next scheduled appointment on 02/22/23 which is [facility]. Please review and advise.

## 2023-02-10 RX ORDER — ALBUTEROL SULFATE 90 UG/1
2 AEROSOL, METERED RESPIRATORY (INHALATION)
Qty: 1 EACH | Refills: 0 | Status: SHIPPED | OUTPATIENT
Start: 2023-02-10

## 2023-02-23 ENCOUNTER — OFFICE VISIT (OUTPATIENT)
Facility: CLINIC | Age: 48
End: 2023-02-23
Payer: OTHER GOVERNMENT

## 2023-02-23 VITALS
SYSTOLIC BLOOD PRESSURE: 124 MMHG | DIASTOLIC BLOOD PRESSURE: 84 MMHG | OXYGEN SATURATION: 87 % | TEMPERATURE: 97.4 F | HEART RATE: 71 BPM | WEIGHT: 315 LBS | BODY MASS INDEX: 41.6 KG/M2 | RESPIRATION RATE: 14 BRPM

## 2023-02-23 DIAGNOSIS — K21.00 GASTRO-ESOPHAGEAL REFLUX DISEASE WITH ESOPHAGITIS, WITHOUT BLEEDING: ICD-10-CM

## 2023-02-23 DIAGNOSIS — J40 BRONCHITIS, NOT SPECIFIED AS ACUTE OR CHRONIC: ICD-10-CM

## 2023-02-23 DIAGNOSIS — E11.65 TYPE 2 DIABETES MELLITUS WITH HYPERGLYCEMIA, WITHOUT LONG-TERM CURRENT USE OF INSULIN (HCC): Primary | ICD-10-CM

## 2023-02-23 DIAGNOSIS — Z12.11 COLON CANCER SCREENING: ICD-10-CM

## 2023-02-23 LAB — HBA1C MFR BLD: 6.1 %

## 2023-02-23 PROCEDURE — 3074F SYST BP LT 130 MM HG: CPT | Performed by: FAMILY MEDICINE

## 2023-02-23 PROCEDURE — 83036 HEMOGLOBIN GLYCOSYLATED A1C: CPT | Performed by: FAMILY MEDICINE

## 2023-02-23 PROCEDURE — 3079F DIAST BP 80-89 MM HG: CPT | Performed by: FAMILY MEDICINE

## 2023-02-23 PROCEDURE — 99213 OFFICE O/P EST LOW 20 MIN: CPT | Performed by: FAMILY MEDICINE

## 2023-02-23 RX ORDER — ALBUTEROL SULFATE 90 UG/1
2 AEROSOL, METERED RESPIRATORY (INHALATION) EVERY 4 HOURS PRN
Qty: 18 G | Refills: 5 | Status: SHIPPED | OUTPATIENT
Start: 2023-02-23

## 2023-02-23 RX ORDER — SEMAGLUTIDE 1.34 MG/ML
0.5 INJECTION, SOLUTION SUBCUTANEOUS
Qty: 1 ADJUSTABLE DOSE PRE-FILLED PEN SYRINGE | Refills: 3 | Status: SHIPPED | OUTPATIENT
Start: 2023-02-23

## 2023-02-23 RX ORDER — PANTOPRAZOLE SODIUM 40 MG/1
40 TABLET, DELAYED RELEASE ORAL DAILY
Qty: 90 TABLET | Refills: 3 | Status: SHIPPED | OUTPATIENT
Start: 2023-02-23

## 2023-02-23 SDOH — ECONOMIC STABILITY: INCOME INSECURITY: HOW HARD IS IT FOR YOU TO PAY FOR THE VERY BASICS LIKE FOOD, HOUSING, MEDICAL CARE, AND HEATING?: PATIENT DECLINED

## 2023-02-23 ASSESSMENT — ENCOUNTER SYMPTOMS
VOMITING: 0
WHEEZING: 1
COUGH: 0
NAUSEA: 0

## 2023-02-23 ASSESSMENT — PATIENT HEALTH QUESTIONNAIRE - PHQ9: DEPRESSION UNABLE TO ASSESS: PT REFUSES

## 2023-02-23 NOTE — LETTER
[facility]  [address]  [address] [facility] [address]  Phone: [phone]  Fax: [phone]           Alberto Gudino MD      February 23, 2023     Patient: Pawel Sarmiento   MR Number: [id number]   YOB: 1975   Date of Visit: 2/23/2023       Dear Dr. Colt Mak ref. provider found: Thank you for referring Pawel Sarmiento to me for evaluation/treatment. Below are the relevant portions of my assessment and plan of care. He has COPD and can not do extensive walking or ladders climbing     If you have questions, please do not hesitate to call me. I look forward to following Dima Wynn along with you.     Sincerely,        Alberto Gudino MD     providers:  Pawel Sarmiento  [place] [address] [place] [address] [place] [id number]  [employer]ia [employer] Locally

## 2023-02-23 NOTE — PROGRESS NOTES
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 He also would like to discuss injection for weight loss. He also has concerns with a hemorrhoid. 1. \"Have you been to the ER, urgent care clinic since your last visit? Hospitalized since your last visit? \" No    2. \"Have you seen or consulted any other health care providers outside of the [address] [place] [address] since your last visit? \" No     3. For patients aged 39-70: Has the patient had a colonoscopy / FIT/ Cologuard? No      If the patient is female:    4. For patients aged 41-77: Has the patient had a mammogram within the past 2 years? NA - based on age or sex      11. For patients aged 21-65: Has the patient had a pap smear?  NA - based on age or sex

## 2023-02-23 NOTE — Clinical Note
[facility]  [address]  [address] [facility] [address]  Phone: [phone]  Fax: [phone]    Peña Butterfield MD        February 23, 2023     Patient: Williams Bermudez   YOB: 1975   Date of Visit: 2/23/2023       To Whom It May Concern: It is my medical opinion that Williams Bermudez {Work release (duty restriction):74820}. If you have any questions or concerns, please don't hesitate to call.     Sincerely,        Peña Butterfield MD

## 2023-02-23 NOTE — PROGRESS NOTES
Alka Doshi is a 50 y.o. male  presents for DM gerd and chronic bronchitis    Chief Complaint   Patient presents with    COPD    Letter for School/Work        No Known Allergies    Current Outpatient Medications:     albuterol sulfate HFA (PROVENTIL;VENTOLIN;PROAIR) 108 (90 Base) MCG/ACT inhaler, Inhale 2 puffs into the lungs every 4 hours as needed for Wheezing, Disp: 18 g, Rfl: 5    metFORMIN (GLUCOPHAGE) 500 MG tablet, Take 1 tablet by mouth daily (with breakfast), Disp: 180 tablet, Rfl: 3    pantoprazole (PROTONIX) 40 MG tablet, Take 1 tablet by mouth daily, Disp: 90 tablet, Rfl: 3    Semaglutide,0.25 or 0.5MG/DOS, (OZEMPIC, 0.25 OR 0.5 MG/DOSE,) 2 MG/1.5ML SOPN, Inject 0.5 mg into the skin every 7 days, Disp: 1 Adjustable Dose Pre-filled Pen Syringe, Rfl: 3    Lancets MISC, Use bid., Disp: , Rfl:     ibuprofen (ADVIL;MOTRIN) 600 MG tablet, Take 600 mg by mouth every 6 hours as needed, Disp: , Rfl:     ondansetron (ZOFRAN-ODT) 4 MG disintegrating tablet, Take 1-2 tablets every 6-8 hours as needed for nausea and vomiting., Disp: , Rfl:     buPROPion (WELLBUTRIN SR) 150 MG extended release tablet, Take 150 mg by mouth daily (Patient not taking: Reported on 2/23/2023), Disp: , Rfl:    Patient Active Problem List   Diagnosis    Decreased libido    Hypertension    Vitamin D deficiency    Class 2 obesity due to excess calories without serious comorbidity with body mass index (BMI) of 38.0 to 38.9 in adult    Acute pain of right shoulder    Seasonal allergic rhinitis    Severe obesity (BMI 35.0-39. 9) with comorbidity ([address]    Chest pain, central    Neck pain    Recurrent depression ([address]    Type 2 diabetes mellitus with hyperglycemia, without long-term current use of insulin ([address]    Depression    Heartburn    Smoking    Obesity     Past Medical History:   Diagnosis Date    Depression     Diarrhea     Hypertension 1/11/2018    no meds    Type 2 diabetes mellitus with hyperglycemia, without long-term current use of insulin ([facility] 5/29/2018     Social History     Socioeconomic History    Marital status:      Spouse name: None    Number of children: None    Years of education: None    Highest education level: None   Tobacco Use    Smoking status: Former     Packs/day: 1.00     Types: Cigarettes    Smokeless tobacco: Former   Substance and Sexual Activity    Alcohol use: Not Currently    Drug use: Not Currently     Types: Marijuana Dietra Due)     Social Determinants of Health     Financial Resource Strain: Unknown    Difficulty of Paying Living Expenses: Patient refused     History reviewed. No pertinent family history. Review of Systems   Constitutional: Negative. Negative for fever. Respiratory:  Positive for wheezing. Negative for cough. Cardiovascular:  Negative for chest pain. Gastrointestinal:  Negative for nausea and vomiting. Neurological: Negative. Negative for weakness. Psychiatric/Behavioral: Negative. Negative for behavioral problems and suicidal ideas. Vitals:    02/23/23 0817   BP: 124/84   Pulse: 71   Resp: 14   Temp: 97.4 °F (36.3 °C)   SpO2: (!) 87%        Physical Exam  Constitutional:       Appearance: Normal appearance. He is obese. Cardiovascular:      Rate and Rhythm: Normal rate and regular rhythm. Pulses: Normal pulses. Heart sounds: Normal heart sounds. No murmur heard. Pulmonary:      Effort: Pulmonary effort is normal.      Breath sounds: Normal breath sounds. Skin:     Capillary Refill: Capillary refill takes less than 2 seconds. Neurological:      General: No focal deficit present. Mental Status: He is alert and oriented to person, place, and time. Psychiatric:         Mood and Affect: Mood normal.         Behavior: Behavior normal.         Thought Content: Thought content normal.         Judgment: Judgment normal.        Assessment/Plan     Diagnosis Orders   1.  Type 2 diabetes mellitus with hyperglycemia, without long-term current use of insulin ([facility]  metFORMIN (GLUCOPHAGE) 500 MG tablet    AMB POC HEMOGLOBIN A1C    Semaglutide,0.25 or 0.5MG/DOS, (OZEMPIC, 0.25 OR 0.5 MG/DOSE,) 2 MG/1.5ML SOPN      2. Colon cancer screening  Occult Blood Stool Immunoassay      3. Gastro-esophageal reflux disease with esophagitis, without bleeding  pantoprazole (PROTONIX) 40 MG tablet      4. Bronchitis, not specified as acute or chronic  albuterol sulfate HFA (PROVENTIL;VENTOLIN;PROAIR) 108 (90 Base) MCG/ACT inhaler        Follow-up and Dispositions    Return in about 3 months (around 5/23/2023). I have discussed the diagnosis with the patient and the intended plan of care as seen in the above orders. The patient has received an after-visit summary and questions were answered concerning future plans. I have discussed medication, side effects, and warnings with the patient in detail. The patient verbalized understanding and is in agreement with the plan of care. The patient will contact the office with any additional concerns.   lab results and schedule of future lab studies reviewed with patient    Ryan Beebe MD

## 2023-02-27 ENCOUNTER — TELEPHONE (OUTPATIENT)
Facility: CLINIC | Age: 48
End: 2023-02-27

## 2023-02-27 NOTE — TELEPHONE ENCOUNTER
Fax received from [address] [place] [address] meds stating prior auth is required for the [address] (0.25 OR 0.5 mg/dose Submit a PA request  1. Go to key. [employer] and click \"Enter a Key\"  2. Patient last name David Ip      : 1975      Key:[id number]  3.  Click \"start a PA\", complete the form, and \"send to plan\"

## 2023-04-25 ENCOUNTER — OFFICE VISIT (OUTPATIENT)
Facility: CLINIC | Age: 48
End: 2023-04-25
Payer: OTHER GOVERNMENT

## 2023-04-25 ENCOUNTER — HOSPITAL ENCOUNTER (OUTPATIENT)
Facility: HOSPITAL | Age: 48
Setting detail: SPECIMEN
Discharge: HOME | End: 2023-04-28
Payer: OTHER GOVERNMENT

## 2023-04-25 VITALS
HEIGHT: 74 IN | RESPIRATION RATE: 10 BRPM | OXYGEN SATURATION: 99 % | DIASTOLIC BLOOD PRESSURE: 89 MMHG | HEART RATE: 90 BPM | TEMPERATURE: 98 F | BODY MASS INDEX: 40.43 KG/M2 | SYSTOLIC BLOOD PRESSURE: 121 MMHG | WEIGHT: 315 LBS

## 2023-04-25 DIAGNOSIS — J40 BRONCHITIS, NOT SPECIFIED AS ACUTE OR CHRONIC: ICD-10-CM

## 2023-04-25 DIAGNOSIS — M25.562 ACUTE PAIN OF LEFT KNEE: Primary | ICD-10-CM

## 2023-04-25 DIAGNOSIS — Z12.11 COLON CANCER SCREENING: ICD-10-CM

## 2023-04-25 PROCEDURE — 82274 ASSAY TEST FOR BLOOD FECAL: CPT

## 2023-04-25 PROCEDURE — 3079F DIAST BP 80-89 MM HG: CPT | Performed by: FAMILY MEDICINE

## 2023-04-25 PROCEDURE — 99213 OFFICE O/P EST LOW 20 MIN: CPT | Performed by: FAMILY MEDICINE

## 2023-04-25 PROCEDURE — 3074F SYST BP LT 130 MM HG: CPT | Performed by: FAMILY MEDICINE

## 2023-04-25 RX ORDER — DICLOFENAC SODIUM 75 MG/1
75 TABLET, DELAYED RELEASE ORAL 2 TIMES DAILY
Qty: 60 TABLET | Refills: 3 | Status: SHIPPED | OUTPATIENT
Start: 2023-04-25

## 2023-04-25 SDOH — ECONOMIC STABILITY: INCOME INSECURITY: HOW HARD IS IT FOR YOU TO PAY FOR THE VERY BASICS LIKE FOOD, HOUSING, MEDICAL CARE, AND HEATING?: NOT HARD AT ALL

## 2023-04-25 SDOH — ECONOMIC STABILITY: HOUSING INSECURITY
IN THE LAST 12 MONTHS, WAS THERE A TIME WHEN YOU DID NOT HAVE A STEADY PLACE TO SLEEP OR SLEPT IN A SHELTER (INCLUDING NOW)?: NO

## 2023-04-25 SDOH — ECONOMIC STABILITY: FOOD INSECURITY: WITHIN THE PAST 12 MONTHS, YOU WORRIED THAT YOUR FOOD WOULD RUN OUT BEFORE YOU GOT MONEY TO BUY MORE.: NEVER TRUE

## 2023-04-25 SDOH — ECONOMIC STABILITY: FOOD INSECURITY: WITHIN THE PAST 12 MONTHS, THE FOOD YOU BOUGHT JUST DIDN'T LAST AND YOU DIDN'T HAVE MONEY TO GET MORE.: NEVER TRUE

## 2023-04-25 ASSESSMENT — PATIENT HEALTH QUESTIONNAIRE - PHQ9
5. POOR APPETITE OR OVEREATING: 0
9. THOUGHTS THAT YOU WOULD BE BETTER OFF DEAD, OR OF HURTING YOURSELF: 0
2. FEELING DOWN, DEPRESSED OR HOPELESS: 0
SUM OF ALL RESPONSES TO PHQ QUESTIONS 1-9: 0
SUM OF ALL RESPONSES TO PHQ QUESTIONS 1-9: 0
SUM OF ALL RESPONSES TO PHQ9 QUESTIONS 1 & 2: 0
10. IF YOU CHECKED OFF ANY PROBLEMS, HOW DIFFICULT HAVE THESE PROBLEMS MADE IT FOR YOU TO DO YOUR WORK, TAKE CARE OF THINGS AT HOME, OR GET ALONG WITH OTHER PEOPLE: 0
SUM OF ALL RESPONSES TO PHQ QUESTIONS 1-9: 0
3. TROUBLE FALLING OR STAYING ASLEEP: 0
SUM OF ALL RESPONSES TO PHQ QUESTIONS 1-9: 0
1. LITTLE INTEREST OR PLEASURE IN DOING THINGS: 0
4. FEELING TIRED OR HAVING LITTLE ENERGY: 0
8. MOVING OR SPEAKING SO SLOWLY THAT OTHER PEOPLE COULD HAVE NOTICED. OR THE OPPOSITE, BEING SO FIGETY OR RESTLESS THAT YOU HAVE BEEN MOVING AROUND A LOT MORE THAN USUAL: 0
6. FEELING BAD ABOUT YOURSELF - OR THAT YOU ARE A FAILURE OR HAVE LET YOURSELF OR YOUR FAMILY DOWN: 0
7. TROUBLE CONCENTRATING ON THINGS, SUCH AS READING THE NEWSPAPER OR WATCHING TELEVISION: 0

## 2023-04-25 ASSESSMENT — ENCOUNTER SYMPTOMS
COUGH: 0
NAUSEA: 0
VOMITING: 0
RESPIRATORY NEGATIVE: 1

## 2023-04-25 NOTE — PROGRESS NOTES
Meli Rivera is a 46 y.o. male  presents for left knee pain. He has history of surgery of same knee. Chief Complaint   Patient presents with    Knee Pain     Left        No Known Allergies    Current Outpatient Medications:     albuterol sulfate HFA (PROVENTIL;VENTOLIN;PROAIR) 108 (90 Base) MCG/ACT inhaler, Inhale 2 puffs into the lungs every 4 hours as needed for Wheezing, Disp: 18 g, Rfl: 5    metFORMIN (GLUCOPHAGE) 500 MG tablet, Take 1 tablet by mouth daily (with breakfast), Disp: 180 tablet, Rfl: 3    pantoprazole (PROTONIX) 40 MG tablet, Take 1 tablet by mouth daily, Disp: 90 tablet, Rfl: 3    Semaglutide,0.25 or 0.5MG/DOS, (OZEMPIC, 0.25 OR 0.5 MG/DOSE,) 2 MG/1.5ML SOPN, Inject 0.5 mg into the skin every 7 days, Disp: 1 Adjustable Dose Pre-filled Pen Syringe, Rfl: 3    Lancets MISC, Use bid., Disp: , Rfl:     ibuprofen (ADVIL;MOTRIN) 600 MG tablet, Take 600 mg by mouth every 6 hours as needed, Disp: , Rfl:     ondansetron (ZOFRAN-ODT) 4 MG disintegrating tablet, Take 1-2 tablets every 6-8 hours as needed for nausea and vomiting., Disp: , Rfl:     buPROPion (WELLBUTRIN SR) 150 MG extended release tablet, Take 150 mg by mouth daily (Patient not taking: Reported on 4/25/2023), Disp: , Rfl:    Patient Active Problem List   Diagnosis    Decreased libido    Hypertension    Vitamin D deficiency    Class 2 obesity due to excess calories without serious comorbidity with body mass index (BMI) of 38.0 to 38.9 in adult    Acute pain of right shoulder    Seasonal allergic rhinitis    Severe obesity (BMI 35.0-39. 9) with comorbidity ([address]    Chest pain, central    Neck pain    Recurrent depression ([address]    Type 2 diabetes mellitus with hyperglycemia, without long-term current use of insulin ([address]    Depression    Heartburn    Smoking    Obesity     Past Medical History:   Diagnosis Date    Depression     Diarrhea     Hypertension 1/11/2018    no meds    Type 2 diabetes mellitus with hyperglycemia, without long-term

## 2023-04-25 NOTE — PROGRESS NOTES
Chief Complaint   Patient presents with    Knee Pain     Left     Pt c/o left knee pain and swelling. Was seen at Urgent Care and told to see his PCP. Pain 8/10.

## 2023-04-27 LAB — HEMOCCULT STL QL IA: POSITIVE

## 2023-04-28 SDOH — HEALTH STABILITY: PHYSICAL HEALTH: ON AVERAGE, HOW MANY DAYS PER WEEK DO YOU ENGAGE IN MODERATE TO STRENUOUS EXERCISE (LIKE A BRISK WALK)?: 5 DAYS

## 2023-04-28 SDOH — HEALTH STABILITY: PHYSICAL HEALTH: ON AVERAGE, HOW MANY MINUTES DO YOU ENGAGE IN EXERCISE AT THIS LEVEL?: 10 MIN

## 2023-04-28 ASSESSMENT — SOCIAL DETERMINANTS OF HEALTH (SDOH)

## 2023-05-01 ENCOUNTER — OFFICE VISIT (OUTPATIENT)
Age: 48
End: 2023-05-01
Payer: OTHER GOVERNMENT

## 2023-05-01 VITALS — HEIGHT: 74 IN | WEIGHT: 315 LBS | TEMPERATURE: 97.8 F | BODY MASS INDEX: 40.43 KG/M2

## 2023-05-01 DIAGNOSIS — M25.562 ACUTE PAIN OF LEFT KNEE: Primary | ICD-10-CM

## 2023-05-01 DIAGNOSIS — M17.12 PRIMARY OSTEOARTHRITIS OF LEFT KNEE: ICD-10-CM

## 2023-05-01 DIAGNOSIS — M21.162 ACQUIRED VARUS DEFORMITY KNEE, LEFT: ICD-10-CM

## 2023-05-01 PROCEDURE — 99203 OFFICE O/P NEW LOW 30 MIN: CPT | Performed by: SPECIALIST

## 2023-05-01 PROCEDURE — 20610 DRAIN/INJ JOINT/BURSA W/O US: CPT | Performed by: SPECIALIST

## 2023-05-01 PROCEDURE — 73562 X-RAY EXAM OF KNEE 3: CPT | Performed by: SPECIALIST

## 2023-05-01 RX ORDER — OMEPRAZOLE 20 MG/1
20 CAPSULE, DELAYED RELEASE ORAL
COMMUNITY

## 2023-05-01 RX ORDER — BETAMETHASONE SODIUM PHOSPHATE AND BETAMETHASONE ACETATE 3; 3 MG/ML; MG/ML
3 INJECTION, SUSPENSION INTRA-ARTICULAR; INTRALESIONAL; INTRAMUSCULAR; SOFT TISSUE ONCE
Status: COMPLETED | OUTPATIENT
Start: 2023-05-01 | End: 2023-05-01

## 2023-05-01 RX ORDER — GABAPENTIN 300 MG/1
300 CAPSULE ORAL 3 TIMES DAILY
COMMUNITY

## 2023-05-01 RX ADMIN — BETAMETHASONE SODIUM PHOSPHATE AND BETAMETHASONE ACETATE 3 MG: 3; 3 INJECTION, SUSPENSION INTRA-ARTICULAR; INTRALESIONAL; INTRAMUSCULAR; SOFT TISSUE at 15:36

## 2023-05-01 NOTE — PROGRESS NOTES
Patient: Falguni Rodríguez                MRN: [id number]       SSN: [id number]  YOB: 1975        AGE: 46 y.o. SEX: male      PCP: Dax Griffith MD  23    Chief Complaint   Patient presents with    Knee Pain     Left knee pain  Patient stated he slipped , felt pain medial side of left knee. HX left knee sx        HISTORY:  Falguni Rodríguez is a 46 y.o. male who is seen today presents as a new patient for left knee pain. He was referred for orthopaedic evaluation by Dr. Garima Gallardo (PCP). He reports swelling and pain of the medial side of the left knee after slipping on 23. 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 He was a former member of the [employer] for 4 years. He lives in [place] with his wife a [address] named Ti[address] and a [address] [facility]. Wt Readings from Last 3 Encounters:   23 (!) 327 lb (148.3 kg)   23 (!) 327 lb (148.3 kg)   23 (!) 324 lb (147 kg)      Body mass index is 41.98 kg/m². Patient Active Problem List   Diagnosis    Decreased libido    Hypertension    Vitamin D deficiency    Class 2 obesity due to excess calories without serious comorbidity with body mass index (BMI) of 38.0 to 38.9 in adult    Acute pain of right shoulder    Seasonal allergic rhinitis    Severe obesity (BMI 35.0-39. 9) with comorbidity ([address]    Chest pain, central    Neck pain    Recurrent depression ([address]    Type 2 diabetes mellitus with hyperglycemia, without long-term current use of insulin (HCC)    Depression    Heartburn    Smoking    Obesity     Social History     Tobacco Use    Smoking status: Former     Packs/day: 1.00     Years: 23.00     Pack years: 23.00     Types: Cigarettes     Quit date: 2022     Years since quittin.3    Smokeless tobacco: Former   Substance Use Topics    Alcohol use: Not Currently    Drug use: Yes     Frequency: 1.0 times per week     Types:

## 2023-05-02 DIAGNOSIS — R19.5 POSITIVE FIT (FECAL IMMUNOCHEMICAL TEST): Primary | ICD-10-CM

## 2023-05-23 ENCOUNTER — OFFICE VISIT (OUTPATIENT)
Facility: CLINIC | Age: 48
End: 2023-05-23
Payer: OTHER GOVERNMENT

## 2023-05-23 VITALS
TEMPERATURE: 97.9 F | WEIGHT: 315 LBS | SYSTOLIC BLOOD PRESSURE: 130 MMHG | RESPIRATION RATE: 16 BRPM | DIASTOLIC BLOOD PRESSURE: 80 MMHG | BODY MASS INDEX: 42.63 KG/M2

## 2023-05-23 DIAGNOSIS — E11.65 TYPE 2 DIABETES MELLITUS WITH HYPERGLYCEMIA, WITHOUT LONG-TERM CURRENT USE OF INSULIN (HCC): Primary | ICD-10-CM

## 2023-05-23 DIAGNOSIS — J40 BRONCHITIS, NOT SPECIFIED AS ACUTE OR CHRONIC: ICD-10-CM

## 2023-05-23 PROCEDURE — 3075F SYST BP GE 130 - 139MM HG: CPT | Performed by: FAMILY MEDICINE

## 2023-05-23 PROCEDURE — 3079F DIAST BP 80-89 MM HG: CPT | Performed by: FAMILY MEDICINE

## 2023-05-23 PROCEDURE — 99213 OFFICE O/P EST LOW 20 MIN: CPT | Performed by: FAMILY MEDICINE

## 2023-05-23 RX ORDER — ALBUTEROL SULFATE 90 UG/1
2 AEROSOL, METERED RESPIRATORY (INHALATION) EVERY 4 HOURS PRN
Qty: 18 G | Refills: 5 | Status: SHIPPED | OUTPATIENT
Start: 2023-05-23

## 2023-05-23 ASSESSMENT — ENCOUNTER SYMPTOMS
RESPIRATORY NEGATIVE: 1
SHORTNESS OF BREATH: 0
VOMITING: 0
CHEST TIGHTNESS: 0
APNEA: 0
WHEEZING: 0
COUGH: 0
NAUSEA: 0

## 2023-05-23 NOTE — PROGRESS NOTES
Chief Complaint   Patient presents with    3 Month Follow-Up     1. \"Have you been to the ER, urgent care clinic since your last visit? Hospitalized since your last visit? \" No    2. \"Have you seen or consulted any other health care providers outside of the [address] [place] [address] since your last visit? \" No     3. For patients aged 39-70: Has the patient had a colonoscopy / FIT/ Cologuard? Yes - no Care Gap present      If the patient is female:    4. For patients aged 41-77: Has the patient had a mammogram within the past 2 years? NA - based on age or sex      11. For patients aged 21-65: Has the patient had a pap smear?  NA - based on age or sex
weakness. Psychiatric/Behavioral: Negative. Negative for behavioral problems and suicidal ideas. Vitals:    05/23/23 0902   BP: 130/80   Resp: 16   Temp: 97.9 °F (36.6 °C)        Physical Exam  Constitutional:       Appearance: Normal appearance. He is obese. Cardiovascular:      Rate and Rhythm: Normal rate and regular rhythm. Pulses: Normal pulses. Heart sounds: Normal heart sounds. No murmur heard. Pulmonary:      Effort: Pulmonary effort is normal.      Breath sounds: Normal breath sounds. Skin:     Capillary Refill: Capillary refill takes less than 2 seconds. Neurological:      General: No focal deficit present. Mental Status: He is alert and oriented to person, place, and time. Psychiatric:         Mood and Affect: Mood normal.         Behavior: Behavior normal.         Thought Content: Thought content normal.         Judgment: Judgment normal.        Assessment/Plan     Diagnosis Orders   1. Type 2 diabetes mellitus with hyperglycemia, without long-term current use of insulin ([facility])  metFORMIN (GLUCOPHAGE) 500 MG tablet    Semaglutide, 1 MG/DOSE, 2 MG/1.5ML SOPN      2. Bronchitis, not specified as acute or chronic  albuterol sulfate HFA (PROVENTIL;VENTOLIN;PROAIR) 108 (90 Base) MCG/ACT inhaler        I have discussed the diagnosis with the patient and the intended plan of care as seen in the above orders. The patient has received an after-visit summary and questions were answered concerning future plans. I have discussed medication, side effects, and warnings with the patient in detail. The patient verbalized understanding and is in agreement with the plan of care. The patient will contact the office with any additional concerns.     lab results and schedule of future lab studies reviewed with patient    Corina Lemons MD

## 2023-06-05 ENCOUNTER — TELEPHONE (OUTPATIENT)
Facility: CLINIC | Age: 48
End: 2023-06-05

## 2023-06-05 NOTE — TELEPHONE ENCOUNTER
Pharmacy faxed request for a prescription update. Pharmacy states [address] only comes in 3 ml pens not 1.5. Please review and advise as soon as possible.

## 2023-06-06 RX ORDER — SEMAGLUTIDE 1.34 MG/ML
1 INJECTION, SOLUTION SUBCUTANEOUS
Qty: 3 ML | Refills: 3 | Status: SHIPPED | OUTPATIENT
Start: 2023-06-06

## 2024-03-03 DIAGNOSIS — K21.00 GASTRO-ESOPHAGEAL REFLUX DISEASE WITH ESOPHAGITIS, WITHOUT BLEEDING: ICD-10-CM

## 2024-03-04 RX ORDER — PANTOPRAZOLE SODIUM 40 MG/1
40 TABLET, DELAYED RELEASE ORAL DAILY
Qty: 90 TABLET | Refills: 3 | OUTPATIENT
Start: 2024-03-04

## (undated) DEVICE — Device

## (undated) DEVICE — GAUZE SPONGES,16 PLY: Brand: CURITY

## (undated) DEVICE — CANNULA THREADED FLEX 8.0 X 72MM: Brand: CLEAR-TRAC

## (undated) DEVICE — DISPOSABLE MULTI BAG ADAPTERS Y                                    TUBING, STERILE, 2 TO A SET 6 SETS                                    PER BOX

## (undated) DEVICE — CANNULA THREADED FLEX 6.5 X 72MM: Brand: CLEAR-TRAC

## (undated) DEVICE — SUTURE ETHLN SZ 2-0 L18IN NONABSORBABLE BLK L19MM PS-2 PRIM 593H

## (undated) DEVICE — REAMER SURG DIA8.5MM PEEK FORKED TIP PILOTED HD W/ BLT IN

## (undated) DEVICE — SOLUTION IRRIG 3000ML 0.9% SOD CHL FLX CONT 0797208] ICU MEDICAL INC]

## (undated) DEVICE — STOCKINETTE,IMPERVIOUS,12X48,STERILE: Brand: MEDLINE

## (undated) DEVICE — FLUID CONTROL SHOULDER DRAPE PACK: Brand: CONVERTORS

## (undated) DEVICE — (D)IMMOB SHLDR II MED SLINGSHO -- DISC BY MFR NO SUB

## (undated) DEVICE — PREP SKN CHLRAPRP APPL 10.5ML --

## (undated) DEVICE — BLADE RMFG SHVR 4.0MM TOMCAT --

## (undated) DEVICE — (D)GLOVE SURG ORTH 7.5 PWD LTX -- DISC BY MFR USE ITEM 278014

## (undated) DEVICE — BUR RMFG SHV HLLW 6 FLUT 5.5MM --

## (undated) DEVICE — CANNULA ARTHSCP L4CM DIA8MM PASSPRT BTTN

## (undated) DEVICE — FLEX ADVANTAGE 3000CC: Brand: FLEX ADVANTAGE

## (undated) DEVICE — NDL SPNE QNCKE 18GX3.5IN LF --

## (undated) DEVICE — OCCLUSIVE GAUZE STRIP,3% BISMUTH TRIBROMOPHENATE IN PETROLATUM BLEND: Brand: XEROFORM

## (undated) DEVICE — 3M™ MICROFOAM™ SURGICAL TAPE 4 ROLLS/CARTON 6 CARTONS/CASE 1528-3: Brand: 3M™ MICROFOAM™

## (undated) DEVICE — DRAPE,REIN 53X77,STERILE: Brand: MEDLINE

## (undated) DEVICE — [ARTHROSCOPY PUMP,  DO NOT USE IF PACKAGE IS DAMAGED,  KEEP DRY,  KEEP AWAY FROM SUNLIGHT,  PROTECT FROM HEAT AND RADIOACTIVE SOURCES.]: Brand: FLOSTEADY

## (undated) DEVICE — SOFT SILICONE HYDROCELLULAR SACRUM DRESSING WITH LOCK AWAY LAYER: Brand: ALLEVYN LIFE SACRUM (LARGE) PACK OF 10

## (undated) DEVICE — NEEDLE NRV BLK 21GA L4IN 30DEG INSUL BVL EXTN SET STIMUPLEX

## (undated) DEVICE — CANNULA ARTHSCP L5CM ID8MM DBL DAM 1 PC MOLD LO PROF FLNG

## (undated) DEVICE — NEEDLE SPNL 22GA L3.5IN BLK HUB S STL REG WALL FIT STYL W/

## (undated) DEVICE — ABDOMINAL PAD: Brand: DERMACEA

## (undated) DEVICE — LIGHT HANDLE: Brand: DEVON

## (undated) DEVICE — 3M™ BAIR PAWS FLEX™ WARMING GOWN, STANDARD, 20 PER CASE 81003: Brand: BAIR PAWS™

## (undated) DEVICE — KENDALL SCD EXPRESS SLEEVES, KNEE LENGTH, MEDIUM: Brand: KENDALL SCD

## (undated) DEVICE — (D)GLOVE SURG ORTH 8 PWD LTX -- DISC BY MFR USE ITEM 278015

## (undated) DEVICE — 1010 S-DRAPE TOWEL DRAPE 10/BX: Brand: STERI-DRAPE™

## (undated) DEVICE — NEEDLE SUT PASS FOR ROT CUF LABRAL REP MULTFI SCORPION

## (undated) DEVICE — BUR SHV CUT HLLW 6 FLUT 5.5MM --

## (undated) DEVICE — BLADE RMFG SHV RESECT 4MM --

## (undated) DEVICE — 90-S MAX, SUCTION PROBE, NON-BENDABLE, MAX CUT LEVEL 11: Brand: SERFAS ENERGY